# Patient Record
Sex: MALE | Race: OTHER | NOT HISPANIC OR LATINO
[De-identification: names, ages, dates, MRNs, and addresses within clinical notes are randomized per-mention and may not be internally consistent; named-entity substitution may affect disease eponyms.]

---

## 2018-01-29 PROBLEM — Z00.00 ENCOUNTER FOR PREVENTIVE HEALTH EXAMINATION: Status: ACTIVE | Noted: 2018-01-29

## 2018-02-13 ENCOUNTER — APPOINTMENT (OUTPATIENT)
Dept: UROLOGY | Facility: CLINIC | Age: 38
End: 2018-02-13
Payer: MEDICAID

## 2018-02-13 VITALS
TEMPERATURE: 98.2 F | HEIGHT: 75 IN | SYSTOLIC BLOOD PRESSURE: 116 MMHG | WEIGHT: 254 LBS | RESPIRATION RATE: 16 BRPM | BODY MASS INDEX: 31.58 KG/M2 | DIASTOLIC BLOOD PRESSURE: 86 MMHG | HEART RATE: 62 BPM

## 2018-02-13 DIAGNOSIS — Z78.9 OTHER SPECIFIED HEALTH STATUS: ICD-10-CM

## 2018-02-13 DIAGNOSIS — N50.89 OTHER SPECIFIED DISORDERS OF THE MALE GENITAL ORGANS: ICD-10-CM

## 2018-02-13 PROCEDURE — 99204 OFFICE O/P NEW MOD 45 MIN: CPT

## 2018-02-13 RX ORDER — GLYBURIDE 2.5 MG/1
TABLET ORAL
Refills: 0 | Status: ACTIVE | COMMUNITY

## 2018-02-14 LAB
APPEARANCE: CLEAR
BACTERIA: NEGATIVE
BILIRUBIN URINE: NEGATIVE
BLOOD URINE: NEGATIVE
COLOR: YELLOW
GLUCOSE QUALITATIVE U: 1000 MG/DL
HYALINE CASTS: 2 /LPF
KETONES URINE: NEGATIVE
LEUKOCYTE ESTERASE URINE: NEGATIVE
MICROSCOPIC-UA: NORMAL
NITRITE URINE: NEGATIVE
PH URINE: 6
PROTEIN URINE: NEGATIVE MG/DL
RED BLOOD CELLS URINE: 1 /HPF
SPECIFIC GRAVITY URINE: 1.04
SQUAMOUS EPITHELIAL CELLS: 2 /HPF
UROBILINOGEN URINE: NEGATIVE MG/DL
WHITE BLOOD CELLS URINE: 1 /HPF

## 2018-02-15 LAB — BACTERIA UR CULT: NORMAL

## 2018-03-05 ENCOUNTER — APPOINTMENT (OUTPATIENT)
Dept: ULTRASOUND IMAGING | Facility: HOSPITAL | Age: 38
End: 2018-03-05
Payer: MEDICAID

## 2018-03-05 ENCOUNTER — OUTPATIENT (OUTPATIENT)
Dept: OUTPATIENT SERVICES | Facility: HOSPITAL | Age: 38
LOS: 1 days | End: 2018-03-05
Payer: MEDICAID

## 2018-03-05 DIAGNOSIS — N47.1 PHIMOSIS: ICD-10-CM

## 2018-03-05 PROCEDURE — 76870 US EXAM SCROTUM: CPT | Mod: 26

## 2018-03-05 PROCEDURE — 76870 US EXAM SCROTUM: CPT

## 2018-03-14 ENCOUNTER — APPOINTMENT (OUTPATIENT)
Dept: UROLOGY | Facility: CLINIC | Age: 38
End: 2018-03-14
Payer: MEDICAID

## 2018-03-14 VITALS
DIASTOLIC BLOOD PRESSURE: 96 MMHG | TEMPERATURE: 98 F | BODY MASS INDEX: 32.58 KG/M2 | WEIGHT: 262 LBS | HEART RATE: 94 BPM | SYSTOLIC BLOOD PRESSURE: 142 MMHG | HEIGHT: 75 IN

## 2018-03-14 PROCEDURE — 99214 OFFICE O/P EST MOD 30 MIN: CPT

## 2018-03-15 LAB
AFP-TM SERPL-MCNC: 0.6 NG/ML
HCG SERPL-MCNC: <1 MIU/ML
LDH SERPL-CCNC: 148 U/L

## 2018-03-28 ENCOUNTER — APPOINTMENT (OUTPATIENT)
Dept: UROLOGY | Facility: CLINIC | Age: 38
End: 2018-03-28

## 2018-04-11 ENCOUNTER — OUTPATIENT (OUTPATIENT)
Dept: OUTPATIENT SERVICES | Facility: HOSPITAL | Age: 38
LOS: 1 days | End: 2018-04-11
Payer: MEDICAID

## 2018-04-11 ENCOUNTER — APPOINTMENT (OUTPATIENT)
Dept: MRI IMAGING | Facility: HOSPITAL | Age: 38
End: 2018-04-11
Payer: MEDICAID

## 2018-04-11 DIAGNOSIS — D49.59 NEOPLASM OF UNSPECIFIED BEHAVIOR OF OTHER GENITOURINARY ORGAN: ICD-10-CM

## 2018-04-11 PROCEDURE — 72197 MRI PELVIS W/O & W/DYE: CPT | Mod: 26

## 2018-04-11 PROCEDURE — 72197 MRI PELVIS W/O & W/DYE: CPT

## 2018-04-20 ENCOUNTER — APPOINTMENT (OUTPATIENT)
Dept: UROLOGY | Facility: CLINIC | Age: 38
End: 2018-04-20
Payer: MEDICAID

## 2018-04-20 VITALS
BODY MASS INDEX: 32.7 KG/M2 | HEART RATE: 114 BPM | HEIGHT: 75 IN | DIASTOLIC BLOOD PRESSURE: 84 MMHG | SYSTOLIC BLOOD PRESSURE: 132 MMHG | TEMPERATURE: 98.2 F | WEIGHT: 263 LBS

## 2018-04-20 DIAGNOSIS — N47.1 PHIMOSIS: ICD-10-CM

## 2018-04-20 DIAGNOSIS — R30.0 DYSURIA: ICD-10-CM

## 2018-04-20 DIAGNOSIS — Z86.39 PERSONAL HISTORY OF OTHER ENDOCRINE, NUTRITIONAL AND METABOLIC DISEASE: ICD-10-CM

## 2018-04-20 DIAGNOSIS — D49.59 NEOPLASM UNSPECIFIED BEHAVIOR OF OTHER GENITOURINARY ORGAN: ICD-10-CM

## 2018-04-20 PROCEDURE — 99214 OFFICE O/P EST MOD 30 MIN: CPT

## 2018-05-03 ENCOUNTER — OUTPATIENT (OUTPATIENT)
Dept: OUTPATIENT SERVICES | Facility: HOSPITAL | Age: 38
LOS: 1 days | End: 2018-05-03
Payer: MEDICAID

## 2018-05-03 VITALS
WEIGHT: 270.07 LBS | HEART RATE: 89 BPM | DIASTOLIC BLOOD PRESSURE: 85 MMHG | OXYGEN SATURATION: 98 % | HEIGHT: 75 IN | SYSTOLIC BLOOD PRESSURE: 135 MMHG | TEMPERATURE: 98 F | RESPIRATION RATE: 16 BRPM

## 2018-05-03 DIAGNOSIS — Z01.818 ENCOUNTER FOR OTHER PREPROCEDURAL EXAMINATION: ICD-10-CM

## 2018-05-03 DIAGNOSIS — N43.3 HYDROCELE, UNSPECIFIED: ICD-10-CM

## 2018-05-03 DIAGNOSIS — N43.2 OTHER HYDROCELE: ICD-10-CM

## 2018-05-03 PROCEDURE — G0463: CPT

## 2018-05-03 RX ORDER — SODIUM CHLORIDE 9 MG/ML
3 INJECTION INTRAMUSCULAR; INTRAVENOUS; SUBCUTANEOUS EVERY 8 HOURS
Qty: 0 | Refills: 0 | Status: DISCONTINUED | OUTPATIENT
Start: 2018-05-17 | End: 2018-05-25

## 2018-05-03 NOTE — H&P PST ADULT - GASTROINTESTINAL DETAILS
bowel sounds normal/normal/no distention/no masses palpable/no guarding/soft/nontender/no bruit/no rebound tenderness

## 2018-05-03 NOTE — H&P PST ADULT - NEGATIVE GASTROINTESTINAL SYMPTOMS
no constipation/no diarrhea/no nausea/no vomiting/no change in bowel habits/no flatulence/no abdominal pain

## 2018-05-03 NOTE — H&P PST ADULT - PMH
Diabetes mellitus, type 2    GERD (gastroesophageal reflux disease)    HLD (hyperlipidemia)    Hydrocele, bilateral    Hypertension    Obesity (BMI 30.0-34.9)    PVD (peripheral vascular disease)  bilateral lower extremities with varicose veins and brownish discoloration  Seasonal allergies Diabetes mellitus, type 2    GERD (gastroesophageal reflux disease)    HLD (hyperlipidemia)    Hydrocele, bilateral    Hypertension    Obesity (BMI 30.0-34.9)    PVD (peripheral vascular disease)  bilateral lower extremities with varicose veins and brownish discoloration  Seasonal allergies    Vitamin D deficiency

## 2018-05-03 NOTE — H&P PST ADULT - RS GEN PE MLT RESP DETAILS PC
clear to auscultation bilaterally/good air movement/no rales/respirations non-labored/no wheezes/airway patent/breath sounds equal

## 2018-05-03 NOTE — H&P PST ADULT - NEGATIVE CARDIOVASCULAR SYMPTOMS
no paroxysmal nocturnal dyspnea/no chest pain/no dyspnea on exertion/no peripheral edema/no orthopnea/no palpitations

## 2018-05-03 NOTE — H&P PST ADULT - HISTORY OF PRESENT ILLNESS
37 years old male presented with swollen painful testicles x 5 years, pt was diagnosed with hydrocele and is scheduled for bilateral hydrocelectomy on 5/17/18.

## 2018-05-03 NOTE — H&P PST ADULT - PROBLEM SELECTOR PLAN 1
Patient is scheduled for bilateral hydrocelectomy on 5/17/18.Patient is at moderate risk for this surgery.

## 2018-05-03 NOTE — H&P PST ADULT - NSANTHOSAYNRD_GEN_A_CORE
No. EKATERINA screening performed.  STOP BANG Legend: 0-2 = LOW Risk; 3-4 = INTERMEDIATE Risk; 5-8 = HIGH Risk

## 2018-05-03 NOTE — H&P PST ADULT - NEGATIVE GENERAL GENITOURINARY SYMPTOMS
no gas in urine/no hematuria/no dysuria/normal urinary frequency/no urinary hesitancy/no nocturia/no renal colic/no urine discoloration/no incontinence

## 2018-05-16 ENCOUNTER — TRANSCRIPTION ENCOUNTER (OUTPATIENT)
Age: 38
End: 2018-05-16

## 2018-05-17 ENCOUNTER — APPOINTMENT (OUTPATIENT)
Dept: UROLOGY | Facility: HOSPITAL | Age: 38
End: 2018-05-17

## 2018-05-17 ENCOUNTER — OUTPATIENT (OUTPATIENT)
Dept: OUTPATIENT SERVICES | Facility: HOSPITAL | Age: 38
LOS: 1 days | End: 2018-05-17
Payer: MEDICAID

## 2018-05-17 ENCOUNTER — RESULT REVIEW (OUTPATIENT)
Age: 38
End: 2018-05-17

## 2018-05-17 VITALS
OXYGEN SATURATION: 100 % | SYSTOLIC BLOOD PRESSURE: 151 MMHG | TEMPERATURE: 98 F | RESPIRATION RATE: 14 BRPM | DIASTOLIC BLOOD PRESSURE: 94 MMHG | WEIGHT: 270.07 LBS | HEIGHT: 75 IN | HEART RATE: 87 BPM

## 2018-05-17 VITALS
HEART RATE: 84 BPM | DIASTOLIC BLOOD PRESSURE: 83 MMHG | TEMPERATURE: 98 F | RESPIRATION RATE: 16 BRPM | SYSTOLIC BLOOD PRESSURE: 123 MMHG | OXYGEN SATURATION: 98 %

## 2018-05-17 DIAGNOSIS — N43.2 OTHER HYDROCELE: ICD-10-CM

## 2018-05-17 PROCEDURE — 88307 TISSUE EXAM BY PATHOLOGIST: CPT | Mod: 26

## 2018-05-17 PROCEDURE — 88307 TISSUE EXAM BY PATHOLOGIST: CPT

## 2018-05-17 PROCEDURE — 88302 TISSUE EXAM BY PATHOLOGIST: CPT

## 2018-05-17 PROCEDURE — 55500 REMOVAL OF HYDROCELE: CPT | Mod: RT

## 2018-05-17 PROCEDURE — 88302 TISSUE EXAM BY PATHOLOGIST: CPT | Mod: 26

## 2018-05-17 PROCEDURE — 55041 REMOVAL OF HYDROCELES: CPT

## 2018-05-17 PROCEDURE — 82962 GLUCOSE BLOOD TEST: CPT

## 2018-05-17 RX ORDER — OXYCODONE AND ACETAMINOPHEN 5; 325 MG/1; MG/1
1 TABLET ORAL EVERY 6 HOURS
Qty: 0 | Refills: 0 | Status: DISCONTINUED | OUTPATIENT
Start: 2018-05-17 | End: 2018-05-17

## 2018-05-17 RX ORDER — OMEGA-3 ACID ETHYL ESTERS 1 G
1 CAPSULE ORAL
Qty: 0 | Refills: 0 | COMMUNITY

## 2018-05-17 RX ORDER — ONDANSETRON 8 MG/1
4 TABLET, FILM COATED ORAL ONCE
Qty: 0 | Refills: 0 | Status: DISCONTINUED | OUTPATIENT
Start: 2018-05-17 | End: 2018-05-17

## 2018-05-17 RX ORDER — HYDROMORPHONE HYDROCHLORIDE 2 MG/ML
0.5 INJECTION INTRAMUSCULAR; INTRAVENOUS; SUBCUTANEOUS
Qty: 0 | Refills: 0 | Status: DISCONTINUED | OUTPATIENT
Start: 2018-05-17 | End: 2018-05-17

## 2018-05-17 RX ORDER — CEPHALEXIN 500 MG
1 CAPSULE ORAL
Qty: 6 | Refills: 0
Start: 2018-05-17 | End: 2018-05-19

## 2018-05-17 RX ORDER — SODIUM CHLORIDE 9 MG/ML
1000 INJECTION, SOLUTION INTRAVENOUS
Qty: 0 | Refills: 0 | Status: DISCONTINUED | OUTPATIENT
Start: 2018-05-17 | End: 2018-05-25

## 2018-05-17 RX ADMIN — OXYCODONE AND ACETAMINOPHEN 1 TABLET(S): 5; 325 TABLET ORAL at 12:07

## 2018-05-17 RX ADMIN — SODIUM CHLORIDE 100 MILLILITER(S): 9 INJECTION, SOLUTION INTRAVENOUS at 12:08

## 2018-05-17 RX ADMIN — OXYCODONE AND ACETAMINOPHEN 1 TABLET(S): 5; 325 TABLET ORAL at 11:56

## 2018-05-17 NOTE — ASU DISCHARGE PLAN (ADULT/PEDIATRIC). - MEDICATION SUMMARY - MEDICATIONS TO TAKE
I will START or STAY ON the medications listed below when I get home from the hospital:    oxyCODONE-acetaminophen 5 mg-325 mg oral tablet  -- 1 tab(s) by mouth every 6 hours, As needed, Moderate Pain (4 - 6) MDD:4  -- Indication: For pain     aspirin 81 mg oral tablet  -- 1 tab(s) by mouth once a day  -- Indication: For as directed     enalapril 10 mg oral tablet  -- 1 tab(s) by mouth once a day  -- Indication: For as directed     Janumet 50 mg-1000 mg oral tablet  -- 1 tab(s) by mouth 2 times a day  -- Indication: For as directed     glipiZIDE 10 mg oral tablet, extended release  -- 1 tab(s) by mouth once a day  -- Indication: For as directed     loratadine 10 mg oral tablet  -- 1 tab(s) by mouth once a day  -- Indication: For as directed     atorvastatin 20 mg oral tablet  -- 1 tab(s) by mouth once a day  -- Indication: For as directed     Keflex 500 mg oral capsule  -- 1 cap(s) by mouth every 12 hours   -- Finish all this medication unless otherwise directed by prescriber.    -- Indication: For vas directed     NexIUM 40 mg oral delayed release capsule  -- 1 cap(s) by mouth once a day  -- Indication: For as directed     Vitamin D2 50,000 intl units (1.25 mg) oral capsule  -- 1 cap(s) by mouth once a week  -- Indication: For as directed

## 2018-05-17 NOTE — ASU PATIENT PROFILE, ADULT - PMH
Diabetes mellitus, type 2    GERD (gastroesophageal reflux disease)    HLD (hyperlipidemia)    Hydrocele, bilateral    Hypertension    Obesity (BMI 30.0-34.9)    PVD (peripheral vascular disease)  bilateral lower extremities with varicose veins and brownish discoloration  Seasonal allergies    Vitamin D deficiency

## 2018-05-17 NOTE — BRIEF OPERATIVE NOTE - PROCEDURE
<<-----Click on this checkbox to enter Procedure Hydrocelectomy, bilateral  05/17/2018    Active  [facility]

## 2018-05-18 ENCOUNTER — APPOINTMENT (OUTPATIENT)
Dept: UROLOGY | Facility: CLINIC | Age: 38
End: 2018-05-18
Payer: MEDICAID

## 2018-05-18 VITALS
HEIGHT: 75 IN | OXYGEN SATURATION: 97 % | WEIGHT: 260 LBS | DIASTOLIC BLOOD PRESSURE: 80 MMHG | BODY MASS INDEX: 32.33 KG/M2 | RESPIRATION RATE: 16 BRPM | TEMPERATURE: 97.8 F | SYSTOLIC BLOOD PRESSURE: 130 MMHG

## 2018-05-18 DIAGNOSIS — N43.3 HYDROCELE, UNSPECIFIED: ICD-10-CM

## 2018-05-18 PROCEDURE — 99024 POSTOP FOLLOW-UP VISIT: CPT

## 2018-05-19 ENCOUNTER — OTHER (OUTPATIENT)
Age: 38
End: 2018-05-19

## 2018-05-23 ENCOUNTER — MESSAGE (OUTPATIENT)
Age: 38
End: 2018-05-23

## 2018-05-25 ENCOUNTER — APPOINTMENT (OUTPATIENT)
Dept: UROLOGY | Facility: CLINIC | Age: 38
End: 2018-05-25

## 2018-08-17 ENCOUNTER — APPOINTMENT (OUTPATIENT)
Dept: UROLOGY | Facility: CLINIC | Age: 38
End: 2018-08-17

## 2019-04-15 ENCOUNTER — RX RENEWAL (OUTPATIENT)
Age: 39
End: 2019-04-15

## 2019-06-09 PROBLEM — N47.1 PHIMOSIS: Status: ACTIVE | Noted: 2018-02-13

## 2020-07-21 NOTE — BRIEF OPERATIVE NOTE - PRE-OP DX
Please call dad at [phone] and let him know that [facility] and [facility] fever test were both negative  Brother's tests for the same were also negative  See other note  Thank you  Hydrocele, bilateral  05/17/2018    Active  Mer[facility] De Leon

## 2022-09-21 ENCOUNTER — INPATIENT (INPATIENT)
Facility: HOSPITAL | Age: 42
LOS: 12 days | Discharge: HOME HEALTH | DRG: 629 | End: 2022-10-04
Attending: HOSPITALIST | Admitting: HOSPITALIST
Payer: MEDICAID

## 2022-09-21 VITALS
HEIGHT: 75 IN | OXYGEN SATURATION: 100 % | HEART RATE: 110 BPM | RESPIRATION RATE: 18 BRPM | SYSTOLIC BLOOD PRESSURE: 155 MMHG | TEMPERATURE: 98 F | WEIGHT: 253.97 LBS | DIASTOLIC BLOOD PRESSURE: 94 MMHG

## 2022-09-21 DIAGNOSIS — E11.621 TYPE 2 DIABETES MELLITUS WITH FOOT ULCER: ICD-10-CM

## 2022-09-21 DIAGNOSIS — I10 ESSENTIAL (PRIMARY) HYPERTENSION: ICD-10-CM

## 2022-09-21 DIAGNOSIS — S91.302A UNSPECIFIED OPEN WOUND, LEFT FOOT, INITIAL ENCOUNTER: ICD-10-CM

## 2022-09-21 DIAGNOSIS — Z29.9 ENCOUNTER FOR PROPHYLACTIC MEASURES, UNSPECIFIED: ICD-10-CM

## 2022-09-21 DIAGNOSIS — E78.5 HYPERLIPIDEMIA, UNSPECIFIED: ICD-10-CM

## 2022-09-21 DIAGNOSIS — E11.9 TYPE 2 DIABETES MELLITUS WITHOUT COMPLICATIONS: ICD-10-CM

## 2022-09-21 PROBLEM — K21.9 GASTRO-ESOPHAGEAL REFLUX DISEASE WITHOUT ESOPHAGITIS: Chronic | Status: ACTIVE | Noted: 2018-05-03

## 2022-09-21 PROBLEM — E66.9 OBESITY, UNSPECIFIED: Chronic | Status: ACTIVE | Noted: 2018-05-03

## 2022-09-21 LAB
ALBUMIN SERPL ELPH-MCNC: 2.2 G/DL — LOW (ref 3.5–5)
ALP SERPL-CCNC: 72 U/L — SIGNIFICANT CHANGE UP (ref 40–120)
ALT FLD-CCNC: 13 U/L DA — SIGNIFICANT CHANGE UP (ref 10–60)
ANION GAP SERPL CALC-SCNC: 9 MMOL/L — SIGNIFICANT CHANGE UP (ref 5–17)
AST SERPL-CCNC: 9 U/L — LOW (ref 10–40)
BASOPHILS # BLD AUTO: 0.05 K/UL — SIGNIFICANT CHANGE UP (ref 0–0.2)
BASOPHILS NFR BLD AUTO: 0.4 % — SIGNIFICANT CHANGE UP (ref 0–2)
BILIRUB SERPL-MCNC: 0.7 MG/DL — SIGNIFICANT CHANGE UP (ref 0.2–1.2)
BUN SERPL-MCNC: 7 MG/DL — SIGNIFICANT CHANGE UP (ref 7–18)
CALCIUM SERPL-MCNC: 8.8 MG/DL — SIGNIFICANT CHANGE UP (ref 8.4–10.5)
CHLORIDE SERPL-SCNC: 98 MMOL/L — SIGNIFICANT CHANGE UP (ref 96–108)
CO2 SERPL-SCNC: 24 MMOL/L — SIGNIFICANT CHANGE UP (ref 22–31)
CREAT SERPL-MCNC: 0.72 MG/DL — SIGNIFICANT CHANGE UP (ref 0.5–1.3)
EGFR: 117 ML/MIN/1.73M2 — SIGNIFICANT CHANGE UP
EOSINOPHIL # BLD AUTO: 0.14 K/UL — SIGNIFICANT CHANGE UP (ref 0–0.5)
EOSINOPHIL NFR BLD AUTO: 1.2 % — SIGNIFICANT CHANGE UP (ref 0–6)
ERYTHROCYTE [SEDIMENTATION RATE] IN BLOOD: 96 MM/HR — HIGH (ref 0–15)
GLUCOSE SERPL-MCNC: 246 MG/DL — HIGH (ref 70–99)
HCT VFR BLD CALC: 34.1 % — LOW (ref 39–50)
HGB BLD-MCNC: 10.7 G/DL — LOW (ref 13–17)
IMM GRANULOCYTES NFR BLD AUTO: 0.3 % — SIGNIFICANT CHANGE UP (ref 0–0.9)
LYMPHOCYTES # BLD AUTO: 1.75 K/UL — SIGNIFICANT CHANGE UP (ref 1–3.3)
LYMPHOCYTES # BLD AUTO: 15.3 % — SIGNIFICANT CHANGE UP (ref 13–44)
MCHC RBC-ENTMCNC: 25.8 PG — LOW (ref 27–34)
MCHC RBC-ENTMCNC: 31.4 GM/DL — LOW (ref 32–36)
MCV RBC AUTO: 82.4 FL — SIGNIFICANT CHANGE UP (ref 80–100)
MONOCYTES # BLD AUTO: 1.2 K/UL — HIGH (ref 0–0.9)
MONOCYTES NFR BLD AUTO: 10.5 % — SIGNIFICANT CHANGE UP (ref 2–14)
NEUTROPHILS # BLD AUTO: 8.28 K/UL — HIGH (ref 1.8–7.4)
NEUTROPHILS NFR BLD AUTO: 72.3 % — SIGNIFICANT CHANGE UP (ref 43–77)
NRBC # BLD: 0 /100 WBCS — SIGNIFICANT CHANGE UP (ref 0–0)
PLATELET # BLD AUTO: 266 K/UL — SIGNIFICANT CHANGE UP (ref 150–400)
POTASSIUM SERPL-MCNC: 3.9 MMOL/L — SIGNIFICANT CHANGE UP (ref 3.5–5.3)
POTASSIUM SERPL-SCNC: 3.9 MMOL/L — SIGNIFICANT CHANGE UP (ref 3.5–5.3)
PROT SERPL-MCNC: 8.3 G/DL — SIGNIFICANT CHANGE UP (ref 6–8.3)
RBC # BLD: 4.14 M/UL — LOW (ref 4.2–5.8)
RBC # FLD: 13.8 % — SIGNIFICANT CHANGE UP (ref 10.3–14.5)
SARS-COV-2 RNA SPEC QL NAA+PROBE: SIGNIFICANT CHANGE UP
SODIUM SERPL-SCNC: 131 MMOL/L — LOW (ref 135–145)
WBC # BLD: 11.45 K/UL — HIGH (ref 3.8–10.5)
WBC # FLD AUTO: 11.45 K/UL — HIGH (ref 3.8–10.5)

## 2022-09-21 PROCEDURE — 99223 1ST HOSP IP/OBS HIGH 75: CPT | Mod: GC

## 2022-09-21 PROCEDURE — 99285 EMERGENCY DEPT VISIT HI MDM: CPT

## 2022-09-21 PROCEDURE — 93971 EXTREMITY STUDY: CPT | Mod: 26,LT

## 2022-09-21 PROCEDURE — 93923 UPR/LXTR ART STDY 3+ LVLS: CPT | Mod: 26

## 2022-09-21 PROCEDURE — 73630 X-RAY EXAM OF FOOT: CPT | Mod: 26,LT

## 2022-09-21 RX ORDER — ESOMEPRAZOLE MAGNESIUM 40 MG/1
1 CAPSULE, DELAYED RELEASE ORAL
Qty: 0 | Refills: 0 | DISCHARGE

## 2022-09-21 RX ORDER — VANCOMYCIN HCL 1 G
VIAL (EA) INTRAVENOUS
Refills: 0 | Status: COMPLETED | OUTPATIENT
Start: 2022-09-21 | End: 2022-09-22

## 2022-09-21 RX ORDER — CEFEPIME 1 G/1
INJECTION, POWDER, FOR SOLUTION INTRAMUSCULAR; INTRAVENOUS
Refills: 0 | Status: DISCONTINUED | OUTPATIENT
Start: 2022-09-21 | End: 2022-09-23

## 2022-09-21 RX ORDER — ONDANSETRON 8 MG/1
4 TABLET, FILM COATED ORAL ONCE
Refills: 0 | Status: DISCONTINUED | OUTPATIENT
Start: 2022-09-21 | End: 2022-09-21

## 2022-09-21 RX ORDER — VANCOMYCIN HCL 1 G
1750 VIAL (EA) INTRAVENOUS ONCE
Refills: 0 | Status: COMPLETED | OUTPATIENT
Start: 2022-09-21 | End: 2022-09-21

## 2022-09-21 RX ORDER — VANCOMYCIN HCL 1 G
1750 VIAL (EA) INTRAVENOUS EVERY 12 HOURS
Refills: 0 | Status: DISCONTINUED | OUTPATIENT
Start: 2022-09-22 | End: 2022-09-23

## 2022-09-21 RX ORDER — INFLUENZA VIRUS VACCINE 15; 15; 15; 15 UG/.5ML; UG/.5ML; UG/.5ML; UG/.5ML
0.5 SUSPENSION INTRAMUSCULAR ONCE
Refills: 0 | Status: DISCONTINUED | OUTPATIENT
Start: 2022-09-21 | End: 2022-10-04

## 2022-09-21 RX ORDER — ATORVASTATIN CALCIUM 80 MG/1
20 TABLET, FILM COATED ORAL AT BEDTIME
Refills: 0 | Status: DISCONTINUED | OUTPATIENT
Start: 2022-09-21 | End: 2022-09-22

## 2022-09-21 RX ORDER — CEFEPIME 1 G/1
1000 INJECTION, POWDER, FOR SOLUTION INTRAMUSCULAR; INTRAVENOUS ONCE
Refills: 0 | Status: COMPLETED | OUTPATIENT
Start: 2022-09-21 | End: 2022-09-21

## 2022-09-21 RX ORDER — HEPARIN SODIUM 5000 [USP'U]/ML
5000 INJECTION INTRAVENOUS; SUBCUTANEOUS EVERY 8 HOURS
Refills: 0 | Status: DISCONTINUED | OUTPATIENT
Start: 2022-09-21 | End: 2022-09-26

## 2022-09-21 RX ORDER — MORPHINE SULFATE 50 MG/1
4 CAPSULE, EXTENDED RELEASE ORAL ONCE
Refills: 0 | Status: DISCONTINUED | OUTPATIENT
Start: 2022-09-21 | End: 2022-09-21

## 2022-09-21 RX ORDER — PIPERACILLIN AND TAZOBACTAM 4; .5 G/20ML; G/20ML
3.38 INJECTION, POWDER, LYOPHILIZED, FOR SOLUTION INTRAVENOUS ONCE
Refills: 0 | Status: COMPLETED | OUTPATIENT
Start: 2022-09-21 | End: 2022-09-21

## 2022-09-21 RX ORDER — TETANUS AND DIPHTHERIA TOXOIDS ADSORBED 2; 2 [LF]/.5ML; [LF]/.5ML
0.5 INJECTION INTRAMUSCULAR ONCE
Refills: 0 | Status: COMPLETED | OUTPATIENT
Start: 2022-09-21 | End: 2022-09-21

## 2022-09-21 RX ORDER — GLUCAGON INJECTION, SOLUTION 0.5 MG/.1ML
1 INJECTION, SOLUTION SUBCUTANEOUS ONCE
Refills: 0 | Status: DISCONTINUED | OUTPATIENT
Start: 2022-09-21 | End: 2022-09-26

## 2022-09-21 RX ORDER — CEFEPIME 1 G/1
1000 INJECTION, POWDER, FOR SOLUTION INTRAMUSCULAR; INTRAVENOUS EVERY 12 HOURS
Refills: 0 | Status: DISCONTINUED | OUTPATIENT
Start: 2022-09-22 | End: 2022-09-23

## 2022-09-21 RX ORDER — INSULIN LISPRO 100/ML
VIAL (ML) SUBCUTANEOUS
Refills: 0 | Status: DISCONTINUED | OUTPATIENT
Start: 2022-09-21 | End: 2022-09-23

## 2022-09-21 RX ORDER — ACETAMINOPHEN 500 MG
650 TABLET ORAL EVERY 6 HOURS
Refills: 0 | Status: DISCONTINUED | OUTPATIENT
Start: 2022-09-21 | End: 2022-09-26

## 2022-09-21 RX ORDER — ASPIRIN/CALCIUM CARB/MAGNESIUM 324 MG
81 TABLET ORAL DAILY
Refills: 0 | Status: DISCONTINUED | OUTPATIENT
Start: 2022-09-21 | End: 2022-09-26

## 2022-09-21 RX ORDER — VANCOMYCIN HCL 1 G
1750 VIAL (EA) INTRAVENOUS EVERY 12 HOURS
Refills: 0 | Status: COMPLETED | OUTPATIENT
Start: 2022-09-22 | End: 2022-09-22

## 2022-09-21 RX ORDER — ERGOCALCIFEROL 1.25 MG/1
1 CAPSULE ORAL
Qty: 0 | Refills: 0 | DISCHARGE

## 2022-09-21 RX ORDER — SODIUM CHLORIDE 9 MG/ML
1000 INJECTION, SOLUTION INTRAVENOUS
Refills: 0 | Status: DISCONTINUED | OUTPATIENT
Start: 2022-09-21 | End: 2022-09-21

## 2022-09-21 RX ORDER — LORATADINE 10 MG/1
1 TABLET ORAL
Qty: 0 | Refills: 0 | DISCHARGE

## 2022-09-21 RX ADMIN — Medication 250 MILLIGRAM(S): at 16:00

## 2022-09-21 RX ADMIN — ATORVASTATIN CALCIUM 20 MILLIGRAM(S): 80 TABLET, FILM COATED ORAL at 19:57

## 2022-09-21 RX ADMIN — MORPHINE SULFATE 4 MILLIGRAM(S): 50 CAPSULE, EXTENDED RELEASE ORAL at 15:20

## 2022-09-21 RX ADMIN — MORPHINE SULFATE 4 MILLIGRAM(S): 50 CAPSULE, EXTENDED RELEASE ORAL at 14:41

## 2022-09-21 RX ADMIN — HEPARIN SODIUM 5000 UNIT(S): 5000 INJECTION INTRAVENOUS; SUBCUTANEOUS at 19:58

## 2022-09-21 RX ADMIN — Medication 650 MILLIGRAM(S): at 19:58

## 2022-09-21 RX ADMIN — Medication 81 MILLIGRAM(S): at 19:59

## 2022-09-21 RX ADMIN — PIPERACILLIN AND TAZOBACTAM 200 GRAM(S): 4; .5 INJECTION, POWDER, LYOPHILIZED, FOR SOLUTION INTRAVENOUS at 14:40

## 2022-09-21 RX ADMIN — TETANUS AND DIPHTHERIA TOXOIDS ADSORBED 0.5 MILLILITER(S): 2; 2 INJECTION INTRAMUSCULAR at 20:04

## 2022-09-21 RX ADMIN — CEFEPIME 100 MILLIGRAM(S): 1 INJECTION, POWDER, FOR SOLUTION INTRAMUSCULAR; INTRAVENOUS at 19:58

## 2022-09-21 NOTE — ED PROVIDER NOTE - OBJECTIVE STATEMENT
41 year old male with PMHx of DM presents for worsening left foot ulcer, left foot swelling and pain. He had seen ID doctor for further evaluation of osteomyelitis and ulcer. Was on IV abx 2-3 weeks ago but finished course however pain still there and was told there was worsening of infection.

## 2022-09-21 NOTE — ED ADULT NURSE NOTE - OBJECTIVE STATEMENT
Pt c/o worsening diabetic foot ulcer. Pt states sent from infection MD for evaluation of foot. Upon assessment, left foot swollen, diabetic ulcer noted. No acute distress noted, denies chest pain, no shortness of breath indicated.

## 2022-09-21 NOTE — ED PROVIDER NOTE - CLINICAL SUMMARY MEDICAL DECISION MAKING FREE TEXT BOX
Patient presents for left foot pain, swelling, redness and evaluation for infection. On exam, left leg is swollen, erythematous with tender stage 2 ulcer under left second toe. Concern for diabetic foot ulcer, osteomyelitis, necrotizing fasciitis, abscess. Plan: labs, XR, US, abx and admission

## 2022-09-21 NOTE — H&P ADULT - ATTENDING COMMENTS
Patient seen and examined. Case discussed with Dr. Chambers. In brief, this is a 42 yo M with DM2 with hyperglycemia (reported A1c >10), HTN, HLD who presents for worsening of his left foot wound. Per the patient, he stepped on a nail a number of weeks ago, subsequently developed an infection around it and went to [facility] where the wound was debrided by podiatry and patient was discharged with 2 weeks of IV Unasyn via PICC line. Patient finished his antibiotics last Thursday, but he then noticed the wound to be having purulent discharge, worsening pain, and worsening erythema. He also endorsed chills, but did not have a thermometer to take his temperature. He subsequently followed up with an infectious disease physician today (he forgets the name) who told him to go to the ED for further evaluation. He subsequently returned to [facility] but waited 10 hours without being seen so he came to [facility]. Appreciate podiatry recommendations, will check MRI of the left foot to assess for osteomyelitis. Will start vancomycin and cefepime as patient reports chills and subjective fevers at home. ID Consult (Dr. Stratton). Team to obtain home insulin dosing and check A1c. Remaining care as noted above. Patient seen and examined. Case discussed with Dr. Chambers. In brief, this is a 40 yo M with DM2 with hyperglycemia (reported A1c >10), HTN, HLD who presents for worsening of his left foot wound. Per the patient, he stepped on a nail a number of weeks ago, subsequently developed an infection around it and went to [facility] where the wound was debrided by podiatry and patient was discharged with 2 weeks of IV Unasyn via PICC line. Patient finished his antibiotics last Thursday, but he then noticed the wound to be having purulent discharge, worsening pain, and worsening erythema. He also endorsed chills, but did not have a thermometer to take his temperature. He subsequently followed up with an infectious disease physician today (he forgets the name) who told him to go to the ED for further evaluation. He subsequently returned to [facility] but waited 10 hours without being seen so he came to [facility]. Appreciate podiatry recommendations, will check MRI of the left foot to assess for osteomyelitis. Will start vancomycin and cefepime as patient reports chills and subjective fevers at home. ID Consult (Dr. Stratton). Team to obtain home insulin dosing and check A1c. Remaining care as noted above. Wife will bring information of the infectious disease doctor the patient saw in the outpatient setting tomorrow. Patient seen and examined. Case discussed with Dr. Chambers. In brief, this is a 40 yo M with DM2 with hyperglycemia (reported A1c >10), HTN, HLD who presents for worsening of his left foot wound. Per the patient, he stepped on a nail a number of weeks ago, subsequently developed an infection around it and went to [facility] where the wound was debrided by podiatry and patient was discharged with 2 weeks of IV Unasyn via PICC line. Patient finished his antibiotics last Thursday, but he then noticed the wound to be having purulent discharge, worsening pain, and worsening erythema. He also endorsed chills, but did not have a thermometer to take his temperature. He subsequently followed up with an infectious disease physician today (he forgets the name) who told him to go to the ED for further evaluation. He subsequently returned to [facility] but waited 10 hours without being seen so he came to [facility]. Appreciate podiatry recommendations, will check MRI of the left foot to assess for osteomyelitis. Will start vancomycin and cefepime as patient reports chills and subjective fevers at home. ID Consult (Dr. Strattno). Team to obtain home insulin dosing and check A1c. Remaining care as stated above. Wife will bring information of the infectious disease doctor the patient saw in the outpatient setting tomorrow.

## 2022-09-21 NOTE — ED PROVIDER NOTE - PHYSICAL EXAMINATION
Const: Uncomfortable  HEENT: Head is normocephalic, atraumatic. PERRLA. EOMI. Sclera and conjunctiva normal  Lungs:clear to auscultation bilaterally. No wheezes, crackles, rhonchi   Cardiovascular: RRR, no murmurs, rubs or gallops.  Abdomen: No scars. No distension. Soft and nontender. No rebound, guarding or masses noted.  Back: No midlines or paraspinous tenderness. No CVA tenderness   MSK: Left leg swollen, erythematous. No pitting. Left foot is warm, +2 dp pulses with foot ulcer at plantar aspect of the left foot by great toe and second toe.   Neuro:Awake, AOx3, follows commands

## 2022-09-21 NOTE — ED PROVIDER NOTE - NSICDXPASTMEDICALHX_GEN_ALL_CORE_FT
PAST MEDICAL HISTORY:  Diabetes mellitus, type 2     GERD (gastroesophageal reflux disease)     HLD (hyperlipidemia)     Hypertension     Obesity (BMI 30.0-34.9)

## 2022-09-21 NOTE — ED ADULT NURSE NOTE - ED STAT RN HANDOFF DETAILS
Report given to ASHLEY HAYDEN Pt resting in bed, no acute distress noted, denies chest pain, no shortness of breath indicated.

## 2022-09-21 NOTE — H&P ADULT - ASSESSMENT
Patient is a 40 yo male with hx of DM (last A1c >10), HTN, HLD presents with a left foot wound. Upon evaluation in the ED, patient afebrile and hemodynamically stable. Further workup, showed leukocytosis of 11k, with negative DVT study. Patient admitted for left foot wound r/o osteomyelitis.

## 2022-09-21 NOTE — CONSULT NOTE ADULT - SUBJECTIVE AND OBJECTIVE BOX
Podiatry HPI: Pt is a 41M who presents to ED with his girlfriend with a chief complaint of worsening left foot wound. Pt states about 1.5-2 months prior, he was walking in work boots when he stepped on a metal screw. Pt admits he didn't feel it initially due to his neuropathy from diabetes and it wasn't until he noticed blood that he saw a wound. Following this, pt arrived at [facility] where he was admitted for 7 days where they did a bedside I&D and given Unasyn and later discharged on IV picc line with more unasyn. Pt states he previously had a home health nurse changing his foot dressings every other day. Admits he noticed a blistering that worsened to bottom of left foot and that he had a lot of drainage to left 1st interspace with pinpoint wound. Pt states today, he experienced chills and burning pain that worsened when walking. Denies further constitutional symptoms. Denies recent acute trauma. Denies further pedal complaints.     Patient is a 41y old  Male who presents with a chief complaint of Left foot wound r/o Osteomyelitis (21 Sep 2022 18:46)      HPI:  Patient is a 42 yo male with hx of DM (last A1c >10), HTN, HLD presents with a left foot wound. The left foot wound initially started 6 weeks ago when the patient had a screw puncture his sole of his slipper and then suffered a wound on the bottom of the left foot. Patient stated he then when to [facility] and was admitted for 7 days in which he received Unasyn and then was later discharged with PICC line for additional 2-3 weeks of IV unasyn. Patient had wound care nurse come to his home, however started to develop blistering of the left foot and enlargement of the wound on the sole of his foot. Wounds also developed between the left great toe and 2nd toe. Patient reports purulent discharge and pain. Pain is in the left foot, 10/10 burning, constant and worsened with ambulation. Patient reports fevers and chills. Patient also states he has numbness in the 2nd toe on the left side and tingling. Patient denies chest pain, sob or palpitations. Denies syncope, dizziness.     Pharmacy was called multiple times, no answer. Please confirm Insulin dosages.  (21 Sep 2022 18:46)      Podiatry HPI    PMH:Seasonal allergies    Obesity (BMI 30.0-34.9)    Hydrocele, bilateral    HLD (hyperlipidemia)    Hypertension    Diabetes mellitus, type 2    PVD (peripheral vascular disease)    GERD (gastroesophageal reflux disease)    Vitamin D deficiency      Allergies: No Known Allergies    Medications: acetaminophen     Tablet .. 650 milliGRAM(s) Oral every 6 hours PRN  aspirin  chewable 81 milliGRAM(s) Oral daily  atorvastatin 20 milliGRAM(s) Oral at bedtime  cefepime   IVPB      enalapril 10 milliGRAM(s) Oral daily  glucagon  Injectable 1 milliGRAM(s) IntraMuscular once  heparin   Injectable 5000 Unit(s) SubCutaneous every 8 hours  insulin lispro (ADMELOG) corrective regimen sliding scale   SubCutaneous three times a day before meals  vancomycin  IVPB        FH:Diabetes mellitus, type 2 (Mother)      PSX: No significant past surgical history      SH: acetaminophen     Tablet .. 650 milliGRAM(s) Oral every 6 hours PRN  aspirin  chewable 81 milliGRAM(s) Oral daily  atorvastatin 20 milliGRAM(s) Oral at bedtime  cefepime   IVPB      enalapril 10 milliGRAM(s) Oral daily  glucagon  Injectable 1 milliGRAM(s) IntraMuscular once  heparin   Injectable 5000 Unit(s) SubCutaneous every 8 hours  insulin lispro (ADMELOG) corrective regimen sliding scale   SubCutaneous three times a day before meals  vancomycin  IVPB          Vital Signs Last 24 Hrs  T(C): 38.3 (21 Sep 2022 20:17), Max: 38.3 (21 Sep 2022 20:17)  T(F): 100.9 (21 Sep 2022 20:17), Max: 100.9 (21 Sep 2022 20:17)  HR: 112 (21 Sep 2022 20:17) (97 - 112)  BP: 126/71 (21 Sep 2022 16:17) (126/71 - 155/94)  BP(mean): --  RR: 20 (21 Sep 2022 20:17) (18 - 20)  SpO2: 98% (21 Sep 2022 20:17) (95% - 100%)    Parameters below as of 21 Sep 2022 10:42  Patient On (Oxygen Delivery Method): room air        LABS                        10.7   11.45 )-----------( 266      ( 21 Sep 2022 14:20 )             34.1               09-21    131<L>  |  98  |  7   ----------------------------<  246<H>  3.9   |  24  |  0.72    Ca    8.8      21 Sep 2022 14:20    TPro  8.3  /  Alb  2.2<L>  /  TBili  0.7  /  DBili  x   /  AST  9<L>  /  ALT  13  /  AlkPhos  72  09-21      ROS  REVIEW OF SYSTEMS:    CONSTITUTIONAL: No fever, weight loss, or fatigue  EYES: No eye pain, visual disturbances, or discharge  ENMT:  No difficulty hearing, tinnitus, vertigo; No sinus or throat pain  NECK: No pain or stiffness  BREASTS: No pain, masses, or nipple discharge  RESPIRATORY: No cough, wheezing, chills or hemoptysis; No shortness of breath  CARDIOVASCULAR: No chest pain, palpitations, dizziness, or leg swelling  GASTROINTESTINAL: No abdominal or epigastric pain. No nausea, vomiting, or hematemesis; No diarrhea or constipation. No melena or hematochezia.  GENITOURINARY: No dysuria, frequency, hematuria, or incontinence  NEUROLOGICAL: No headaches, memory loss, loss of strength, numbness, or tremors  SKIN: No itching, burning, rashes, or lesions   LYMPH NODES: No enlarged glands  ENDOCRINE: No heat or cold intolerance; No hair loss  MUSCULOSKELETAL: No joint pain or swelling; No muscle, back, or extremity pain  PSYCHIATRIC: No depression, anxiety, mood swings, or difficulty sleeping  HEME/LYMPH: No easy bruising, or bleeding gums  ALLERY AND IMMUNOLOGIC: No hives or eczema      PHYSICAL EXAM  LE Focused:  Pt is A&Ox3 in no acute distress  Vasc: DP/PT pulses non palpable due to edema b/l ; CFT < 3 seconds to hallux on left; edema and erythema noted to dorsal foot on left with increased TG from proximal to distal on left   Derm: Left 1st interspace maceration noted with small wound that tunneled to plantar wound measuring .2 x .2cm. < 5 cc of purulent drainage was expressed from 1st interspace wound. Left plantar foot wound sub-2nd and 3rd metatarsal heads extending plantar with hyperkeratotic edges and fibrous wound base that had scant purulent drainage < 1 cc and + PTB.    Neuro: Protective sensation absent b/l; light touch sensation diminished b/l   MSK: No POP to left foot wounds       IMAGING: ?xray    CULTURES:     A:  - DM uncontrolled  - Left plantar foot wound; Left 1st interspace wound         P:   Patient evaluated and Chart reviewed   Discussed diagnosis and treatment with patient  Wound flushed with betadine/saline flush   Obtained deep wound culture from left 1st interspace deep wound to be sent to Lab   Excisional debridement with 10 blade of left plantar foot wound down to and including subcutaneous tissue  Applied iodoform packing to left plantar foot and 1st interspace with DSD, ACE  X-rays evaluated; pending final read; no soft tissue emphysema noted   Continue with IV antibiotics As Per ID recc  Recommend MRI of left foot to r/o OM  Ordered ADRYAN/PVR  NWB to left foot  Discussed importance of daily foot examinations and proper shoe gear and to importance of lower Fasting Blood Glucose levels.   Podiatry to follow while in house.  Discussed with Attending Dr. Serrano    Podiatry HPI: Pt is a 41M who presents to ED with his girlfriend with a chief complaint of worsening left foot wound. Pt states about 1.5-2 months prior, he was walking in work boots when he stepped on a metal screw. Pt admits he didn't feel it initially due to his neuropathy from diabetes and it wasn't until he noticed blood that he saw a wound. Following this, pt arrived at [facility] where he was admitted for 7 days where they did a bedside I&D and given Unasyn and later discharged on IV picc line with more unasyn for 2 weeks. Pt states he previously had a home health nurse changing his foot dressings every other day. Admits he noticed a blistering that worsened to bottom of left foot and that he had a lot of drainage to left 1st interspace with pinpoint wound. A week after the IV abx finished he saw his Infectious disease doctor who advised him to go to the ED.. Pt states today, he experienced chills and burning pain that worsened when walking. Denies further constitutional symptoms. Denies recent acute trauma. Denies further pedal complaints.     Patient is a 41y old  Male who presents with a chief complaint of Left foot wound r/o Osteomyelitis (21 Sep 2022 18:46)      HPI:  Patient is a 40 yo male with hx of DM (last A1c >10), HTN, HLD presents with a left foot wound. The left foot wound initially started 6 weeks ago when the patient had a screw puncture his sole of his slipper and then suffered a wound on the bottom of the left foot. Patient stated he then when to [facility] and was admitted for 7 days in which he received Unasyn and then was later discharged with PICC line for additional 2-3 weeks of IV unasyn. Patient had wound care nurse come to his home, however started to develop blistering of the left foot and enlargement of the wound on the sole of his foot. Wounds also developed between the left great toe and 2nd toe. Patient reports purulent discharge and pain. Pain is in the left foot, 10/10 burning, constant and worsened with ambulation. Patient reports fevers and chills. Patient also states he has numbness in the 2nd toe on the left side and tingling. Patient denies chest pain, sob or palpitations. Denies syncope, dizziness.     Pharmacy was called multiple times, no answer. Please confirm Insulin dosages.  (21 Sep 2022 18:46)      Podiatry HPI    PMH:Seasonal allergies    Obesity (BMI 30.0-34.9)    Hydrocele, bilateral    HLD (hyperlipidemia)    Hypertension    Diabetes mellitus, type 2    PVD (peripheral vascular disease)    GERD (gastroesophageal reflux disease)    Vitamin D deficiency      Allergies: No Known Allergies    Medications: acetaminophen     Tablet .. 650 milliGRAM(s) Oral every 6 hours PRN  aspirin  chewable 81 milliGRAM(s) Oral daily  atorvastatin 20 milliGRAM(s) Oral at bedtime  cefepime   IVPB      enalapril 10 milliGRAM(s) Oral daily  glucagon  Injectable 1 milliGRAM(s) IntraMuscular once  heparin   Injectable 5000 Unit(s) SubCutaneous every 8 hours  insulin lispro (ADMELOG) corrective regimen sliding scale   SubCutaneous three times a day before meals  vancomycin  IVPB        FH:Diabetes mellitus, type 2 (Mother)      PSX: No significant past surgical history      SH: acetaminophen     Tablet .. 650 milliGRAM(s) Oral every 6 hours PRN  aspirin  chewable 81 milliGRAM(s) Oral daily  atorvastatin 20 milliGRAM(s) Oral at bedtime  cefepime   IVPB      enalapril 10 milliGRAM(s) Oral daily  glucagon  Injectable 1 milliGRAM(s) IntraMuscular once  heparin   Injectable 5000 Unit(s) SubCutaneous every 8 hours  insulin lispro (ADMELOG) corrective regimen sliding scale   SubCutaneous three times a day before meals  vancomycin  IVPB          Vital Signs Last 24 Hrs  T(C): 38.3 (21 Sep 2022 20:17), Max: 38.3 (21 Sep 2022 20:17)  T(F): 100.9 (21 Sep 2022 20:17), Max: 100.9 (21 Sep 2022 20:17)  HR: 112 (21 Sep 2022 20:17) (97 - 112)  BP: 126/71 (21 Sep 2022 16:17) (126/71 - 155/94)  BP(mean): --  RR: 20 (21 Sep 2022 20:17) (18 - 20)  SpO2: 98% (21 Sep 2022 20:17) (95% - 100%)    Parameters below as of 21 Sep 2022 10:42  Patient On (Oxygen Delivery Method): room air        LABS                        10.7   11.45 )-----------( 266      ( 21 Sep 2022 14:20 )             34.1               09-21    131<L>  |  98  |  7   ----------------------------<  246<H>  3.9   |  24  |  0.72    Ca    8.8      21 Sep 2022 14:20    TPro  8.3  /  Alb  2.2<L>  /  TBili  0.7  /  DBili  x   /  AST  9<L>  /  ALT  13  /  AlkPhos  72  09-21      ROS  REVIEW OF SYSTEMS:    CONSTITUTIONAL: No fever, weight loss, or fatigue  EYES: No eye pain, visual disturbances, or discharge  ENMT:  No difficulty hearing, tinnitus, vertigo; No sinus or throat pain  NECK: No pain or stiffness  BREASTS: No pain, masses, or nipple discharge  RESPIRATORY: No cough, wheezing, chills or hemoptysis; No shortness of breath  CARDIOVASCULAR: No chest pain, palpitations, dizziness, or leg swelling  GASTROINTESTINAL: No abdominal or epigastric pain. No nausea, vomiting, or hematemesis; No diarrhea or constipation. No melena or hematochezia.  GENITOURINARY: No dysuria, frequency, hematuria, or incontinence  NEUROLOGICAL: No headaches, memory loss, loss of strength, numbness, or tremors  SKIN: No itching, burning, rashes,    LYMPH NODES: No enlarged glands  ENDOCRINE: No heat or cold intolerance; No hair loss  MUSCULOSKELETAL: No joint pain or swelling; No muscle, back, or extremity pain  PSYCHIATRIC: No depression, anxiety, mood swings, or difficulty sleeping  HEME/LYMPH: No easy bruising, or bleeding gums  ALLERY AND IMMUNOLOGIC: No hives or eczema      PHYSICAL EXAM  LE Focused:  Pt is A&Ox3 in no acute distress  Vasc: DP/PT pulses non palpable due to edema b/l ; CFT < 3 seconds to hallux on left; edema and erythema noted to dorsal foot on left with increased TG from proximal to distal on left   Derm: Left 1st interspace maceration noted with small wound that tunneled to plantar wound measuring .2 x .2cm. < 5 cc of purulent drainage was expressed from 1st interspace wound. Left plantar foot wound sub-2nd and 3rd metatarsal heads extending plantar with hyperkeratotic edges and fibrous wound base that had scant purulent drainage < 1 cc and + PTB. No fluctuance/ soft tissue crepitus/ malodor/ ascending cellulitis/ streaking.  Neuro: Protective sensation absent b/l; light touch sensation diminished b/l   MSK: No POP to left foot wounds       IMAGING: ?xray    CULTURES:     A:  - DM uncontrolled  - Left plantar foot wound; Left 1st interspace wound with cellulitis, r/o OM        P:   Patient evaluated and Chart reviewed   Discussed diagnosis and treatment with patient  Wound flushed with betadine/saline flush   Obtained deep wound culture from left 1st interspace deep wound to be sent to Lab   Excisional debridement with 10 blade of left plantar foot wound down to and including subcutaneous tissue  Applied iodoform packing to left plantar foot and 1st interspace with DSD, ACE  X-rays evaluated; pending final read; no soft tissue emphysema noted   Continue with IV antibiotics As Per ID recc  Recommend MRI of left foot to r/o OM  Ordered ADRYAN/PVR  NWB to left foot  Discussed importance of daily foot examinations and proper shoe gear and to importance of lower Fasting Blood Glucose levels.   Podiatry to follow while in house.  Discussed with Attending Dr. Serrano

## 2022-09-21 NOTE — H&P ADULT - PROBLEM SELECTOR PLAN 1
- patient found to have open wound on the sole of the left foot with positive probe to bone, diminished sensation and decreased pulses  - afebrile but leukocytosis of 11k  - admit to medicine to r/o osteomyelitis  - since it was puncture wound will give tetanus  - will start vanc and cefepime  - ID consulted Dr. Stratton  - f/u wound cultures  - f/u MR of right foot to r/o osteomyelitis  - obtain records from [facility] for sensitivities and pathogens  - podiatry reccs appreciated

## 2022-09-21 NOTE — H&P ADULT - NSHPPHYSICALEXAM_GEN_ALL_CORE
PHYSICAL EXAM:  GENERAL: NAD, speaks in full sentences, no signs of respiratory distress  HEAD:  Atraumatic, Normocephalic  EYES: EOMI, PERRLA, conjunctiva and sclera clear  NECK: Supple, No JVD  CHEST/LUNG: Clear to auscultation bilaterally; No wheeze; No crackles; No accessory muscles used  HEART: Regular rate and rhythm; No murmurs; gallops or rubs   ABDOMEN: Soft, Nontender, Nondistended; Bowel sounds present; No guarding  EXTREMITIES:  Faint pulses on Left lower extremity, discolaration/ chronic venous stasis changes noted, 3-4 inch wound on sole of the foot with p  PSYCH: AAOx3  NEUROLOGY: non-focal  SKIN: No rashes or lesions PHYSICAL EXAM:  GENERAL: NAD, speaks in full sentences, no signs of respiratory distress  HEAD:  Atraumatic, Normocephalic  EYES: EOMI, PERRLA, conjunctiva and sclera clear  NECK: Supple, No JVD  CHEST/LUNG: Clear to auscultation bilaterally; No wheeze; No crackles; No accessory muscles used  HEART: Regular rate and rhythm; No murmurs; gallops or rubs   ABDOMEN: Soft, Nontender, Nondistended; Bowel sounds present; No guarding  EXTREMITIES:  Faint pulses on Left lower extremity, discoloration/ chronic venous stasis changes noted, 3-4 inch wound on sole of the foot with probe to bone. Ulcer noted between left great toe and 2nd left toe. Purulent discharge noted. Sensation diminished on left foot Right foot discoloration noted no open wounds, and palpable pulses . Sensation intact on right foot   PSYCH: AAOx3  NEUROLOGY: CN II-XII intact b/l 5/5 strength bilaterally in upper and lower extremities   SKIN: No rashes or lesions

## 2022-09-21 NOTE — H&P ADULT - NSHPREVIEWOFSYSTEMS_GEN_ALL_CORE
CONSTITUTIONAL: No changes in appetite or weakness.  No fever, weight loss, or fatigue  EYES: No eye pain, visual disturbances, or discharge  ENT:  No difficulty hearing, tinnitus, vertigo; No sinus or throat pain  NECK: No pain or stiffness  RESPIRATORY: No cough, wheezing, chills or hemoptysis; No Shortness of Breath  CARDIOVASCULAR: No chest pain, palpitations, passing out, dizziness, or leg swelling  GASTROINTESTINAL: No abdominal or epigastric pain. No nausea, vomiting, or hematemesis; No diarrhea or constipation. No melena or hematochezia.  GENITOURINARY: No dysuria, frequency, hematuria, or incontinence  NEUROLOGICAL: No headaches, memory loss, loss of strength. Numbness of the left foot   SKIN: Flaking darkening of b/l lower extremities   LYMPH Nodes: No enlarged glands  ENDOCRINE: No heat or cold intolerance; No hair loss  MUSCULOSKELETAL: Left lower extremity swelling, wound on the sole of the left foot  PSYCHIATRIC: No depression, anxiety, mood swings, or difficulty sleeping  HEME/LYMPH: No easy bruising, or bleeding gums  ALLERGY AND IMMUNOLOGIC: No hives or eczema

## 2022-09-21 NOTE — ED PROVIDER NOTE - NSICDXFAMILYHX_GEN_ALL_CORE_FT
FAMILY HISTORY:  Mother  Still living? No  Diabetes mellitus, type 2, Age at diagnosis: Age Unknown

## 2022-09-21 NOTE — ED PROVIDER NOTE - WR ORDER DATE AND TIME 1
[address] [address]        4/4/2018    Sanam Marion was seen 4/4/2018 at the [facility]. Please excuse Sanam from work this week  as needed  due to illness. Sanam may return to work when no fever x 1 day and feeling better.      Cordially,        Stacey Ramírez, PAC              
21-Sep-2022 13:21

## 2022-09-21 NOTE — H&P ADULT - NSHPSOCIALHISTORY_GEN_ALL_CORE
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 Denies illicit drug use, tobacco use or ETOH use. Lives with wife.

## 2022-09-21 NOTE — ED PROVIDER NOTE - PROGRESS NOTE DETAILS
Сергей: patient signed out to me. Podiatry consulted for diabetic foot wound  Admitted to hospitalist Dr. Mora

## 2022-09-21 NOTE — H&P ADULT - HISTORY OF PRESENT ILLNESS
Patient is a 40 yo male with hx of DM (last A1c >10), HTN, HLD presents with a left foot wound. The left foot wound initially started 6 weeks ago when the patient had a screw puncture hid  Patient is a 40 yo male with hx of DM (last A1c >10), HTN, HLD presents with a left foot wound. The left foot wound initially started 6 weeks ago when the patient had a screw puncture his sole of his slipper and then suffered a wound on the bottom of the left foot. Patient stated he then when to [facility] and was admitted for 7 days in which he received Unasyn and then was later discharged with PICC line for additional 2-3 weeks of IV unasyn. Patient had wound care nurse come to his home, however started to develop blistering of the left foot and enlargement of the wound on the sole of his foot. Wounds also developed between the left great toe and 2nd toe. Patient reports purulent discharge and pain. Pain is in the left foot, 10/10 burning, constant and worsened with ambulation. Patient reports fevers and chills. Patient also states he has numbness in the 2nd toe on the left side and tingling. Patient denies chest pain, sob or palpitations. Denies syncope, dizziness.     Pharmacy was called multiple times, no answer. Please confirm Insulin dosages.

## 2022-09-22 LAB
A1C WITH ESTIMATED AVERAGE GLUCOSE RESULT: 12.3 % — HIGH (ref 4–5.6)
ALBUMIN SERPL ELPH-MCNC: 2.1 G/DL — LOW (ref 3.5–5)
ALP SERPL-CCNC: 67 U/L — SIGNIFICANT CHANGE UP (ref 40–120)
ALT FLD-CCNC: 12 U/L DA — SIGNIFICANT CHANGE UP (ref 10–60)
ANION GAP SERPL CALC-SCNC: 8 MMOL/L — SIGNIFICANT CHANGE UP (ref 5–17)
APTT BLD: 31.2 SEC — SIGNIFICANT CHANGE UP (ref 27.5–35.5)
AST SERPL-CCNC: 10 U/L — SIGNIFICANT CHANGE UP (ref 10–40)
BILIRUB SERPL-MCNC: 0.5 MG/DL — SIGNIFICANT CHANGE UP (ref 0.2–1.2)
BUN SERPL-MCNC: 9 MG/DL — SIGNIFICANT CHANGE UP (ref 7–18)
CALCIUM SERPL-MCNC: 8.6 MG/DL — SIGNIFICANT CHANGE UP (ref 8.4–10.5)
CHLORIDE SERPL-SCNC: 100 MMOL/L — SIGNIFICANT CHANGE UP (ref 96–108)
CO2 SERPL-SCNC: 27 MMOL/L — SIGNIFICANT CHANGE UP (ref 22–31)
CREAT SERPL-MCNC: 0.81 MG/DL — SIGNIFICANT CHANGE UP (ref 0.5–1.3)
CRP SERPL-MCNC: 88 MG/L — HIGH
EGFR: 113 ML/MIN/1.73M2 — SIGNIFICANT CHANGE UP
ESTIMATED AVERAGE GLUCOSE: 306 MG/DL — HIGH (ref 68–114)
GLUCOSE BLDC GLUCOMTR-MCNC: 215 MG/DL — HIGH (ref 70–99)
GLUCOSE BLDC GLUCOMTR-MCNC: 263 MG/DL — HIGH (ref 70–99)
GLUCOSE BLDC GLUCOMTR-MCNC: 316 MG/DL — HIGH (ref 70–99)
GLUCOSE BLDC GLUCOMTR-MCNC: 332 MG/DL — HIGH (ref 70–99)
GLUCOSE SERPL-MCNC: 272 MG/DL — HIGH (ref 70–99)
HCT VFR BLD CALC: 31.9 % — LOW (ref 39–50)
HGB BLD-MCNC: 10.2 G/DL — LOW (ref 13–17)
INR BLD: 1.17 RATIO — HIGH (ref 0.88–1.16)
MAGNESIUM SERPL-MCNC: 1.7 MG/DL — SIGNIFICANT CHANGE UP (ref 1.6–2.6)
MCHC RBC-ENTMCNC: 26.3 PG — LOW (ref 27–34)
MCHC RBC-ENTMCNC: 32 GM/DL — SIGNIFICANT CHANGE UP (ref 32–36)
MCV RBC AUTO: 82.2 FL — SIGNIFICANT CHANGE UP (ref 80–100)
NRBC # BLD: 0 /100 WBCS — SIGNIFICANT CHANGE UP (ref 0–0)
PHOSPHATE SERPL-MCNC: 3.2 MG/DL — SIGNIFICANT CHANGE UP (ref 2.5–4.5)
PLATELET # BLD AUTO: 265 K/UL — SIGNIFICANT CHANGE UP (ref 150–400)
POTASSIUM SERPL-MCNC: 4.2 MMOL/L — SIGNIFICANT CHANGE UP (ref 3.5–5.3)
POTASSIUM SERPL-SCNC: 4.2 MMOL/L — SIGNIFICANT CHANGE UP (ref 3.5–5.3)
PROT SERPL-MCNC: 7.8 G/DL — SIGNIFICANT CHANGE UP (ref 6–8.3)
PROTHROM AB SERPL-ACNC: 13.9 SEC — HIGH (ref 10.5–13.4)
RBC # BLD: 3.88 M/UL — LOW (ref 4.2–5.8)
RBC # FLD: 13.7 % — SIGNIFICANT CHANGE UP (ref 10.3–14.5)
SODIUM SERPL-SCNC: 135 MMOL/L — SIGNIFICANT CHANGE UP (ref 135–145)
WBC # BLD: 9.28 K/UL — SIGNIFICANT CHANGE UP (ref 3.8–10.5)
WBC # FLD AUTO: 9.28 K/UL — SIGNIFICANT CHANGE UP (ref 3.8–10.5)

## 2022-09-22 PROCEDURE — 99255 IP/OBS CONSLTJ NEW/EST HI 80: CPT

## 2022-09-22 PROCEDURE — 99254 IP/OBS CNSLTJ NEW/EST MOD 60: CPT

## 2022-09-22 PROCEDURE — 99232 SBSQ HOSP IP/OBS MODERATE 35: CPT

## 2022-09-22 PROCEDURE — 99222 1ST HOSP IP/OBS MODERATE 55: CPT

## 2022-09-22 PROCEDURE — 99233 SBSQ HOSP IP/OBS HIGH 50: CPT

## 2022-09-22 PROCEDURE — 99253 IP/OBS CNSLTJ NEW/EST LOW 45: CPT

## 2022-09-22 RX ORDER — INSULIN LISPRO 100/ML
5 VIAL (ML) SUBCUTANEOUS
Refills: 0 | Status: DISCONTINUED | OUTPATIENT
Start: 2022-09-22 | End: 2022-09-23

## 2022-09-22 RX ORDER — ASPIRIN/CALCIUM CARB/MAGNESIUM 324 MG
1 TABLET ORAL
Qty: 0 | Refills: 0 | DISCHARGE

## 2022-09-22 RX ORDER — SITAGLIPTIN AND METFORMIN HYDROCHLORIDE 500; 50 MG/1; MG/1
1 TABLET, FILM COATED ORAL
Qty: 0 | Refills: 0 | DISCHARGE

## 2022-09-22 RX ORDER — ATORVASTATIN CALCIUM 80 MG/1
1 TABLET, FILM COATED ORAL
Qty: 0 | Refills: 0 | DISCHARGE

## 2022-09-22 RX ORDER — INSULIN GLARGINE 100 [IU]/ML
20 INJECTION, SOLUTION SUBCUTANEOUS EVERY MORNING
Refills: 0 | Status: DISCONTINUED | OUTPATIENT
Start: 2022-09-22 | End: 2022-09-23

## 2022-09-22 RX ORDER — ATORVASTATIN CALCIUM 80 MG/1
40 TABLET, FILM COATED ORAL AT BEDTIME
Refills: 0 | Status: DISCONTINUED | OUTPATIENT
Start: 2022-09-22 | End: 2022-09-26

## 2022-09-22 RX ADMIN — Medication 3: at 08:23

## 2022-09-22 RX ADMIN — Medication 250 MILLIGRAM(S): at 18:09

## 2022-09-22 RX ADMIN — ATORVASTATIN CALCIUM 40 MILLIGRAM(S): 80 TABLET, FILM COATED ORAL at 21:27

## 2022-09-22 RX ADMIN — INSULIN GLARGINE 20 UNIT(S): 100 INJECTION, SOLUTION SUBCUTANEOUS at 15:44

## 2022-09-22 RX ADMIN — CEFEPIME 100 MILLIGRAM(S): 1 INJECTION, POWDER, FOR SOLUTION INTRAMUSCULAR; INTRAVENOUS at 18:03

## 2022-09-22 RX ADMIN — HEPARIN SODIUM 5000 UNIT(S): 5000 INJECTION INTRAVENOUS; SUBCUTANEOUS at 21:27

## 2022-09-22 RX ADMIN — Medication 10 MILLIGRAM(S): at 06:18

## 2022-09-22 RX ADMIN — HEPARIN SODIUM 5000 UNIT(S): 5000 INJECTION INTRAVENOUS; SUBCUTANEOUS at 15:46

## 2022-09-22 RX ADMIN — Medication 5 UNIT(S): at 17:25

## 2022-09-22 RX ADMIN — Medication 4: at 12:03

## 2022-09-22 RX ADMIN — Medication 4: at 17:25

## 2022-09-22 RX ADMIN — Medication 250 MILLIGRAM(S): at 06:21

## 2022-09-22 RX ADMIN — CEFEPIME 100 MILLIGRAM(S): 1 INJECTION, POWDER, FOR SOLUTION INTRAMUSCULAR; INTRAVENOUS at 08:24

## 2022-09-22 RX ADMIN — Medication 81 MILLIGRAM(S): at 12:02

## 2022-09-22 RX ADMIN — HEPARIN SODIUM 5000 UNIT(S): 5000 INJECTION INTRAVENOUS; SUBCUTANEOUS at 06:18

## 2022-09-22 NOTE — CONSULT NOTE ADULT - NS ATTEND AMEND GEN_ALL_CORE FT
42 yo w/ infected diabetic foot wound  varicose veins  no evidence of peripheral arterial disease  palpable pedal pulses  care as per podiatry  follow up in office for varicose veins - can call [phone] to make appointment with me for venous insufficiency work-up

## 2022-09-22 NOTE — PROGRESS NOTE ADULT - PROBLEM SELECTOR PLAN 1
- P/w open wound on the sole of the left foot with positive probe to bone, diminished sensation and decreased pulses  - Afebrile w/ leukocytosis of 11k  - S/p Tetanus  - C/w Vancomycin and Cefepime  - Sx wound cx pending-f/u results   - Right foot MR to r/o osteomyelitis  - Obtain records from [facility] for sensitivities and pathogens  - Podiatrist-Dr. Mora consulted, appreciated  - ID-Dr. Stratton consulted, appreciated - P/w open wound on the sole of the left foot with positive probe to bone, diminished sensation and decreased pulses  - Afebrile w/ leukocytosis of 11k  - S/p Tetanus  - C/w Vancomycin and Cefepime  - Blood cx and Sx wound cx pending-f/u results   - Right foot MR to r/o osteomyelitis  - Obtain records from [facility] for sensitivities and pathogens  - Podiatrist-Dr. Mora consulted, appreciated  - ID-Dr. Stratton consulted, appreciated - P/w open wound on the sole of the left foot with positive probe to bone, diminished sensation and decreased pulses  - Afebrile w/ leukocytosis of 11k  - S/p Tetanus  - C/w Vancomycin and Cefepime  - Blood cx and Sx wound cx pending-f/u results   - Left foot MR to r/o osteomyelitis  - Obtain records from [facility] for sensitivities and pathogens  - Podiatrist-Dr. Mora consulted, appreciated  - ID-Dr. Stratton consulted, appreciated

## 2022-09-22 NOTE — CONSULT NOTE ADULT - SUBJECTIVE AND OBJECTIVE BOX
HPI:  Patient is a 42 yo male with hx of DM (last A1c >10), HTN, HLD presents with a left foot wound. The left foot wound initially started 6 weeks ago when the patient had a screw puncture his sole of his slipper and then suffered a wound on the bottom of the left foot. Patient stated he then when to [facility] and was admitted for 7 days in which he received Unasyn and then was later discharged with PICC line for additional 2-3 weeks of IV unasyn. Patient had wound care nurse come to his home, however started to develop blistering of the left foot and enlargement of the wound on the sole of his foot. Wounds also developed between the left great toe and 2nd toe. Patient reports purulent discharge and pain. Pain is in the left foot, 10/10 burning, constant and worsened with ambulation. Patient reports fevers and chills. Patient also states he has numbness in the 2nd toe on the left side and tingling. Patient denies chest pain, sob or palpitations. Denies syncope, dizziness.     Pharmacy was called multiple times, no answer. Please confirm Insulin dosages.  (21 Sep 2022 18:46)    VASCULAR CONSULTATION:  Pt seen and examined at bedside for vascular consultation due to poor healing left foot wound. Pt was admitted to [facility] ~1 month ago due to poor healing left plantar wound after stepping on a needle. Pt states he was admitted for I&D of abscess and  IV antibiotics. He states he was discharged weeks IV antibiotics ~2 weeks as well. Pt states the infection worsened migrating distally. He noted blistering in between the left first and second toe. Patient states he had uncontrolled diabetes however since his admission to [facility] he was prescribed insulin and since then his glucose has been in the low 100s. Denied any fevers or chills  Admits to hx of surgical intervention for varicose veins. States he wears compression stockings every day. States lower extremity swelling has improved with the wearing of the compression stockings. He states he lost follow up with his vascular surgeon since he moved from [facility].  Denied numbness or tingling of lower extremities or pain with ambulation prior to this injury.     PAST MEDICAL & SURGICAL HISTORY:  Obesity (BMI 30.0-34.9)  HLD (hyperlipidemia)  Hypertension  Diabetes mellitus, type 2  GERD (gastroesophageal reflux disease)  No significant past surgical history    MEDICATIONS  (STANDING):  aspirin  chewable 81 milliGRAM(s) Oral daily  atorvastatin 40 milliGRAM(s) Oral at bedtime  cefepime   IVPB      cefepime   IVPB 1000 milliGRAM(s) IV Intermittent every 12 hours  enalapril 10 milliGRAM(s) Oral daily  glucagon  Injectable 1 milliGRAM(s) IntraMuscular once  heparin   Injectable 5000 Unit(s) SubCutaneous every 8 hours  influenza   Vaccine 0.5 milliLiter(s) IntraMuscular once  insulin glargine Injectable (LANTUS) 20 Unit(s) SubCutaneous every morning  insulin lispro (ADMELOG) corrective regimen sliding scale   SubCutaneous three times a day before meals  insulin lispro Injectable (ADMELOG) 5 Unit(s) SubCutaneous three times a day before meals  vancomycin  IVPB 1750 milliGRAM(s) IV Intermittent every 12 hours    MEDICATIONS  (PRN):  acetaminophen     Tablet .. 650 milliGRAM(s) Oral every 6 hours PRN Temp greater or equal to 38C (100.4F), Mild Pain (1 - 3)    Allergies  No Known Allergies    Vital Signs Last 24 Hrs  T(C): 37.6 (22 Sep 2022 13:38), Max: 38.3 (21 Sep 2022 20:17)  T(F): 99.7 (22 Sep 2022 13:38), Max: 100.9 (21 Sep 2022 20:17)  HR: 99 (22 Sep 2022 13:38) (88 - 112)  BP: 140/95 (22 Sep 2022 13:38) (140/95 - 155/99)  RR: 18 (22 Sep 2022 13:38) (17 - 20)  SpO2: 98% (22 Sep 2022 13:38) (96% - 98%)    Parameters below as of 22 Sep 2022 13:38  Patient On (Oxygen Delivery Method): room air    Physical:  Gen: A&Ox3. NAD  Chest: respiration unlabored  Vasc: DP/PT palpable bilaterally, skin warm to touch bilaterally. Hyperpigmentation noted on skin.   Left foot with open wound on plantar aspect, packed with iodoform. Small opening noted in first interspace, packed with iodoform.  Edema of lower extremities, L>R.    LABS:                        10.2   9.28  )-----------( 265      ( 22 Sep 2022 07:05 )             31.9              09-22    135  |  100  |  9   ----------------------------<  272<H>  4.2   |  27  |  0.81    Ca    8.6      22 Sep 2022 07:05  Phos  3.2     09-22  Mg     1.7     09-22    TPro  7.8  /  Alb  2.1<L>  /  TBili  0.5  /  DBili  x   /  AST  10  /  ALT  12  /  AlkPhos  67  09-22            PT/INR - ( 22 Sep 2022 07:05 )   PT: 13.9 sec;   INR: 1.17 ratio         PTT - ( 22 Sep 2022 07:05 )  PTT:31.2 sec

## 2022-09-22 NOTE — CONSULT NOTE ADULT - REASON FOR ADMISSION
Left foot wound r/o Osteomyelitis

## 2022-09-22 NOTE — CONSULT NOTE ADULT - ASSESSMENT
41M with hx of varicose veins and uncontrolled diabetes presenting with worsening infection of left foot wound  afebrile  ADRYAN/PVR reviewed  Pt with palpable pulses    -Infection likely secondary to uncontrolled diabetes  -No vascular intervention warranted at this time  -Local wound care per podiatry  -Remainder of care per primary team  41M with hx of varicose veins and uncontrolled diabetes presenting with worsening infection of left foot wound  afebrile  ADRYAN/PVR reviewed  Pt with palpable pulses    -Infection likely secondary to uncontrolled diabetes  -DM control, ISS  -IV abx  -No vascular intervention warranted at this time  -Local wound care per podiatry  -Remainder of care per primary team

## 2022-09-22 NOTE — PROGRESS NOTE ADULT - ASSESSMENT
41 year old male with past medical history of DM (last A1c >10), HTN, HLD.   Presented with a left foot wound. The left foot wound initially started 6 weeks ago when the patient had a screw puncture his sole of his slipper and then suffered a wound on the bottom of the left foot. Patient stated he then when to [facility] and was admitted for 7 days in which he received Unasyn and then was later discharged with a PICC line for additional 2-3 weeks of IV Unasyn.  Patient received home nursing wound care.  However, patient started to develop blistering of the left foot and enlargement of the wound on the sole of his foot.  Additionally, a wound developed between the left great toe and 2nd toe.  Patient reported purulent discharge and pain.  Patient reported the pain was in the left foot, rated 10/10 burning, constant and worsened with ambulation. Patient reported fevers and chills. Patient also reported numbness in the 2nd toe on the left side and tingling.  Admitted for Left Foot Open Wound and to rule out OM.

## 2022-09-22 NOTE — PROGRESS NOTE ADULT - SUBJECTIVE AND OBJECTIVE BOX
Podiatry Interval: Pt is seen bedside with girlfriend present in no acute distress. Admits that yesterday he had chills however; today he does not experience chills anymore. Denies any pain to left foot wound. Denies any overnight acute events. Denies further constitutional symptoms. Denies further pedal complaints.     Podiatry HPI: Pt is a 41M who presents to ED with his girlfriend with a chief complaint of worsening left foot wound. Pt states about 1.5-2 months prior, he was walking in work boots when he stepped on a metal screw. Pt admits he didn't feel it initially due to his neuropathy from diabetes and it wasn't until he noticed blood that he saw a wound. Following this, pt arrived at [facility] where he was admitted for 7 days where they did a bedside I&D and given Unasyn and later discharged on IV picc line with more unasyn. Pt states he previously had a home health nurse changing his foot dressings every other day. Admits he noticed a blistering that worsened to bottom of left foot and that he had a lot of drainage to left 1st interspace with pinpoint wound. Pt states today, he experienced chills and burning pain that worsened when walking. Denies further constitutional symptoms. Denies recent acute trauma. Denies further pedal complaints.     Patient is a 41y old  Male who presents with a chief complaint of Left foot wound r/o Osteomyelitis (21 Sep 2022 18:46)      HPI:  Patient is a 40 yo male with hx of DM (last A1c >10), HTN, HLD presents with a left foot wound. The left foot wound initially started 6 weeks ago when the patient had a screw puncture his sole of his slipper and then suffered a wound on the bottom of the left foot. Patient stated he then when to [facility] and was admitted for 7 days in which he received Unasyn and then was later discharged with PICC line for additional 2-3 weeks of IV unasyn. Patient had wound care nurse come to his home, however started to develop blistering of the left foot and enlargement of the wound on the sole of his foot. Wounds also developed between the left great toe and 2nd toe. Patient reports purulent discharge and pain. Pain is in the left foot, 10/10 burning, constant and worsened with ambulation. Patient reports fevers and chills. Patient also states he has numbness in the 2nd toe on the left side and tingling. Patient denies chest pain, sob or palpitations. Denies syncope, dizziness.     Pharmacy was called multiple times, no answer. Please confirm Insulin dosages.  (21 Sep 2022 18:46)    Patient admits to  (-) Fevers, (-) Chills, (-) Nausea, (-) Vomiting, (-) Shortness of Breath (-) calf pain (-) chest pain     Medications acetaminophen     Tablet .. 650 milliGRAM(s) Oral every 6 hours PRN  aspirin  chewable 81 milliGRAM(s) Oral daily  atorvastatin 40 milliGRAM(s) Oral at bedtime  cefepime   IVPB      cefepime   IVPB 1000 milliGRAM(s) IV Intermittent every 12 hours  enalapril 10 milliGRAM(s) Oral daily  glucagon  Injectable 1 milliGRAM(s) IntraMuscular once  heparin   Injectable 5000 Unit(s) SubCutaneous every 8 hours  influenza   Vaccine 0.5 milliLiter(s) IntraMuscular once  insulin glargine Injectable (LANTUS) 20 Unit(s) SubCutaneous every morning  insulin lispro (ADMELOG) corrective regimen sliding scale   SubCutaneous three times a day before meals  insulin lispro Injectable (ADMELOG) 5 Unit(s) SubCutaneous three times a day before meals  vancomycin  IVPB 1750 milliGRAM(s) IV Intermittent every 12 hours    FHDiabetes mellitus, type 2 (Mother)    ,   PMHSeasonal allergies    Obesity (BMI 30.0-34.9)    Hydrocele, bilateral    HLD (hyperlipidemia)    Hypertension    Diabetes mellitus, type 2    PVD (peripheral vascular disease)    GERD (gastroesophageal reflux disease)    Vitamin D deficiency       PSHNo significant past surgical history        Labs                          10.2   9.28  )-----------( 265      ( 22 Sep 2022 07:05 )             31.9      09-22    135  |  100  |  9   ----------------------------<  272<H>  4.2   |  27  |  0.81    Ca    8.6      22 Sep 2022 07:05  Phos  3.2     09-22  Mg     1.7     09-22    TPro  7.8  /  Alb  2.1<L>  /  TBili  0.5  /  DBili  x   /  AST  10  /  ALT  12  /  AlkPhos  67  09-22     Vital Signs Last 24 Hrs  T(C): 37.6 (22 Sep 2022 13:38), Max: 38.3 (21 Sep 2022 20:17)  T(F): 99.7 (22 Sep 2022 13:38), Max: 100.9 (21 Sep 2022 20:17)  HR: 99 (22 Sep 2022 13:38) (88 - 112)  BP: 140/95 (22 Sep 2022 13:38) (140/95 - 155/99)  BP(mean): --  RR: 18 (22 Sep 2022 13:38) (17 - 20)  SpO2: 98% (22 Sep 2022 13:38) (96% - 98%)    Parameters below as of 22 Sep 2022 13:38  Patient On (Oxygen Delivery Method): room air      Sedimentation Rate, Erythrocyte: 96 mm/Hr (09-21-22 @ 14:20)         C-Reactive Protein, Serum: 88 mg/L (09-21-22 @ 14:20)   WBC Count: 9.28 K/uL (09-22-22 @ 07:05)      ROS unremarkable outside of HPI    PHYSICAL EXAM  LE Focused:  Pt is A&Ox3 in no acute distress  Vasc: DP/PT pulses non palpable due to edema b/l ; CFT < 3 seconds to hallux on left; edema and erythema noted to dorsal foot on left with increased TG from proximal to distal on left   Derm: Left 1st interspace maceration noted with small wound that tunneled to plantar wound measuring .2 x .2cm. 1 cc of purulent drainage was expressed from plantar left wound when tracking laterally. Left plantar foot wound sub-2nd and 3rd metatarsal heads extending plantar with hyperkeratotic edges and fibrous wound base that had scant purulent drainage < 1 cc and + PTB.   Neuro: Protective sensation absent b/l; light touch sensation diminished b/l   MSK: No POP to left foot wounds       IMAGING: ?xray    CULTURES:     A:  - DM uncontrolled  - Left plantar foot wound; Left 1st interspace wound         P:   Patient evaluated and Chart reviewed   Discussed diagnosis and treatment with patient  Wound flushed with betadine/saline flush   Culture pending   Applied iodoform packing to left plantar foot and 1st interspace with DSD, ACE  X-rays evaluated and reviewed   Continue with IV antibiotics As Per ID recc  Recommend Endocrinology for glycemic control   Recommend MRI of left foot to r/o OM for potential surgical planning Monday   ADRYAN/PVR reviewed  NWB to left foot  Discussed importance of daily foot examinations and proper shoe gear and to importance of lower Fasting Blood Glucose levels.   Podiatry to follow while in house.  Discussed with Attending Dr. Serrano    Podiatry Interval: Pt is seen bedside with girlfriend present in no acute distress. Admits that yesterday he had chills however; today he does not experience chills anymore. Denies any pain to left foot wound. Denies any overnight acute events. Denies further constitutional symptoms. Denies further pedal complaints.     Podiatry HPI: Pt is a 41M who presents to ED with his girlfriend with a chief complaint of worsening left foot wound. Pt states about 1.5-2 months prior, he was walking in work boots when he stepped on a metal screw. Pt admits he didn't feel it initially due to his neuropathy from diabetes and it wasn't until he noticed blood that he saw a wound. Following this, pt arrived at [facility] where he was admitted for 7 days where they did a bedside I&D and given Unasyn and later discharged on IV picc line with more unasyn. Pt states he previously had a home health nurse changing his foot dressings every other day. Admits he noticed a blistering that worsened to bottom of left foot and that he had a lot of drainage to left 1st interspace with pinpoint wound. Pt states today, he experienced chills and burning pain that worsened when walking. Denies further constitutional symptoms. Denies recent acute trauma. Denies further pedal complaints.     Patient is a 41y old  Male who presents with a chief complaint of Left foot wound r/o Osteomyelitis (21 Sep 2022 18:46)      HPI:  Patient is a 42 yo male with hx of DM (last A1c >10), HTN, HLD presents with a left foot wound. The left foot wound initially started 6 weeks ago when the patient had a screw puncture his sole of his slipper and then suffered a wound on the bottom of the left foot. Patient stated he then when to [facility] and was admitted for 7 days in which he received Unasyn and then was later discharged with PICC line for additional 2-3 weeks of IV unasyn. Patient had wound care nurse come to his home, however started to develop blistering of the left foot and enlargement of the wound on the sole of his foot. Wounds also developed between the left great toe and 2nd toe. Patient reports purulent discharge and pain. Pain is in the left foot, 10/10 burning, constant and worsened with ambulation. Patient reports fevers and chills. Patient also states he has numbness in the 2nd toe on the left side and tingling. Patient denies chest pain, sob or palpitations. Denies syncope, dizziness.     Pharmacy was called multiple times, no answer. Please confirm Insulin dosages.  (21 Sep 2022 18:46)    Patient admits to  (-) Fevers, (-) Chills, (-) Nausea, (-) Vomiting, (-) Shortness of Breath (-) calf pain (-) chest pain     Medications acetaminophen     Tablet .. 650 milliGRAM(s) Oral every 6 hours PRN  aspirin  chewable 81 milliGRAM(s) Oral daily  atorvastatin 40 milliGRAM(s) Oral at bedtime  cefepime   IVPB      cefepime   IVPB 1000 milliGRAM(s) IV Intermittent every 12 hours  enalapril 10 milliGRAM(s) Oral daily  glucagon  Injectable 1 milliGRAM(s) IntraMuscular once  heparin   Injectable 5000 Unit(s) SubCutaneous every 8 hours  influenza   Vaccine 0.5 milliLiter(s) IntraMuscular once  insulin glargine Injectable (LANTUS) 20 Unit(s) SubCutaneous every morning  insulin lispro (ADMELOG) corrective regimen sliding scale   SubCutaneous three times a day before meals  insulin lispro Injectable (ADMELOG) 5 Unit(s) SubCutaneous three times a day before meals  vancomycin  IVPB 1750 milliGRAM(s) IV Intermittent every 12 hours    FHDiabetes mellitus, type 2 (Mother)    ,   PMHSeasonal allergies    Obesity (BMI 30.0-34.9)    Hydrocele, bilateral    HLD (hyperlipidemia)    Hypertension    Diabetes mellitus, type 2    PVD (peripheral vascular disease)    GERD (gastroesophageal reflux disease)    Vitamin D deficiency       PSHNo significant past surgical history        Labs                          10.2   9.28  )-----------( 265      ( 22 Sep 2022 07:05 )             31.9      09-22    135  |  100  |  9   ----------------------------<  272<H>  4.2   |  27  |  0.81    Ca    8.6      22 Sep 2022 07:05  Phos  3.2     09-22  Mg     1.7     09-22    TPro  7.8  /  Alb  2.1<L>  /  TBili  0.5  /  DBili  x   /  AST  10  /  ALT  12  /  AlkPhos  67  09-22     Vital Signs Last 24 Hrs  T(C): 37.6 (22 Sep 2022 13:38), Max: 38.3 (21 Sep 2022 20:17)  T(F): 99.7 (22 Sep 2022 13:38), Max: 100.9 (21 Sep 2022 20:17)  HR: 99 (22 Sep 2022 13:38) (88 - 112)  BP: 140/95 (22 Sep 2022 13:38) (140/95 - 155/99)  BP(mean): --  RR: 18 (22 Sep 2022 13:38) (17 - 20)  SpO2: 98% (22 Sep 2022 13:38) (96% - 98%)    Parameters below as of 22 Sep 2022 13:38  Patient On (Oxygen Delivery Method): room air      Sedimentation Rate, Erythrocyte: 96 mm/Hr (09-21-22 @ 14:20)         C-Reactive Protein, Serum: 88 mg/L (09-21-22 @ 14:20)   WBC Count: 9.28 K/uL (09-22-22 @ 07:05)      ROS unremarkable outside of HPI    PHYSICAL EXAM  LE Focused:  Pt is A&Ox3 in no acute distress  Vasc: DP/PT pulses non palpable due to edema b/l ; CFT < 3 seconds to hallux on left; edema and erythema noted to dorsal foot on left with increased TG from proximal to distal on left   Derm: Left 1st interspace maceration noted with small wound that tunneled to plantar wound measuring .2 x .2cm. 1 cc of purulent drainage was expressed from plantar left wound when tracking laterally. Left plantar foot wound sub-2nd and 3rd metatarsal heads extending plantar with hyperkeratotic edges and fibrous wound base that had scant purulent drainage < 1 cc and + PTB.   Neuro: Protective sensation absent b/l; light touch sensation diminished b/l   MSK: No POP to left foot wounds       IMAGING: ?xray    CULTURES:     A:  - DM uncontrolled  - Left plantar foot wound; Left 1st interspace wound, cellulitis        P:   Patient evaluated and Chart reviewed   Discussed diagnosis and treatment with patient  Wound flushed with betadine/saline flush   Culture pending   Applied iodoform packing to left plantar foot and 1st interspace with DSD, ACE  X-rays evaluated and reviewed   Continue with IV antibiotics As Per ID recc  Recommend Endocrinology for glycemic control   Recommend MRI of left foot to r/o OM for potential surgical planning Monday   ADRYAN/PVR reviewed  Heel WB to left foot  Discussed importance of daily foot examinations and proper shoe gear and to importance of lower Fasting Blood Glucose levels for optimal wound healing. Patient is at risk for delayed healing, non healing, worsening infection, amputation due to uncontrolled diabetes.  Podiatry to follow while in house.  Discussed with Attending Dr. Serrano

## 2022-09-22 NOTE — PROGRESS NOTE ADULT - PROBLEM SELECTOR PLAN 2
- Med rec completed 9/22/22 spoke w/ PharmacistCricket  - C/w HSS  - A1C=12.3  - Endocrinologist-Dr. Marr consult pending-f/u recommendations    - Med rec completed 9/22/22 spoke w/ PharmacistCricket - Med rec completed 9/22/22 spoke w/ PharmacistCricket  - A1C=12.3  - FS elevated   - C/w HSS  - Endocrinologist-Dr. Marr consult pending-f/u recommendations    - Med rec completed 9/22/22 spoke w/ Pharmacist-Ching

## 2022-09-22 NOTE — PROGRESS NOTE ADULT - SUBJECTIVE AND OBJECTIVE BOX
NP Note discussed with  primary attending    Patient is a 41y old  Male who presents with a chief complaint of Left foot wound r/o Osteomyelitis (21 Sep 2022 20:39)      INTERVAL HPI/OVERNIGHT EVENTS: no new complaints    MEDICATIONS  (STANDING):  aspirin  chewable 81 milliGRAM(s) Oral daily  atorvastatin 20 milliGRAM(s) Oral at bedtime  cefepime   IVPB      cefepime   IVPB 1000 milliGRAM(s) IV Intermittent every 12 hours  enalapril 10 milliGRAM(s) Oral daily  glucagon  Injectable 1 milliGRAM(s) IntraMuscular once  heparin   Injectable 5000 Unit(s) SubCutaneous every 8 hours  influenza   Vaccine 0.5 milliLiter(s) IntraMuscular once  insulin lispro (ADMELOG) corrective regimen sliding scale   SubCutaneous three times a day before meals  vancomycin  IVPB 1750 milliGRAM(s) IV Intermittent every 12 hours    MEDICATIONS  (PRN):  acetaminophen     Tablet .. 650 milliGRAM(s) Oral every 6 hours PRN Temp greater or equal to 38C (100.4F), Mild Pain (1 - 3)      __________________________________________________  REVIEW OF SYSTEMS:    CONSTITUTIONAL: No fever  EYES: No acute visual disturbances  NECK: No pain or stiffness  RESPIRATORY: No cough; No shortness of breath  CARDIOVASCULAR: No chest pain, no palpitations  GASTROINTESTINAL: No pain. No nausea or vomiting.  No diarrhea   NEUROLOGICAL: No headache or numbness, no tremors  MUSCULOSKELETAL: No joint pain, no muscle pain  GENITOURINARY: No dysuria, no frequency, no hesitancy  PSYCHIATRY: No depression , no anxiety  ALL OTHER  ROS negative        Vital Signs Last 24 Hrs  T(C): 37 (22 Sep 2022 07:20), Max: 38.3 (21 Sep 2022 20:17)  T(F): 98.6 (22 Sep 2022 07:20), Max: 100.9 (21 Sep 2022 20:17)  HR: 88 (22 Sep 2022 07:20) (88 - 112)  BP: 155/99 (22 Sep 2022 07:20) (126/71 - 155/99)  BP(mean): --  RR: 18 (22 Sep 2022 07:20) (17 - 20)  SpO2: 98% (22 Sep 2022 07:20) (95% - 98%)    Parameters below as of 22 Sep 2022 07:20  Patient On (Oxygen Delivery Method): room air        ________________________________________________  PHYSICAL EXAM:  GENERAL: NAD  HEENT: Normocephalic;  conjunctivae and sclerae clear; moist mucous membranes;   NECK : supple  CHEST/LUNG: Clear to auscultation bilaterally with good air entry   HEART: S1 S2  regular; no murmurs, gallops or rubs  ABDOMEN: Soft, Nontender, Nondistended; Bowel sounds present x 4 quad  EXTREMITIES: No cyanosis; no edema; no calf tenderness  SKIN: Warm and dry; no rash  NERVOUS SYSTEM:  Awake and alert; Oriented  to place, person and time ; no new deficits    _________________________________________________  LABS:                        10.2   9.28  )-----------( 265      ( 22 Sep 2022 07:05 )             31.9     09-22    135  |  100  |  9   ----------------------------<  272<H>  4.2   |  27  |  0.81    Ca    8.6      22 Sep 2022 07:05  Phos  3.2     09-22  Mg     1.7     09-22    TPro  7.8  /  Alb  2.1<L>  /  TBili  0.5  /  DBili  x   /  AST  10  /  ALT  12  /  AlkPhos  67  09-22    PT/INR - ( 22 Sep 2022 07:05 )   PT: 13.9 sec;   INR: 1.17 ratio         PTT - ( 22 Sep 2022 07:05 )  PTT:31.2 sec    CAPILLARY BLOOD GLUCOSE      POCT Blood Glucose.: 332 mg/dL (22 Sep 2022 11:46)  POCT Blood Glucose.: 263 mg/dL (22 Sep 2022 08:02)        RADIOLOGY & ADDITIONAL TESTS:    Imaging Personally Reviewed:  YES    Consultant(s) Notes Reviewed:   YES    Care Discussed with Consultants :     Plan of care was discussed with patient and /or primary care giver; all questions and concerns were addressed and care was aligned with patient's wishes.     NP Note discussed with  primary attending    Patient is a 41y old  Male who presents with a chief complaint of Left foot wound r/o Osteomyelitis (21 Sep 2022 20:39)      INTERVAL HPI/OVERNIGHT EVENTS: no new complaints    MEDICATIONS  (STANDING):  aspirin  chewable 81 milliGRAM(s) Oral daily  atorvastatin 20 milliGRAM(s) Oral at bedtime  cefepime   IVPB      cefepime   IVPB 1000 milliGRAM(s) IV Intermittent every 12 hours  enalapril 10 milliGRAM(s) Oral daily  glucagon  Injectable 1 milliGRAM(s) IntraMuscular once  heparin   Injectable 5000 Unit(s) SubCutaneous every 8 hours  influenza   Vaccine 0.5 milliLiter(s) IntraMuscular once  insulin lispro (ADMELOG) corrective regimen sliding scale   SubCutaneous three times a day before meals  vancomycin  IVPB 1750 milliGRAM(s) IV Intermittent every 12 hours    MEDICATIONS  (PRN):  acetaminophen     Tablet .. 650 milliGRAM(s) Oral every 6 hours PRN Temp greater or equal to 38C (100.4F), Mild Pain (1 - 3)      __________________________________________________  REVIEW OF SYSTEMS:    CONSTITUTIONAL: No fever  EYES: No acute visual disturbances  NECK: No pain or stiffness  RESPIRATORY: No cough; No shortness of breath  CARDIOVASCULAR: No chest pain, no palpitations  GASTROINTESTINAL: No pain. No nausea or vomiting.  No diarrhea   NEUROLOGICAL: No headache or numbness, no tremors  MUSCULOSKELETAL: No joint pain, no muscle pain  GENITOURINARY: No dysuria, no frequency, no hesitancy  PSYCHIATRY: No depression , no anxiety  ALL OTHER  ROS negative        Vital Signs Last 24 Hrs  T(C): 37 (22 Sep 2022 07:20), Max: 38.3 (21 Sep 2022 20:17)  T(F): 98.6 (22 Sep 2022 07:20), Max: 100.9 (21 Sep 2022 20:17)  HR: 88 (22 Sep 2022 07:20) (88 - 112)  BP: 155/99 (22 Sep 2022 07:20) (126/71 - 155/99)  BP(mean): --  RR: 18 (22 Sep 2022 07:20) (17 - 20)  SpO2: 98% (22 Sep 2022 07:20) (95% - 98%)    Parameters below as of 22 Sep 2022 07:20  Patient On (Oxygen Delivery Method): room air        ________________________________________________  PHYSICAL EXAM:  GENERAL: NAD  HEENT: Normocephalic;  conjunctivae and sclerae clear; moist mucous membranes;   NECK : supple  CHEST/LUNG: Clear to auscultation bilaterally with good air entry   HEART: S1 S2  regular; no murmurs, gallops or rubs  ABDOMEN: Soft, Nontender, Nondistended; Bowel sounds present x 4 quad  EXTREMITIES: No cyanosis; no edema; no calf tenderness.  (+) Left foot w/ ACE wrap, clean dry and intact   SKIN: Warm and dry; no rash  NERVOUS SYSTEM:  Awake and alert; Oriented  to place, person and time ; no new deficits    _________________________________________________  LABS:                        10.2   9.28  )-----------( 265      ( 22 Sep 2022 07:05 )             31.9     09-22    135  |  100  |  9   ----------------------------<  272<H>  4.2   |  27  |  0.81    Ca    8.6      22 Sep 2022 07:05  Phos  3.2     09-22  Mg     1.7     09-22    TPro  7.8  /  Alb  2.1<L>  /  TBili  0.5  /  DBili  x   /  AST  10  /  ALT  12  /  AlkPhos  67  09-22    PT/INR - ( 22 Sep 2022 07:05 )   PT: 13.9 sec;   INR: 1.17 ratio         PTT - ( 22 Sep 2022 07:05 )  PTT:31.2 sec    CAPILLARY BLOOD GLUCOSE      POCT Blood Glucose.: 332 mg/dL (22 Sep 2022 11:46)  POCT Blood Glucose.: 263 mg/dL (22 Sep 2022 08:02)        RADIOLOGY & ADDITIONAL TESTS:    Imaging Personally Reviewed:  YES    Consultant(s) Notes Reviewed:   YES    Care Discussed with Consultants :     Plan of care was discussed with patient and /or primary care giver; all questions and concerns were addressed and care was aligned with patient's wishes.     NP Note discussed with  primary attending    Patient is a 41y old  Male who presents with a chief complaint of Left foot wound r/o Osteomyelitis (21 Sep 2022 20:39)      INTERVAL HPI/OVERNIGHT EVENTS: States, "I'm OK."  No new complaints or concerns.      MEDICATIONS  (STANDING):  aspirin  chewable 81 milliGRAM(s) Oral daily  atorvastatin 20 milliGRAM(s) Oral at bedtime  cefepime   IVPB      cefepime   IVPB 1000 milliGRAM(s) IV Intermittent every 12 hours  enalapril 10 milliGRAM(s) Oral daily  glucagon  Injectable 1 milliGRAM(s) IntraMuscular once  heparin   Injectable 5000 Unit(s) SubCutaneous every 8 hours  influenza   Vaccine 0.5 milliLiter(s) IntraMuscular once  insulin lispro (ADMELOG) corrective regimen sliding scale   SubCutaneous three times a day before meals  vancomycin  IVPB 1750 milliGRAM(s) IV Intermittent every 12 hours    MEDICATIONS  (PRN):  acetaminophen     Tablet .. 650 milliGRAM(s) Oral every 6 hours PRN Temp greater or equal to 38C (100.4F), Mild Pain (1 - 3)      __________________________________________________  REVIEW OF SYSTEMS:    CONSTITUTIONAL: No fever  EYES: No acute visual disturbances  NECK: No pain or stiffness  RESPIRATORY: No cough; No shortness of breath  CARDIOVASCULAR: No chest pain, no palpitations  GASTROINTESTINAL: No pain. No nausea or vomiting.  No diarrhea   NEUROLOGICAL: No headache or numbness, no tremors  MUSCULOSKELETAL: No joint pain, no muscle pain  GENITOURINARY: No dysuria, no frequency, no hesitancy  PSYCHIATRY: No depression , no anxiety  ALL OTHER  ROS negative        Vital Signs Last 24 Hrs  T(C): 37 (22 Sep 2022 07:20), Max: 38.3 (21 Sep 2022 20:17)  T(F): 98.6 (22 Sep 2022 07:20), Max: 100.9 (21 Sep 2022 20:17)  HR: 88 (22 Sep 2022 07:20) (88 - 112)  BP: 155/99 (22 Sep 2022 07:20) (126/71 - 155/99)  RR: 18 (22 Sep 2022 07:20) (17 - 20)  SpO2: 98% (22 Sep 2022 07:20) (95% - 98%)    Parameters below as of 22 Sep 2022 07:20  Patient On (Oxygen Delivery Method): room air        ________________________________________________  PHYSICAL EXAM:  GENERAL: NAD  HEENT: Normocephalic;  conjunctivae and sclerae clear; moist mucous membranes;   NECK : supple  CHEST/LUNG: Clear to auscultation bilaterally with good air entry   HEART: S1 S2  regular; no murmurs, gallops or rubs  ABDOMEN: Soft, Nontender, Nondistended; Bowel sounds present x 4 quad  EXTREMITIES: No cyanosis; no edema; no calf tenderness.  (+) Left foot w/ ACE wrap, clean dry and intact   SKIN: Warm and dry; no rash  NERVOUS SYSTEM:  Awake and alert; Oriented  to place, person and time ; no new deficits    _________________________________________________  LABS:                        10.2   9.28  )-----------( 265      ( 22 Sep 2022 07:05 )             31.9     09-22    135  |  100  |  9   ----------------------------<  272<H>  4.2   |  27  |  0.81    Ca    8.6      22 Sep 2022 07:05  Phos  3.2     09-22  Mg     1.7     09-22    TPro  7.8  /  Alb  2.1<L>  /  TBili  0.5  /  DBili  x   /  AST  10  /  ALT  12  /  AlkPhos  67  09-22    PT/INR - ( 22 Sep 2022 07:05 )   PT: 13.9 sec;   INR: 1.17 ratio         PTT - ( 22 Sep 2022 07:05 )  PTT:31.2 sec    CAPILLARY BLOOD GLUCOSE      POCT Blood Glucose.: 332 mg/dL (22 Sep 2022 11:46)  POCT Blood Glucose.: 263 mg/dL (22 Sep 2022 08:02)        RADIOLOGY & ADDITIONAL TESTS:    Imaging Personally Reviewed:  YES    Consultant(s) Notes Reviewed:   YES    Care Discussed with Consultants :     Plan of care was discussed with patient and /or primary care giver; all questions and concerns were addressed and care was aligned with patient's wishes.

## 2022-09-22 NOTE — CONSULT NOTE ADULT - ASSESSMENT
HPI:  Patient is a 42 yo male from [facility] with hx of DM (last A1c >10), HTN, HLD presents with a left foot wound. The left foot wound initially started 6 weeks ago when the patient had a screw puncture his sole of his slipper and then suffered a wound on the bottom of the left foot. Patient stated he then when to [facility] and was admitted for 7 days in which he received Unasyn and then was later discharged with PICC line for additional 2 weeks of IV Unasyn q/6 hrs which he completed. Patient had wound care nurse come to his home, however started to develop blistering of the left foot and enlargement of the wound on the sole of his foot. Wounds also developed between the left great toe and 2nd toe. Patient reports purulent discharge and pain. Pain is in the left foot, 10/10 burning, constant and worsened with ambulation. Patient reports fevers and chills. Patient also states he has numbness in the 2nd toe on the left side and tingling. Patient denies chest pain, sob or palpitations. Denies syncope, dizziness.   He went to follow up with ID last Thursday (Dr. Jorje Astorga MD Tel. [phone]) who was concerned about worsening infection and decided to send him back to [facility] ED for admission however the pt stayed there for ~ 12 hrs waiting to be seen and decided to leave.  He decided to come to [facility] for evaluation    ALLERGIES No Known Allergies    PAST MEDICAL & SURGICAL HISTORY: Obesity (BMI 30.0-34.9), HLD (hyperlipidemia), Hypertension, Diabetes mellitus, type 2, GERD (gastroesophageal reflux disease)  No significant past surgical history  FAMILY HISTORY: Diabetes mellitus, type 2 (Mother)  SOCIAL HISTORY: denies toxic habits    REVIEW OF SYSTEMS:  CONSTITUTIONAL:  No weakness,+loan +_hills  EYES/ENT:  No visual changes;  No vertigo or throat pain   NECK:  No pain or stiffness  RESPIRATORY:  No cough, wheezing, hemoptysis; No shortness of breath  CARDIOVASCULAR:  No chest pain or palpitations  GASTROINTESTINAL:  No abdominal or epigastric pain. No nausea, vomiting, or hematemesis; No diarrhea or constipation. No melena or hematochezia.  GENITOURINARY:  No dysuria, frequency or hematuria  NEUROLOGICAL:  No numbness or weakness  MSK: no back pain, no joint pain, LLE foot ulcer, swelling and pain  SKIN:  No itching, rashes    PHYSICAL EXAM:  Vital Signs Last 24 Hrs  T(C): 37 (22 Sep 2022 07:20), Max: 38.3 (21 Sep 2022 20:17)  T(F): 98.6 (22 Sep 2022 07:20), Max: 100.9 (21 Sep 2022 20:17)  HR: 88 (22 Sep 2022 07:20) (88 - 112)  BP: 155/99 (22 Sep 2022 07:20) (126/71 - 155/99)  RR: 18 (22 Sep 2022 07:20) (17 - 20)  SpO2: 98% (22 Sep 2022 07:20) (95% - 98%)    Parameters below as of 22 Sep 2022 07:20 Patient On (Oxygen Delivery Method): room air    GENERAL: NAD, non-toxic, pleasant, lying in bed comfortably  HEAD:  Atraumatic, Normocephalic  EYES: EOMI, PERRLA, conjunctiva and sclera clear  ENT: Moist mucous membranes  NECK: Supple, No JVD  CHEST/LUNG: Clear to auscultation bilaterally; No rales, rhonchi, wheezing, or rubs. Unlabored respirations  HEART: Regular rate and rhythm; No murmurs, rubs, or gallops  ABDOMEN: BSx4; Soft, nontender, nondistended  : no dysuria  EXTREMITIES:  2+ Peripheral Pulses, brisk capillary refill. No clubbing, cyanosis, or edema  NERVOUS SYSTEM:  A&Ox3. No sensory deficits, no motor deficits  SKIN: No rashes or lesions, dry and warm skin  MSK: no back pain, Left foot sole ulcer S/P bedside I&D by podiatry with wound packing, erythema, calor +, maceration btw 1&2 toes     LABS/DIAGNOSTIC TESTS:                        10.2   9.28  )-----------( 265      ( 22 Sep 2022 07:05 )             31.9     WBC Count: 9.28 K/uL (09-22 @ 07:05)  WBC Count: 11.45 K/uL (09-21 @ 14:20)    09-22    135  |  100  |  9   ----------------------------<  272<H>  4.2   |  27  |  0.81    Ca    8.6      22 Sep 2022 07:05  Phos  3.2     09-22  Mg     1.7     09-22    TPro  7.8  /  Alb  2.1<L>  /  TBili  0.5  /  DBili  x   /  AST  10  /  ALT  12  /  AlkPhos  67  09-22    CULTURES: pending    RADIOLOGY: < from: Xray Foot AP + Lateral + Oblique, Left (09.21.22 @ 15:35) >  IMPRESSION:  Plantar soft tissue ulceration with soft tissue swelling. No radiographic evidence of osteomyelitis. If osteomyelitis is considered  despite conservative therapy, and soft   tissue / bone infection requires further assessment, follow-up MRI recommended.    ANTIBIOTICS:  cefepime   IVPB 1000 every 12 hours (9/21- )  vancomycin  IVPB 1750 every 12 hours(9/21- )    IV LINES:pIV    IMPRESSION AND PLAN:  42 yo male from [facility] with hx of DM (last A1c >10), HTN, HLD presents with a left foot wound that developed ~ 6 weeks ago after screw puncture initially had a small wound that he did not pay attention to which later developed into an ulcer. He went to [facility] where I&D and apparently abscess drain was performed. He was on IV Unasyn for aprox 2-3 weeks. After completing IV abx he noticed worsening of the edema and erythema to his L foot associated to blistering of the area and peeling, + pain and new wound in btw his 1&2 L toes. While in follow up visit with ID doctor he was referred to [facility] ED for admission and further tx.   C-Reactive Protein, Serum: 88 mg/L (09-21-22 @ 14:20)  Sedimentation Rate, Erythrocyte: 96 mm/Hr (09.21.22 @ 14:20)    -DFI (SSTI) vs DFO after puncture trauma to L foot sole  -Uncontrolled DM    Continue Cefepime 1g q/8 hrs IV  Continue Vancomycin IV, check trough before 4th dose  Trend ESR and CRP  Follow up wound cx  Follow up MRI L foot  F/up Podiatry for I&D and tissue cx/Bx.     Please reach ID with any questions or concerns  Dr. Natalie Wilhelm  Tel. [phone]  Available in Teams

## 2022-09-23 ENCOUNTER — TRANSCRIPTION ENCOUNTER (OUTPATIENT)
Age: 42
End: 2022-09-23

## 2022-09-23 DIAGNOSIS — M86.172 OTHER ACUTE OSTEOMYELITIS, LEFT ANKLE AND FOOT: ICD-10-CM

## 2022-09-23 DIAGNOSIS — Z02.9 ENCOUNTER FOR ADMINISTRATIVE EXAMINATIONS, UNSPECIFIED: ICD-10-CM

## 2022-09-23 DIAGNOSIS — L03.90 CELLULITIS, UNSPECIFIED: ICD-10-CM

## 2022-09-23 LAB
ALBUMIN, RANDOM URINE: 80.7 MG/DL — SIGNIFICANT CHANGE UP
ANION GAP SERPL CALC-SCNC: 9 MMOL/L — SIGNIFICANT CHANGE UP (ref 5–17)
BUN SERPL-MCNC: 10 MG/DL — SIGNIFICANT CHANGE UP (ref 7–18)
CALCIUM SERPL-MCNC: 9.1 MG/DL — SIGNIFICANT CHANGE UP (ref 8.4–10.5)
CHLORIDE SERPL-SCNC: 100 MMOL/L — SIGNIFICANT CHANGE UP (ref 96–108)
CO2 SERPL-SCNC: 27 MMOL/L — SIGNIFICANT CHANGE UP (ref 22–31)
COLLECT DURATION TIME UR: SIGNIFICANT CHANGE UP
CREAT SERPL-MCNC: 0.73 MG/DL — SIGNIFICANT CHANGE UP (ref 0.5–1.3)
EGFR: 116 ML/MIN/1.73M2 — SIGNIFICANT CHANGE UP
GLUCOSE BLDC GLUCOMTR-MCNC: 215 MG/DL — HIGH (ref 70–99)
GLUCOSE BLDC GLUCOMTR-MCNC: 217 MG/DL — HIGH (ref 70–99)
GLUCOSE BLDC GLUCOMTR-MCNC: 280 MG/DL — HIGH (ref 70–99)
GLUCOSE BLDC GLUCOMTR-MCNC: 295 MG/DL — HIGH (ref 70–99)
GLUCOSE SERPL-MCNC: 239 MG/DL — HIGH (ref 70–99)
HCT VFR BLD CALC: 32.9 % — LOW (ref 39–50)
HGB BLD-MCNC: 10.3 G/DL — LOW (ref 13–17)
MCHC RBC-ENTMCNC: 25.6 PG — LOW (ref 27–34)
MCHC RBC-ENTMCNC: 31.3 GM/DL — LOW (ref 32–36)
MCV RBC AUTO: 81.8 FL — SIGNIFICANT CHANGE UP (ref 80–100)
MICROALBUMIN 24H UR-MRATE: SIGNIFICANT CHANGE UP MG/24HR (ref 0–29.9)
MICROALBUMIN ?TM UR-MRATE: SIGNIFICANT CHANGE UP UG/MIN (ref 0–19.9)
NRBC # BLD: 0 /100 WBCS — SIGNIFICANT CHANGE UP (ref 0–0)
PLATELET # BLD AUTO: 280 K/UL — SIGNIFICANT CHANGE UP (ref 150–400)
POTASSIUM SERPL-MCNC: 4 MMOL/L — SIGNIFICANT CHANGE UP (ref 3.5–5.3)
POTASSIUM SERPL-SCNC: 4 MMOL/L — SIGNIFICANT CHANGE UP (ref 3.5–5.3)
RBC # BLD: 4.02 M/UL — LOW (ref 4.2–5.8)
RBC # FLD: 13.5 % — SIGNIFICANT CHANGE UP (ref 10.3–14.5)
SODIUM SERPL-SCNC: 136 MMOL/L — SIGNIFICANT CHANGE UP (ref 135–145)
TOTAL VOLUME - 24 HOUR: SIGNIFICANT CHANGE UP
URINE CREATININE CALCULATION: SIGNIFICANT CHANGE UP G/24 H (ref 1–2)
VANCOMYCIN TROUGH SERPL-MCNC: 5.7 UG/ML — LOW (ref 10–20)
WBC # BLD: 8.83 K/UL — SIGNIFICANT CHANGE UP (ref 3.8–10.5)
WBC # FLD AUTO: 8.83 K/UL — SIGNIFICANT CHANGE UP (ref 3.8–10.5)

## 2022-09-23 PROCEDURE — 99232 SBSQ HOSP IP/OBS MODERATE 35: CPT

## 2022-09-23 PROCEDURE — 73718 MRI LOWER EXTREMITY W/O DYE: CPT | Mod: 26,LT

## 2022-09-23 PROCEDURE — 99233 SBSQ HOSP IP/OBS HIGH 50: CPT

## 2022-09-23 RX ORDER — INSULIN GLARGINE 100 [IU]/ML
24 INJECTION, SOLUTION SUBCUTANEOUS EVERY MORNING
Refills: 0 | Status: DISCONTINUED | OUTPATIENT
Start: 2022-09-24 | End: 2022-09-24

## 2022-09-23 RX ORDER — CEFEPIME 1 G/1
INJECTION, POWDER, FOR SOLUTION INTRAMUSCULAR; INTRAVENOUS
Refills: 0 | Status: DISCONTINUED | OUTPATIENT
Start: 2022-09-23 | End: 2022-09-23

## 2022-09-23 RX ORDER — INSULIN LISPRO 100/ML
VIAL (ML) SUBCUTANEOUS AT BEDTIME
Refills: 0 | Status: DISCONTINUED | OUTPATIENT
Start: 2022-09-23 | End: 2022-09-26

## 2022-09-23 RX ORDER — PIPERACILLIN AND TAZOBACTAM 4; .5 G/20ML; G/20ML
3.38 INJECTION, POWDER, LYOPHILIZED, FOR SOLUTION INTRAVENOUS ONCE
Refills: 0 | Status: COMPLETED | OUTPATIENT
Start: 2022-09-23 | End: 2022-09-23

## 2022-09-23 RX ORDER — INSULIN GLARGINE 100 [IU]/ML
4 INJECTION, SOLUTION SUBCUTANEOUS ONCE
Refills: 0 | Status: COMPLETED | OUTPATIENT
Start: 2022-09-23 | End: 2022-09-23

## 2022-09-23 RX ORDER — INSULIN LISPRO 100/ML
6 VIAL (ML) SUBCUTANEOUS
Refills: 0 | Status: DISCONTINUED | OUTPATIENT
Start: 2022-09-23 | End: 2022-09-24

## 2022-09-23 RX ORDER — PIPERACILLIN AND TAZOBACTAM 4; .5 G/20ML; G/20ML
3.38 INJECTION, POWDER, LYOPHILIZED, FOR SOLUTION INTRAVENOUS ONCE
Refills: 0 | Status: COMPLETED | OUTPATIENT
Start: 2022-09-24 | End: 2022-09-24

## 2022-09-23 RX ORDER — INSULIN LISPRO 100/ML
VIAL (ML) SUBCUTANEOUS
Refills: 0 | Status: DISCONTINUED | OUTPATIENT
Start: 2022-09-23 | End: 2022-09-26

## 2022-09-23 RX ORDER — CEFEPIME 1 G/1
1000 INJECTION, POWDER, FOR SOLUTION INTRAMUSCULAR; INTRAVENOUS ONCE
Refills: 0 | Status: COMPLETED | OUTPATIENT
Start: 2022-09-23 | End: 2022-09-23

## 2022-09-23 RX ORDER — PIPERACILLIN AND TAZOBACTAM 4; .5 G/20ML; G/20ML
3.38 INJECTION, POWDER, LYOPHILIZED, FOR SOLUTION INTRAVENOUS EVERY 8 HOURS
Refills: 0 | Status: DISCONTINUED | OUTPATIENT
Start: 2022-09-24 | End: 2022-09-26

## 2022-09-23 RX ADMIN — Medication 4: at 17:36

## 2022-09-23 RX ADMIN — Medication 6 UNIT(S): at 17:37

## 2022-09-23 RX ADMIN — HEPARIN SODIUM 5000 UNIT(S): 5000 INJECTION INTRAVENOUS; SUBCUTANEOUS at 21:37

## 2022-09-23 RX ADMIN — HEPARIN SODIUM 5000 UNIT(S): 5000 INJECTION INTRAVENOUS; SUBCUTANEOUS at 05:38

## 2022-09-23 RX ADMIN — Medication 6: at 12:03

## 2022-09-23 RX ADMIN — INSULIN GLARGINE 20 UNIT(S): 100 INJECTION, SOLUTION SUBCUTANEOUS at 08:26

## 2022-09-23 RX ADMIN — Medication 5 UNIT(S): at 08:25

## 2022-09-23 RX ADMIN — Medication 10 MILLIGRAM(S): at 05:38

## 2022-09-23 RX ADMIN — Medication 3: at 08:25

## 2022-09-23 RX ADMIN — Medication 81 MILLIGRAM(S): at 12:07

## 2022-09-23 RX ADMIN — PIPERACILLIN AND TAZOBACTAM 200 GRAM(S): 4; .5 INJECTION, POWDER, LYOPHILIZED, FOR SOLUTION INTRAVENOUS at 15:40

## 2022-09-23 RX ADMIN — ATORVASTATIN CALCIUM 40 MILLIGRAM(S): 80 TABLET, FILM COATED ORAL at 21:37

## 2022-09-23 RX ADMIN — CEFEPIME 100 MILLIGRAM(S): 1 INJECTION, POWDER, FOR SOLUTION INTRAMUSCULAR; INTRAVENOUS at 05:38

## 2022-09-23 RX ADMIN — PIPERACILLIN AND TAZOBACTAM 25 GRAM(S): 4; .5 INJECTION, POWDER, LYOPHILIZED, FOR SOLUTION INTRAVENOUS at 21:37

## 2022-09-23 RX ADMIN — INSULIN GLARGINE 4 UNIT(S): 100 INJECTION, SOLUTION SUBCUTANEOUS at 12:22

## 2022-09-23 RX ADMIN — CEFEPIME 100 MILLIGRAM(S): 1 INJECTION, POWDER, FOR SOLUTION INTRAMUSCULAR; INTRAVENOUS at 13:21

## 2022-09-23 RX ADMIN — HEPARIN SODIUM 5000 UNIT(S): 5000 INJECTION INTRAVENOUS; SUBCUTANEOUS at 13:01

## 2022-09-23 RX ADMIN — Medication 250 MILLIGRAM(S): at 08:39

## 2022-09-23 RX ADMIN — Medication 6 UNIT(S): at 12:03

## 2022-09-23 NOTE — CHART NOTE - NSCHARTNOTEFT_GEN_A_CORE
Endocrine Supplemental Note    Patient has fasting and postprandial hyperglycemia likely due to baseline insulin resistance in setting of underlying infection. Also receiving IV antibiotics mixed with dextrose solutions.  Will further titrate the insulin doses as below    Basal Insulin:   Increase Glargine ( Lantus) to 24 units from tomorrow morning. Give 4 units of Lantus x1 dose today as patient received the Lantus 20 units in the morning today.     Nutritional Insulin:  Increase Lispro ( Admelog) to 6 units with meals, Hold if NPO or eating <50% of meals    Correctional Insulin:  Change the corrective scale to moderate Lispro ( Admelog) correctional scale with meals and bedtime    Oral Diabetes Medications:  None in the hospital

## 2022-09-23 NOTE — PROGRESS NOTE ADULT - ASSESSMENT
Subjective: NAD, afebrile, feeling better, has left foot pain with purulent discharge. No N/V or diarrhea, reports constipation, no cough or SOB, no chest pain or palpitations.    REVIEW OF SYSTEMS:  CONSTITUTIONAL:  No weakness, fevers or chills  EYES/ENT:  No visual changes;  No vertigo or throat pain   NECK: No pain or stiffness  RESPIRATORY:  No cough, wheezing, hemoptysis; No shortness of breath  CARDIOVASCULAR:  No chest pain or palpitations  GASTROINTESTINAL:  No abdominal or epigastric pain. No nausea, vomiting, or hematemesis; No diarrhea or constipation. No melena or hematochezia.  GENITOURINARY:  No dysuria, frequency or hematuria  NEUROLOGICAL:  No numbness or weakness  MSK: no back pain, left foot pain, left foot wound with purulent diacharge  SKIN:  No itching, rashes    PE:  Vital Signs Last 24 Hrs  T(C): 37.3 (23 Sep 2022 13:34), Max: 37.3 (23 Sep 2022 13:34)  T(F): 99.1 (23 Sep 2022 13:34), Max: 99.1 (23 Sep 2022 13:34)  HR: 99 (23 Sep 2022 13:34) (83 - 99)  BP: 151/92 (23 Sep 2022 13:34) (149/86 - 151/92)  RR: 17 (23 Sep 2022 13:34) (17 - 18)  SpO2: 98% (23 Sep 2022 13:34) (97% - 98%)    Parameters below as of 23 Sep 2022 13:34  Patient On (Oxygen Delivery Method): room air      Gen: AOx3, NAD, non-toxic, pleasant  HEAD:  Atraumatic, Normocephalic  EYES: EOMI, PERRLA, conjunctiva and sclera clear  ENT: Moist mucous membranes  NECK: Supple, No JVD  CV: S1+S2 normal, nontachycardic  Resp: Clear bilat, no resp distress, no crackles/wheezes  Abd: Soft, nontender, +BS  Ext: pulses weak  : No Silverio  IV/Skin: No thrombophlebitis  Msk: No low back pain, left foot sole wound with purulent discharge, erythema and edema improved.  Neuro: AAOx3. No sensory deficits, no motor deficits    LABS:                        10.3   8.83  )-----------( 280      ( 23 Sep 2022 05:08 )             32.9     09-23    136  |  100  |  10  ----------------------------<  239<H>  4.0   |  27  |  0.73    Ca    9.1      23 Sep 2022 05:08  Phos  3.2     09-22  Mg     1.7     09-22    TPro  7.8  /  Alb  2.1<L>  /  TBili  0.5  /  DBili  x   /  AST  10  /  ALT  12  /  AlkPhos  67  09-22    MICROBIOLOGY:  Vancomycin Level, Trough: 5.7 ug/mL (09-23-22 @ 07:18)    .Surgical Swab L. foot wound  09-22-22   Few Gram Negative Rods  Few Streptococcus agalactiae (Group B) isolated  Group B streptococci are susceptible to ampicillin,  penicillin and cefazolin, but may be resistant to  erythromycin and clindamycin.  Recommendations for intrapartum prophylaxis for Group B  streptococci are penicillin or ampicillin.  Normal skin elmer isolated  --  --    .Blood Blood-Peripheral  09-21-22   No growth to date.  --  --    .Blood Blood-Peripheral  09-21-22   No growth to date.  --  --    ANTIBIOTICS:  cefepime   IVPB 1000 every 12 hours (9/21- )  vancomycin  IVPB 1750 every 12 hours(9/21-9/23 )    RADIOLOGY:  < from: MR Foot No Cont, Left (09.23.22 @ 11:28) >  IMPRESSION: Wound along the plantar aspect of the foot at the level of the second MPJ. This communicates with a multiloculated collection that is present in the first metatarsal space and dorsal soft tissues overlying the second digit, consistent with an abscess. There is also a smaller collection in the dorsal soft tissues overlying the third digit. Areas of marrow signal alteration within the second digit metatarsal, second digit proximal phalanx, the third digit proximal phalanx, the head of the third metatarsal, and the head of the first metatarsal. Differential diagnosis for the signal alteration at these sites includes osteomyelitis and stress reaction.    IMPRESSION:  40 yo male from [facility] with hx of DM (last A1c >10), HTN, HLD who presented for infected left foot sole ulcer previously I&D at [facility] last month and tx with IV abx for ~ 2-3 weeks total(Unasyn).  C-Reactive Protein, Serum: 88 mg/L (09-21-22 @ 14:20)    -DFI (SSTI) vs DFO after puncture trauma to L foot sole with wound cx growing GBS and GNR. MRI L foot w/ multiloculated collection that is present in the first metatarsal space and dorsal soft tissues overlying the second digit, consistent with an abscess  -Uncontrolled DM  -Small vessel disease/ calcified vessels L foot(ADRYAN/PVR)    PLAN:  Please d/c vancomycin  change Cefepime to Zosyn to add anaerobe coverage 3.375 g q/8 hrs IV (4hrs infusion)  Trend ESR and CRP every 2-3 days  Wound care as per podiatry  Planned for I&D in the OR on 9/26, please obtain Gram stain and bacterial cultures, tissue cultures, path(bone margins)  Discussed with medicine attending    Please reach ID with any questions or concerns  Dr. Natalie Wilhelm  Tel. [phone]  Available in Teams

## 2022-09-23 NOTE — PROGRESS NOTE ADULT - PROBLEM SELECTOR PLAN 2
- Med rec completed 9/22/22 spoke w/ PharmacistCricket  - A1C=12.3  - FS elevated   - C/w HSS, Lantus and Admelog coverage   - Endocrinologist-Dr. Marr consulted, appreciated     - Med rec completed 9/22/22 spoke w/ Pharmacist-Ching

## 2022-09-23 NOTE — PROGRESS NOTE ADULT - ASSESSMENT
41 year old male with past medical history of DM (last A1c >10), HTN, HLD.   Presented with a left foot wound. The left foot wound initially started 6 weeks ago when the patient had a screw puncture his sole of his slipper and then suffered a wound on the bottom of the left foot. Patient stated he then when to [facility] and was admitted for 7 days in which he received Unasyn and then was later discharged with a PICC line for additional 2-3 weeks of IV Unasyn.  Patient received home nursing wound care.  However, patient started to develop blistering of the left foot and enlargement of the wound on the sole of his foot.  Additionally, a wound developed between the left great toe and 2nd toe.  Patient reported purulent discharge and pain.  Patient reported the pain was in the left foot, rated 10/10 burning, constant and worsened with ambulation. Patient reported fevers and chills. Patient also reported numbness in the 2nd toe on the left side and tingling.  Admitted for Left Foot Open Wound and to rule out OM.

## 2022-09-23 NOTE — PROGRESS NOTE ADULT - SUBJECTIVE AND OBJECTIVE BOX
NP Note discussed with  primary attending    Patient is a 41y old  Male who presents with a chief complaint of Left foot wound r/o Osteomyelitis (23 Sep 2022 11:44)      INTERVAL HPI/OVERNIGHT EVENTS: Pt reports less ankle and calf pain, not described as "discomfort."  Denies left foot pain at this time but reports intermittent throbbing pain.  No new complaints or concerns.      MEDICATIONS  (STANDING):  aspirin  chewable 81 milliGRAM(s) Oral daily  atorvastatin 40 milliGRAM(s) Oral at bedtime  cefepime   IVPB      enalapril 10 milliGRAM(s) Oral daily  glucagon  Injectable 1 milliGRAM(s) IntraMuscular once  heparin   Injectable 5000 Unit(s) SubCutaneous every 8 hours  influenza   Vaccine 0.5 milliLiter(s) IntraMuscular once  insulin lispro (ADMELOG) corrective regimen sliding scale   SubCutaneous three times a day before meals  insulin lispro (ADMELOG) corrective regimen sliding scale   SubCutaneous at bedtime  insulin lispro Injectable (ADMELOG) 6 Unit(s) SubCutaneous three times a day before meals  vancomycin  IVPB 1750 milliGRAM(s) IV Intermittent every 12 hours    MEDICATIONS  (PRN):  acetaminophen     Tablet .. 650 milliGRAM(s) Oral every 6 hours PRN Temp greater or equal to 38C (100.4F), Mild Pain (1 - 3)      __________________________________________________  REVIEW OF SYSTEMS:    CONSTITUTIONAL: No fever  EYES: No acute visual disturbances  NECK: No pain or stiffness  RESPIRATORY: No cough; No shortness of breath  CARDIOVASCULAR: No chest pain, no palpitations  GASTROINTESTINAL: No pain. No nausea or vomiting.  No diarrhea   NEUROLOGICAL: No headache or numbness, no tremors  MUSCULOSKELETAL: Intermittent throbbing pain in left lower extremity, no muscle pain  GENITOURINARY: No dysuria, no frequency, no hesitancy  PSYCHIATRY: No depression , no anxiety  ALL OTHER  ROS negative      Vital Signs Last 24 Hrs  T(C): 36.9 (23 Sep 2022 04:51), Max: 37.6 (22 Sep 2022 13:38)  T(F): 98.5 (23 Sep 2022 04:51), Max: 99.7 (22 Sep 2022 13:38)  HR: 88 (23 Sep 2022 04:51) (83 - 99)  BP: 149/86 (23 Sep 2022 04:51) (140/95 - 149/94)  RR: 18 (23 Sep 2022 04:51) (18 - 18)  SpO2: 98% (23 Sep 2022 04:51) (97% - 98%)    Parameters below as of 23 Sep 2022 04:51  Patient On (Oxygen Delivery Method): room air        ________________________________________________  PHYSICAL EXAM:  GENERAL: NAD  HEENT: Normocephalic;  conjunctivae and sclerae clear; moist mucous membranes;   NECK : Supple  CHEST/LUNG: Clear to auscultation bilaterally with good air entry   HEART: S1 S2  regular; no murmurs, gallops or rubs  ABDOMEN: Soft, Nontender, Nondistended; Bowel sounds present x 4 quad  EXTREMITIES: No cyanosis; no edema; no calf tenderness.  (+) Left foot plantar wound with packing and serous drainage noted.  Mild plantar edema noted.  Cole dsg covering plantar wound.    SKIN: Warm and dry; no rash  NERVOUS SYSTEM:  Awake and alert; Oriented  to place, person and time ; no new deficits  _________________________________________________  LABS:                        10.3   8.83  )-----------( 280      ( 23 Sep 2022 05:08 )             32.9     09-23    136  |  100  |  10  ----------------------------<  239<H>  4.0   |  27  |  0.73    Ca    9.1      23 Sep 2022 05:08  Phos  3.2     09-22  Mg     1.7     09-22    TPro  7.8  /  Alb  2.1<L>  /  TBili  0.5  /  DBili  x   /  AST  10  /  ALT  12  /  AlkPhos  67  09-22    PT/INR - ( 22 Sep 2022 07:05 )   PT: 13.9 sec;   INR: 1.17 ratio         PTT - ( 22 Sep 2022 07:05 )  PTT:31.2 sec    CAPILLARY BLOOD GLUCOSE      POCT Blood Glucose.: 295 mg/dL (23 Sep 2022 12:00)  POCT Blood Glucose.: 280 mg/dL (23 Sep 2022 08:03)  POCT Blood Glucose.: 215 mg/dL (22 Sep 2022 21:43)  POCT Blood Glucose.: 316 mg/dL (22 Sep 2022 16:59)        RADIOLOGY & ADDITIONAL TESTS:  < from: MR Foot No Cont, Left (09.23.22 @ 11:28) >  ACC: [id number] EXAM:  MR FOOT LT                          PROCEDURE DATE:  09/23/2022          INTERPRETATION:  EXAMINATION: MRI of the left forefoot without contrast    CLINICAL INFORMATION: Left foot wound. Evaluate for osteomyelitis.    TECHNIQUE: Multiplanar, multisequential MR imaging was performed.    FINDINGS: There is a wound along the plantar aspect of the foot   underlying the second metatarsophalangeal joint with adjacent cellulitic   change. There is high T2 signal throughout the second digit proximal   phalanx and throughout the entirety of the second digit metatarsal   (sparing only its base). There is also high T2 signal throughout the   third digit proximal phalanx and metatarsal head of the third digit as   well as within the medial aspect of the first metatarsal head. The   plantar cutaneous wound communicates with a multiloculated collection in   the first intermetatarsal space which extends slightly laterally and   overlies the second digit metatarsophalangeal joint and second digit   metatarsal, surrounding the second digit extensor tendon. There is also a   smaller correction in the soft tissues overlying the third digit   metatarsal, surrounding the third digit extensor tendon. There is a   moderate-sized second metatarsophalangeal joint effusion. There are small   effusions at the first metatarsal-phalangeal joint and third   metatarsophalangeal joint.    There is nonspecific dorsal subcutaneous soft tissue edema. There is   diffuse fatty atrophy and high T2 signal of musculature, consistent with   denervation. There are mild arthritic changes at the navicular-cuneiform   articulations.    IMPRESSION: Wound along the plantar aspect of the foot at the level of   the second MPJ. This communicates with a multiloculated collection that   is present in the first metatarsal space and dorsal soft tissues   overlying the second digit, consistent with an abscess. There is also a   smaller collection in the dorsal soft tissues overlying the third digit.  Areas of marrow signal alteration within the second digit metatarsal,   second digit proximal phalanx, the third digit proximal phalanx, the head   of the third metatarsal, and the head of the first metatarsal.   Differential diagnosis for the signal alteration at these sites includes   osteomyelitis and stress reaction.    --- End of Report ---            VISHAL JOE MD; Attending Radiologist  This document has been electronically signed. Sep 23 2022 11:43AM    < end of copied text >      Imaging Personally Reviewed:  YES    Consultant(s) Notes Reviewed:   YES    Care Discussed with Consultants :     Plan of care was discussed with patient and /or primary care giver; all questions and concerns were addressed and care was aligned with patient's wishes.

## 2022-09-23 NOTE — DISCHARGE NOTE PROVIDER - NSFOLLOWUPCLINICS_GEN_ALL_ED_FT
[facility]  [address] [place] [address]  Phone: [phone]  Fax: [phone]  Follow Up Time: 1 week

## 2022-09-23 NOTE — DISCHARGE NOTE PROVIDER - CARE PROVIDER_API CALL
Lisette Herndon (DPM)  Podiatric Medicine and Surgery  [address] [place] [address]  Phone: [phone]  Fax: [phone]  Follow Up Time: 1 week    Ivone Jha)  Family Medicine  [address] [place] [address]  Phone: [phone]  Fax: [phone]  Follow Up Time: 1 week    Nazanin Huff)  EndocrinologyMetabDiabetes  [address] [place] [address]  Phone: [phone]  Fax: [phone]  Follow Up Time: 1 week    Jorje Astorga  Internal Medicine  [address] [address]  Phone: ([phone]  Fax: ()-  Follow Up Time: 1 week

## 2022-09-23 NOTE — DISCHARGE NOTE PROVIDER - PROVIDER TOKENS
PROVIDER:[TOKEN:[[id number]],FOLLOWUP:[1 week]],PROVIDER:[TOKEN:[[id number]],FOLLOWUP:[1 week]],PROVIDER:[TOKEN:[[id number]],FOLLOWUP:[1 week]],PROVIDER:[TOKEN:[[id number]],FOLLOWUP:[1 week]]

## 2022-09-23 NOTE — DISCHARGE NOTE PROVIDER - HOSPITAL COURSE
41 year old male with past medical history of DM (last A1c >10), HTN, HLD.   Presented with a left foot wound. The left foot wound initially started 6 weeks ago when the patient had a screw puncture his sole of his slipper and then suffered a wound on the bottom of the left foot. Patient stated he then when to [facility] and was admitted for 7 days in which he received Unasyn and then was later discharged with a PICC line for additional 2-3 weeks of IV Unasyn.  Patient received home nursing wound care.  However, patient started to develop blistering of the left foot and enlargement of the wound on the sole of his foot.  Additionally, a wound developed between the left great toe and 2nd toe.  Patient reported purulent discharge and pain.  Patient reported the pain was in the left foot, rated 10/10 burning, constant and worsened with ambulation. Patient reported fevers and chills. Patient also reported numbness in the 2nd toe on the left side and tingling.  Admitted for Left Foot Open Wound and to rule out OM.    THIS IS A BRIEF SUMMARY.  FOR A FULL HOSPITAL COURSE SEE CHART        incomplete a/o 9/23     41 year old male with past medical history of DM (last A1c >10), HTN, HLD who p/w left foot wound. Of note was admitted for 7 days at [facility] in which he received Unasyn and then was later discharged with a PICC line for additional 2-3 weeks of IV Unasyn, but wound started to have purulent discharge andreported fevers and chills.  Admitted to medicine for Left diabetic foot ulcer,  MRI confirmed osteomyelitis. RD Stratton followed started on IV Zosyn.   Vascular followed and found No acute vascular intervention required at this time.   s/p left foot wound debridement on 09/26 with podiatry, reccomended to continue IV Zosyn x6 weeks + wound vacc. PICC line placed 9/29, pending home care services NCM to follow         incomplete a/o 9/23     41 year old male with past medical history of DM (last A1c >10), HTN, HLD who p/w left foot wound. Of note was admitted for 7 days at [facility] in which he received Unasyn and then was later discharged with a PICC line for additional 2-3 weeks of IV Unasyn, but wound started to have purulent discharge andreported fevers and chills.  Admitted to medicine for Left diabetic foot ulcer,  MRI confirmed osteomyelitis. RD Stratton followed started on IV Zosyn.  Vascular followed and found No acute vascular intervention required at this time. Hospital course complicated by uncontrolled blood sugars insulin increased and letty Marr followed   s/p left foot wound debridement on 09/26 with podiatry, reccomended to continue IV Zosyn x6 weeks + wound vacc. PICC line placed 9/29, plan to D/C home with home care services IV infusion and wound vacc  Discharge discussed with attending   This is only a brief summary of patient's hospital stay, for full course please see EMR 41 year old male with past medical history of DM (last A1c >10), HTN, HLD who p/w left foot wound. Of note was admitted for 7 days at [facility] in which he received Unasyn and then was later discharged with a PICC line for additional 2-3 weeks of IV Unasyn, but wound started to have purulent discharge and reported fevers and chills.  Admitted to medicine for Left diabetic foot ulcer,  MRI confirmed osteomyelitis. RD Stratton followed started on IV Zosyn.  Vascular followed and found No acute vascular intervention required at this time. Hospital course complicated by uncontrolled blood sugars insulin increased and letty Marr followed   s/p left foot wound debridement on 09/26 with podiatry, reccomended to continue IV Zosyn x6 weeks + wound vacc. PICC line placed 9/29, plan to D/C home with home care services IV infusion and wound vacc  Discharge discussed with attending   This is only a brief summary of patient's hospital stay, for full course please see EMR

## 2022-09-23 NOTE — DISCHARGE NOTE PROVIDER - CARE PROVIDERS DIRECT ADDRESSES
,DirectAddress_Unknown,DirectAddress_Unknown,DirectAddress_Unknown,[email or web address]

## 2022-09-23 NOTE — DISCHARGE NOTE PROVIDER - NSDCCAREPROVSEEN_GEN_ALL_CORE_FT
Morgan, Lisette Cali, Kinsey Whittaker, Natalie Marr, Carisa Franklin, Robles Saez, Pranav Kline, Nicki

## 2022-09-23 NOTE — PROGRESS NOTE ADULT - PROBLEM SELECTOR PLAN 1
- P/w open wound on the sole of the left foot with positive probe to bone, diminished sensation and decreased pulses  - Afebrile w/ leukocytosis of 11k  - S/p Tetanus  - C/w Vancomycin and Cefepime.  Vancomycin trough subtherapeutic.  Plan to repeat trough prior to next dose at 6PM.  As discussed w/ ID.  - Blood cx negative  - Left foot MR c/w OM.  Podiatry plans to take pt to OR on Mon, 9/26 @ 2:30P for I & D with possible wound vac.    - Sx wound cx pending-f/u results   - Obtain records from [facility] for sensitivities and pathogens  - Podiatrist-Dr. Mora consulted, appreciated  - ID-Dr. Stratton consulted, appreciated - P/w open wound on the sole of the left foot with positive probe to bone, diminished sensation and decreased pulses  - Continue NWB to left foot  - Afebrile w/ leukocytosis of 11k  - S/p Tetanus  - C/w Vancomycin and Cefepime.  Vancomycin trough subtherapeutic.  Plan to repeat trough prior to next dose at 6PM.  As discussed w/ ID.  - Blood cx negative  - Left foot MR c/w OM.  Podiatry plans to take pt to OR on Mon, 9/26 @ 2:30P for I & D with possible wound vac.    - Sx wound cx pending-f/u results   - Obtain records from [facility] for sensitivities and pathogens  - Podiatrist-Dr. Mora consulted, appreciated  - ID-Dr. Stratton consulted, appreciated

## 2022-09-23 NOTE — DISCHARGE NOTE PROVIDER - NSDCPNSUBOBJ_GEN_ALL_CORE
Patient feels well and wants to go  home today. ATBx infusion arranged at home. Metformin added per endo recs  stable to dc home   Patient feels well and wants to go  home today. ATBx infusion arranged at home. Metformin added per endo recs  stable to dc home      Patient was not able to go home on 10/3 as home care was still pending- today everything is in place and he was dced home

## 2022-09-23 NOTE — DISCHARGE NOTE PROVIDER - NSDCCPCAREPLAN_GEN_ALL_CORE_FT
PRINCIPAL DISCHARGE DIAGNOSIS  Diagnosis: Acute osteomyelitis  Assessment and Plan of Treatment:       SECONDARY DISCHARGE DIAGNOSES  Diagnosis: Diabetic foot ulcer  Assessment and Plan of Treatment:     Diagnosis: DM (diabetes mellitus)  Assessment and Plan of Treatment:     Diagnosis: HTN (hypertension)  Assessment and Plan of Treatment:      PRINCIPAL DISCHARGE DIAGNOSIS  Diagnosis: Acute osteomyelitis  Assessment and Plan of Treatment: Osteomyelitis is a severe bone infection. It can develop in any bone, but often involves the long bones, such as your arm and leg bones. You were followed  by the infectious disease specialist [facility] podiatry team. This was treated with intravenous antibiotics, and you underwent a debridement of the wound on 9/26. You will continue antibiotics until 11/7 via PICC line and wound vacc. Wound vac must be changed Monday, Wednesday, Friday. Wound vac to be used continuously black foam to plantar wound, bridged dorsally through first interspace with dhiraj pad on dorsum of foot. (no black foam in interspace wound. Cover with Tegaderm; DSD and Ace over vac; vac pressure continuous at 126 mmHg  You must follow up with the podiatry team within in 1 week at [address] [facility], [address] phone [phone] w/ Dr. Serrano      SECONDARY DISCHARGE DIAGNOSES  Diagnosis: Uncontrolled diabetes mellitus with hyperglycemia  Assessment and Plan of Treatment: Your HgA1C was 12.3 this admission.  Make sure you get your HgA1c checked every three months.  If you take oral diabetes medications, check your blood glucose two times a day.  If you take insulin, check your blood glucose before meals and at bedtime.  It's important not to skip any meals.  Keep a log of your blood glucose results and always take it with you to your doctor appointments.  Keep a list of your current medications including injectables and over the counter medications and bring this medication list with you to all your doctor appointments.  If you have not seen your ophthalmologist this year call for appointment.  Check your feet daily for redness, sores, or openings. Do not self treat. If no improvement in two days call your primary care physician for an appointment.    Diagnosis: PAD (peripheral artery disease)  Assessment and Plan of Treatment: Peripheral artery disease (PAD) is narrow, weak, or blocked arteries. It may affect any arteries outside of your heart and brain. PAD is usually the result of a buildup of fat and cholesterol, also called plaque, along your artery walls. Inflammation, a blood clot, or abnormal cell growth could also block your arteries. PAD prevents normal blood flow to your legs and arms. You are at risk of an amputation if poor blood flow keeps wounds from healing or causes gangrene (tissue death). Without treatment, PAD can also cause a heart attack or stroke. This was confirmed in your lower extremities via doppers. You were followed by the vascular surgery team who reccomended outpatient follow up    Diagnosis: HTN (hypertension)  Assessment and Plan of Treatment: You have a history of high blood pressure, it is important to continue a Low salt diet Activity as tolerated. Take all medication as prescribed.  Follow up with your medical doctor for routine blood pressure monitoring at your next visit. Notify your doctor if you have any of the following symptoms: Dizziness, Lightheadedness, Blurry vision, Headache, Chest pain, Shortness of breath    Diagnosis: Diabetic foot ulcer  Assessment and Plan of Treatment: A diabetic foot ulcer can be redness over a bony area or an open sore. The ulcer can develop anywhere on your foot or toes. Good foot care may help prevent ulcers, or keep them from getting worse. Ask someone to help you if you are not able to check your feet by yourself. You or another person may need to do any of the following:  Keep your blood sugar levels under control. Continue the plan for your diabetes that you and your healthcare provider have discussed. Healthy food choices and taking your medicines as directed may help control blood sugars. Contact your healthcare provider if your blood sugar levels are higher than directed.  Wash your feet each day with soap and warm water. Do not use hot water, because this can injure your foot. Dry your feet gently with a towel after you wash them. Dry between and under your toes.  Apply lotion or a moisturizer on your dry feet. Ask your healthcare provider what lotions are best to use. Do not put lotion or moisturizer between your toes. Moisture between your toes could lead to skin breakdown. Check your feet each day.

## 2022-09-23 NOTE — PROGRESS NOTE ADULT - SUBJECTIVE AND OBJECTIVE BOX
Podiatry Interval: Pt is seen bedside in no acute distress. Patient's dressing was taken down for the MRI exam. Patient is aware that he will need surgical intervention for his left foot. Denies any pain to left foot wound. Denies any overnight acute events. Denies further constitutional symptoms. Denies further pedal complaints.     Podiatry HPI: Pt is a 41M who presents to ED with his girlfriend with a chief complaint of worsening left foot wound. Pt states about 1.5-2 months prior, he was walking in work boots when he stepped on a metal screw. Pt admits he didn't feel it initially due to his neuropathy from diabetes and it wasn't until he noticed blood that he saw a wound. Following this, pt arrived at [facility] where he was admitted for 7 days where they did a bedside I&D and given Unasyn and later discharged on IV picc line with more unasyn. Pt states he previously had a home health nurse changing his foot dressings every other day. Admits he noticed a blistering that worsened to bottom of left foot and that he had a lot of drainage to left 1st interspace with pinpoint wound. Pt states today, he experienced chills and burning pain that worsened when walking. Denies further constitutional symptoms. Denies recent acute trauma. Denies further pedal complaints.     Patient is a 41y old  Male who presents with a chief complaint of Left foot wound r/o Osteomyelitis (21 Sep 2022 18:46)      HPI:  Patient is a 42 yo male with hx of DM (last A1c >10), HTN, HLD presents with a left foot wound. The left foot wound initially started 6 weeks ago when the patient had a screw puncture his sole of his slipper and then suffered a wound on the bottom of the left foot. Patient stated he then when to [facility] and was admitted for 7 days in which he received Unasyn and then was later discharged with PICC line for additional 2-3 weeks of IV unasyn. Patient had wound care nurse come to his home, however started to develop blistering of the left foot and enlargement of the wound on the sole of his foot. Wounds also developed between the left great toe and 2nd toe. Patient reports purulent discharge and pain. Pain is in the left foot, 10/10 burning, constant and worsened with ambulation. Patient reports fevers and chills. Patient also states he has numbness in the 2nd toe on the left side and tingling. Patient denies chest pain, sob or palpitations. Denies syncope, dizziness.     Pharmacy was called multiple times, no answer. Please confirm Insulin dosages.  (21 Sep 2022 18:46)    Patient admits to  (-) Fevers, (-) Chills, (-) Nausea, (-) Vomiting, (-) Shortness of Breath (-) calf pain (-) chest pain     Medications acetaminophen     Tablet .. 650 milliGRAM(s) Oral every 6 hours PRN  aspirin  chewable 81 milliGRAM(s) Oral daily  atorvastatin 40 milliGRAM(s) Oral at bedtime  cefepime   IVPB      cefepime   IVPB 1000 milliGRAM(s) IV Intermittent every 12 hours  enalapril 10 milliGRAM(s) Oral daily  glucagon  Injectable 1 milliGRAM(s) IntraMuscular once  heparin   Injectable 5000 Unit(s) SubCutaneous every 8 hours  influenza   Vaccine 0.5 milliLiter(s) IntraMuscular once  insulin glargine Injectable (LANTUS) 4 Unit(s) SubCutaneous once  insulin lispro (ADMELOG) corrective regimen sliding scale   SubCutaneous three times a day before meals  insulin lispro (ADMELOG) corrective regimen sliding scale   SubCutaneous at bedtime  insulin lispro Injectable (ADMELOG) 6 Unit(s) SubCutaneous three times a day before meals  vancomycin  IVPB 1750 milliGRAM(s) IV Intermittent every 12 hours    FHDiabetes mellitus, type 2 (Mother)    ,   PMHSeasonal allergies    Obesity (BMI 30.0-34.9)    Hydrocele, bilateral    HLD (hyperlipidemia)    Hypertension    Diabetes mellitus, type 2    PVD (peripheral vascular disease)    GERD (gastroesophageal reflux disease)    Vitamin D deficiency       PSHNo significant past surgical history        Labs                          10.3   8.83  )-----------( 280      ( 23 Sep 2022 05:08 )             32.9      09-23    136  |  100  |  10  ----------------------------<  239<H>  4.0   |  27  |  0.73    Ca    9.1      23 Sep 2022 05:08  Phos  3.2     09-22  Mg     1.7     09-22    TPro  7.8  /  Alb  2.1<L>  /  TBili  0.5  /  DBili  x   /  AST  10  /  ALT  12  /  AlkPhos  67  09-22     Vital Signs Last 24 Hrs  T(C): 36.9 (23 Sep 2022 04:51), Max: 37.6 (22 Sep 2022 13:38)  T(F): 98.5 (23 Sep 2022 04:51), Max: 99.7 (22 Sep 2022 13:38)  HR: 88 (23 Sep 2022 04:51) (83 - 99)  BP: 149/86 (23 Sep 2022 04:51) (140/95 - 149/94)  BP(mean): --  RR: 18 (23 Sep 2022 04:51) (18 - 18)  SpO2: 98% (23 Sep 2022 04:51) (97% - 98%)    Parameters below as of 23 Sep 2022 04:51  Patient On (Oxygen Delivery Method): room air      Sedimentation Rate, Erythrocyte: 96 mm/Hr (09-21-22 @ 14:20)         C-Reactive Protein, Serum: 88 mg/L (09-21-22 @ 14:20)   WBC Count: 8.83 K/uL (09-23-22 @ 05:08)        PHYSICAL EXAM  LE Focused:  Pt is A&Ox3 in no acute distress  Vasc: DP/PT pulses non palpable due to edema b/l ; CFT < 3 seconds to hallux on left; edema and erythema noted to dorsal foot. TG: warm to warm. Pedal hair absent. Varicosities absent  Derm: Left 1st interspace maceration noted with small wound that tunneled to plantar wound measuring .2 x .2cm. Left plantar foot wound sub-2nd and 3rd metatarsal heads extending plantar with hyperkeratotic edges and fibrous wound base. Wounds drain serous drainage today  Neuro: Protective sensation absent b/l; light touch sensation diminished b/l   MSK: No POP to left foot wounds       < from: Xray Foot AP + Lateral + Oblique, Left (09.21.22 @ 15:35) >  ACC: [id number] EXAM:  XR FOOT COMP MIN 3 VIEWS LT                          PROCEDURE DATE:  09/21/2022          INTERPRETATION:  LEFT foot    CLINICAL INFORMATION: Infection..    TECHNIQUE: AP,lateral and oblique views.    FINDINGS:  A plantar ulceration with soft tissue swelling of the ball of foot.    The bones and joint spaces are intact.  No fracture or dislocation.  No radiographic evidence of osteomyelitis.    IMPRESSION:    Plantar soft tissue ulceration with soft tissue swelling.  No radiographic evidence of osteomyelitis.  If osteomyelitis is considered  despite conservative therapy, and soft   tissue / bone infection requires further assessment, follow-up MRI   recommended.    --- End of Report ---    < end of copied text >    CULTURES:     A:  - DM uncontrolled  - Left plantar foot wound; Left 1st interspace wound         P:   Patient evaluated and Chart reviewed   Discussed diagnosis and treatment with patient  Wound flushed with betadine/saline flush   Culture pending   Applied iodoform packing to left plantar foot and 1st interspace with DSD, ACE  X-rays evaluated and reviewed   Continue with IV antibiotics As Per ID recc  Request medical optimization for left foot debridement on 9/26 at 2:30pm   ADRYAN/PVR reviewed  NWB to left foot  Discussed importance of daily foot examinations and proper shoe gear and to importance of lower Fasting Blood Glucose levels.   Podiatry to follow while in house.  Discussed with Attending Dr. Serrano    Podiatry Interval: Pt is seen bedside in no acute distress. Patient's dressing was taken down for the MRI exam. Patient is aware that he will need surgical intervention for his left foot. Denies any pain to left foot wound. Denies any overnight acute events. Denies further constitutional symptoms. Denies further pedal complaints.     Podiatry HPI: Pt is a 41M who presents to ED with his girlfriend with a chief complaint of worsening left foot wound. Pt states about 1.5-2 months prior, he was walking in work boots when he stepped on a metal screw. Pt admits he didn't feel it initially due to his neuropathy from diabetes and it wasn't until he noticed blood that he saw a wound. Following this, pt arrived at [facility] where he was admitted for 7 days where they did a bedside I&D and given Unasyn and later discharged on IV picc line with more unasyn. Pt states he previously had a home health nurse changing his foot dressings every other day. Admits he noticed a blistering that worsened to bottom of left foot and that he had a lot of drainage to left 1st interspace with pinpoint wound. Pt states today, he experienced chills and burning pain that worsened when walking. Denies further constitutional symptoms. Denies recent acute trauma. Denies further pedal complaints.     Patient is a 41y old  Male who presents with a chief complaint of Left foot wound r/o Osteomyelitis (21 Sep 2022 18:46)      HPI:  Patient is a 40 yo male with hx of DM (last A1c >10), HTN, HLD presents with a left foot wound. The left foot wound initially started 6 weeks ago when the patient had a screw puncture his sole of his slipper and then suffered a wound on the bottom of the left foot. Patient stated he then when to [facility] and was admitted for 7 days in which he received Unasyn and then was later discharged with PICC line for additional 2-3 weeks of IV unasyn. Patient had wound care nurse come to his home, however started to develop blistering of the left foot and enlargement of the wound on the sole of his foot. Wounds also developed between the left great toe and 2nd toe. Patient reports purulent discharge and pain. Pain is in the left foot, 10/10 burning, constant and worsened with ambulation. Patient reports fevers and chills. Patient also states he has numbness in the 2nd toe on the left side and tingling. Patient denies chest pain, sob or palpitations. Denies syncope, dizziness.     Pharmacy was called multiple times, no answer. Please confirm Insulin dosages.  (21 Sep 2022 18:46)    Patient admits to  (-) Fevers, (-) Chills, (-) Nausea, (-) Vomiting, (-) Shortness of Breath (-) calf pain (-) chest pain     Medications acetaminophen     Tablet .. 650 milliGRAM(s) Oral every 6 hours PRN  aspirin  chewable 81 milliGRAM(s) Oral daily  atorvastatin 40 milliGRAM(s) Oral at bedtime  cefepime   IVPB      cefepime   IVPB 1000 milliGRAM(s) IV Intermittent every 12 hours  enalapril 10 milliGRAM(s) Oral daily  glucagon  Injectable 1 milliGRAM(s) IntraMuscular once  heparin   Injectable 5000 Unit(s) SubCutaneous every 8 hours  influenza   Vaccine 0.5 milliLiter(s) IntraMuscular once  insulin glargine Injectable (LANTUS) 4 Unit(s) SubCutaneous once  insulin lispro (ADMELOG) corrective regimen sliding scale   SubCutaneous three times a day before meals  insulin lispro (ADMELOG) corrective regimen sliding scale   SubCutaneous at bedtime  insulin lispro Injectable (ADMELOG) 6 Unit(s) SubCutaneous three times a day before meals  vancomycin  IVPB 1750 milliGRAM(s) IV Intermittent every 12 hours    FHDiabetes mellitus, type 2 (Mother)    ,   PMHSeasonal allergies    Obesity (BMI 30.0-34.9)    Hydrocele, bilateral    HLD (hyperlipidemia)    Hypertension    Diabetes mellitus, type 2    PVD (peripheral vascular disease)    GERD (gastroesophageal reflux disease)    Vitamin D deficiency       PSHNo significant past surgical history        Labs                          10.3   8.83  )-----------( 280      ( 23 Sep 2022 05:08 )             32.9      09-23    136  |  100  |  10  ----------------------------<  239<H>  4.0   |  27  |  0.73    Ca    9.1      23 Sep 2022 05:08  Phos  3.2     09-22  Mg     1.7     09-22    TPro  7.8  /  Alb  2.1<L>  /  TBili  0.5  /  DBili  x   /  AST  10  /  ALT  12  /  AlkPhos  67  09-22     Vital Signs Last 24 Hrs  T(C): 36.9 (23 Sep 2022 04:51), Max: 37.6 (22 Sep 2022 13:38)  T(F): 98.5 (23 Sep 2022 04:51), Max: 99.7 (22 Sep 2022 13:38)  HR: 88 (23 Sep 2022 04:51) (83 - 99)  BP: 149/86 (23 Sep 2022 04:51) (140/95 - 149/94)  BP(mean): --  RR: 18 (23 Sep 2022 04:51) (18 - 18)  SpO2: 98% (23 Sep 2022 04:51) (97% - 98%)    Parameters below as of 23 Sep 2022 04:51  Patient On (Oxygen Delivery Method): room air      Sedimentation Rate, Erythrocyte: 96 mm/Hr (09-21-22 @ 14:20)         C-Reactive Protein, Serum: 88 mg/L (09-21-22 @ 14:20)   WBC Count: 8.83 K/uL (09-23-22 @ 05:08)        PHYSICAL EXAM  LE Focused:  Pt is A&Ox3 in no acute distress  Vasc: DP/PT pulses non palpable due to edema b/l ; CFT < 3 seconds to hallux on left; edema and erythema noted to dorsal foot. TG: warm to warm. Pedal hair absent. Varicosities absent  Derm: Left 1st interspace maceration noted with small wound that tunneled to plantar wound measuring .2 x .2cm. Left plantar foot wound sub-2nd and 3rd metatarsal heads extending plantar with hyperkeratotic edges and fibrous wound base. Wounds drain serous drainage today  Neuro: Protective sensation absent b/l; light touch sensation diminished b/l   MSK: No POP to left foot wounds       < from: Xray Foot AP + Lateral + Oblique, Left (09.21.22 @ 15:35) >  ACC: [id number] EXAM:  XR FOOT COMP MIN 3 VIEWS LT                          PROCEDURE DATE:  09/21/2022          INTERPRETATION:  LEFT foot    CLINICAL INFORMATION: Infection..    TECHNIQUE: AP,lateral and oblique views.    FINDINGS:  A plantar ulceration with soft tissue swelling of the ball of foot.    The bones and joint spaces are intact.  No fracture or dislocation.  No radiographic evidence of osteomyelitis.    IMPRESSION:    Plantar soft tissue ulceration with soft tissue swelling.  No radiographic evidence of osteomyelitis.  If osteomyelitis is considered  despite conservative therapy, and soft   tissue / bone infection requires further assessment, follow-up MRI   recommended.    --- End of Report ---    < end of copied text >    CULTURES:     A:  - DM uncontrolled  - Left plantar foot wound; Left 1st interspace wound         P:   Patient evaluated and Chart reviewed   Discussed diagnosis and treatment with patient  Wound flushed with betadine/saline flush   Culture pending   Applied iodoform packing to left plantar foot and 1st interspace with DSD, ACE   X-rays evaluated and reviewed   Continue with IV antibiotics As Per ID recc  Request medical optimization for left foot debridement on 9/26 at 2:30pm   ADRYAN/PVR reviewed  NWB to left foot  Discussed importance of daily foot examinations and proper shoe gear and to importance of lower Fasting Blood Glucose levels.   Podiatry to follow while in house.  Discussed with Attending Dr. Serrano

## 2022-09-23 NOTE — DISCHARGE NOTE PROVIDER - NSDCMRMEDTOKEN_GEN_ALL_CORE_FT
Admelog SoloStar 100 units/mL injectable solution: 10 unit(s) injectable 3 times a day (before meals)  aspirin 81 mg oral tablet: 1 tab(s) orally once a day  atorvastatin 40 mg oral tablet: 1 tab(s) orally once a day  Basaglar KwikPen 100 units/mL subcutaneous solution: 20 unit(s) subcutaneous once a day (at bedtime)  enalapril 10 mg oral tablet: 1 tab(s) orally once a day  glipiZIDE 10 mg oral tablet: 1 tab(s) orally 2 times a day  Janumet  mg-1000 mg oral tablet, extended release: 1 tab(s) orally once a day (in the evening)   Admelog SoloStar 100 units/mL injectable solution: 10 unit(s) injectable 3 times a day (before meals)  aspirin 81 mg oral tablet: 1 tab(s) orally once a day  atorvastatin 40 mg oral tablet: 1 tab(s) orally once a day  Basaglar KwikPen 100 units/mL subcutaneous solution: 20 unit(s) subcutaneous once a day (at bedtime)  enalapril 10 mg oral tablet: 1 tab(s) orally once a day  flush picc before and after infusions with normal sailine flush:   follow up with Dr Carlos Astorga :   glipiZIDE 10 mg oral tablet: 1 tab(s) orally 2 times a day  Janumet  mg-1000 mg oral tablet, extended release: 1 tab(s) orally once a day (in the evening)  remove PICC after last infusion Nov 7th:   Zosyn 3 g-0.375 g/50 mL intravenous solution: 3 gram(s) intravenously every 8 hours until Nov 7th    Admelog SoloStar 100 units/mL injectable solution: 10 unit(s) injectable 3 times a day (before meals)  aspirin 81 mg oral tablet: 1 tab(s) orally once a day  atorvastatin 40 mg oral tablet: 1 tab(s) orally once a day  Basaglar KwikPen 100 units/mL subcutaneous solution: 20 unit(s) subcutaneous once a day (at bedtime)  enalapril 10 mg oral tablet: 1 tab(s) orally once a day  flush picc before and after infusions with normal sailine flush:   follow up with Dr Carlos Astorga CBC with diff, CMP, ESR and CRP weekly : fax labs to [phone]  glipiZIDE 10 mg oral tablet: 1 tab(s) orally 2 times a day  Janumet  mg-1000 mg oral tablet, extended release: 1 tab(s) orally once a day (in the evening)  remove PICC after last infusion Nov 7th:   Zosyn 3 g-0.375 g/50 mL intravenous solution: 3 gram(s) intravenously every 8 hours until Nov 7th    Admelog SoloStar 100 units/mL injectable solution: 10 unit(s) injectable 3 times a day (before meals)  aspirin 81 mg oral tablet: 1 tab(s) orally once a day  atorvastatin 40 mg oral tablet: 1 tab(s) orally once a day  Basaglar KwikPen 100 units/mL subcutaneous solution: 20 unit(s) subcutaneous once a day (at bedtime)  enalapril 10 mg oral tablet: 1 tab(s) orally once a day  flush picc before and after infusions with normal sailine flush:   follow up with Dr Carlos Astorga CBC with diff, CMP, ESR and CRP weekly : fax labs to [phone]  glipiZIDE 10 mg oral tablet: 1 tab(s) orally 2 times a day  Janumet  mg-1000 mg oral tablet, extended release: 1 tab(s) orally once a day (in the evening)  remove PICC after last infusion Nov 7th:   Zosyn 3 g-0.375 g/50 mL intravenous solution: 3 gram(s) intravenously every 6 hours over 30mins until Nov 7th    Admelog SoloStar 100 units/mL injectable solution: 12 unit(s) injectable 3 times a day   aspirin 81 mg oral tablet: 1 tab(s) orally once a day  atorvastatin 40 mg oral tablet: 1 tab(s) orally once a day  enalapril 10 mg oral tablet: 1 tab(s) orally once a day  flush picc before and after infusions with normal sailine flush: 10 milliliter(s) intravenous 4 times a day   follow up with Dr Carlos Astorga CBC with diff, CMP, ESR and CRP weekly : 1 application intravenous once a week   insulin glargine 100 units/mL subcutaneous solution: 48 unit(s) subcutaneous once a day (at bedtime)   metFORMIN 500 mg oral tablet: 1 tab(s) orally 2 times a day   TAKE WITH BREAKFAST AND DINNER    metFORMIN 500 mg oral tablet, extended release: 1 tab(s) orally once a day   TAKE WITH BREAKFAST  piperacillin-tazobactam: 3 gram(s) intravenous every 6 hours  remove PICC after last infusion Nov 7th: Apply topically to affected area once   senna leaf extract oral tablet: 2 tab(s) orally once a day (at bedtime)  Zosyn 3 g-0.375 g/50 mL intravenous solution: 3 gram(s) intravenously every 6 hours over 30mins until Nov 7th    Admelog SoloStar 100 units/mL injectable solution: 12 unit(s) injectable 3 times a day   aspirin 81 mg oral tablet: 1 tab(s) orally once a day  atorvastatin 40 mg oral tablet: 1 tab(s) orally once a day  enalapril 10 mg oral tablet: 1 tab(s) orally once a day  flush picc before and after infusions with normal sailine flush: 10 milliliter(s) intravenous 4 times a day   follow up with Dr Carlos Astorga CBC with diff, CMP, ESR and CRP weekly : 1 application intravenous once a week   insulin glargine 100 units/mL subcutaneous solution: 48 unit(s) subcutaneous once a day (at bedtime)   metFORMIN 500 mg oral tablet: 1 tab(s) orally 2 times a day   TAKE WITH BREAKFAST AND DINNER    metFORMIN 500 mg oral tablet, extended release: 1 tab(s) orally once a day   TAKE WITH BREAKFAST  piperacillin-tazobactam: 3 gram(s) intravenous every 6 hours  remove PICC after last infusion Nov 7th: Apply topically to affected area once   senna leaf extract oral tablet: 2 tab(s) orally once a day (at bedtime)  Zosyn 3 g-0.375 g/50 mL intravenous solution: 3 gram(s) intravenously every 6 hours over 30mins until Nov 7th

## 2022-09-24 LAB
-  AMIKACIN: SIGNIFICANT CHANGE UP
-  AMOXICILLIN/CLAVULANIC ACID: SIGNIFICANT CHANGE UP
-  AMPICILLIN/SULBACTAM: SIGNIFICANT CHANGE UP
-  AMPICILLIN: SIGNIFICANT CHANGE UP
-  AZTREONAM: SIGNIFICANT CHANGE UP
-  CEFAZOLIN: SIGNIFICANT CHANGE UP
-  CEFEPIME: SIGNIFICANT CHANGE UP
-  CEFOXITIN: SIGNIFICANT CHANGE UP
-  CEFTRIAXONE: SIGNIFICANT CHANGE UP
-  CIPROFLOXACIN: SIGNIFICANT CHANGE UP
-  ERTAPENEM: SIGNIFICANT CHANGE UP
-  GENTAMICIN: SIGNIFICANT CHANGE UP
-  IMIPENEM: SIGNIFICANT CHANGE UP
-  LEVOFLOXACIN: SIGNIFICANT CHANGE UP
-  MEROPENEM: SIGNIFICANT CHANGE UP
-  PIPERACILLIN/TAZOBACTAM: SIGNIFICANT CHANGE UP
-  TOBRAMYCIN: SIGNIFICANT CHANGE UP
-  TRIMETHOPRIM/SULFAMETHOXAZOLE: SIGNIFICANT CHANGE UP
ANION GAP SERPL CALC-SCNC: 11 MMOL/L — SIGNIFICANT CHANGE UP (ref 5–17)
BUN SERPL-MCNC: 12 MG/DL — SIGNIFICANT CHANGE UP (ref 7–18)
CALCIUM SERPL-MCNC: 9.5 MG/DL — SIGNIFICANT CHANGE UP (ref 8.4–10.5)
CHLORIDE SERPL-SCNC: 100 MMOL/L — SIGNIFICANT CHANGE UP (ref 96–108)
CO2 SERPL-SCNC: 24 MMOL/L — SIGNIFICANT CHANGE UP (ref 22–31)
CREAT SERPL-MCNC: 0.71 MG/DL — SIGNIFICANT CHANGE UP (ref 0.5–1.3)
EGFR: 117 ML/MIN/1.73M2 — SIGNIFICANT CHANGE UP
GLUCOSE BLDC GLUCOMTR-MCNC: 201 MG/DL — HIGH (ref 70–99)
GLUCOSE BLDC GLUCOMTR-MCNC: 234 MG/DL — HIGH (ref 70–99)
GLUCOSE BLDC GLUCOMTR-MCNC: 247 MG/DL — HIGH (ref 70–99)
GLUCOSE BLDC GLUCOMTR-MCNC: 251 MG/DL — HIGH (ref 70–99)
GLUCOSE SERPL-MCNC: 237 MG/DL — HIGH (ref 70–99)
HCT VFR BLD CALC: 34.4 % — LOW (ref 39–50)
HGB BLD-MCNC: 10.7 G/DL — LOW (ref 13–17)
MCHC RBC-ENTMCNC: 26 PG — LOW (ref 27–34)
MCHC RBC-ENTMCNC: 31.1 GM/DL — LOW (ref 32–36)
MCV RBC AUTO: 83.5 FL — SIGNIFICANT CHANGE UP (ref 80–100)
METHOD TYPE: SIGNIFICANT CHANGE UP
NRBC # BLD: 0 /100 WBCS — SIGNIFICANT CHANGE UP (ref 0–0)
PLATELET # BLD AUTO: 237 K/UL — SIGNIFICANT CHANGE UP (ref 150–400)
POTASSIUM SERPL-MCNC: 4.2 MMOL/L — SIGNIFICANT CHANGE UP (ref 3.5–5.3)
POTASSIUM SERPL-SCNC: 4.2 MMOL/L — SIGNIFICANT CHANGE UP (ref 3.5–5.3)
RBC # BLD: 4.12 M/UL — LOW (ref 4.2–5.8)
RBC # FLD: 13.6 % — SIGNIFICANT CHANGE UP (ref 10.3–14.5)
SODIUM SERPL-SCNC: 135 MMOL/L — SIGNIFICANT CHANGE UP (ref 135–145)
WBC # BLD: 8.66 K/UL — SIGNIFICANT CHANGE UP (ref 3.8–10.5)
WBC # FLD AUTO: 8.66 K/UL — SIGNIFICANT CHANGE UP (ref 3.8–10.5)

## 2022-09-24 PROCEDURE — 99233 SBSQ HOSP IP/OBS HIGH 50: CPT

## 2022-09-24 RX ORDER — INSULIN GLARGINE 100 [IU]/ML
4 INJECTION, SOLUTION SUBCUTANEOUS ONCE
Refills: 0 | Status: COMPLETED | OUTPATIENT
Start: 2022-09-24 | End: 2022-09-24

## 2022-09-24 RX ORDER — INSULIN GLARGINE 100 [IU]/ML
28 INJECTION, SOLUTION SUBCUTANEOUS EVERY MORNING
Refills: 0 | Status: DISCONTINUED | OUTPATIENT
Start: 2022-09-25 | End: 2022-09-25

## 2022-09-24 RX ORDER — INSULIN LISPRO 100/ML
8 VIAL (ML) SUBCUTANEOUS
Refills: 0 | Status: DISCONTINUED | OUTPATIENT
Start: 2022-09-24 | End: 2022-09-25

## 2022-09-24 RX ADMIN — PIPERACILLIN AND TAZOBACTAM 25 GRAM(S): 4; .5 INJECTION, POWDER, LYOPHILIZED, FOR SOLUTION INTRAVENOUS at 22:00

## 2022-09-24 RX ADMIN — HEPARIN SODIUM 5000 UNIT(S): 5000 INJECTION INTRAVENOUS; SUBCUTANEOUS at 05:19

## 2022-09-24 RX ADMIN — HEPARIN SODIUM 5000 UNIT(S): 5000 INJECTION INTRAVENOUS; SUBCUTANEOUS at 22:00

## 2022-09-24 RX ADMIN — Medication 8 UNIT(S): at 12:25

## 2022-09-24 RX ADMIN — Medication 4: at 17:36

## 2022-09-24 RX ADMIN — HEPARIN SODIUM 5000 UNIT(S): 5000 INJECTION INTRAVENOUS; SUBCUTANEOUS at 13:32

## 2022-09-24 RX ADMIN — Medication 6 UNIT(S): at 08:12

## 2022-09-24 RX ADMIN — ATORVASTATIN CALCIUM 40 MILLIGRAM(S): 80 TABLET, FILM COATED ORAL at 21:58

## 2022-09-24 RX ADMIN — Medication 6: at 12:25

## 2022-09-24 RX ADMIN — INSULIN GLARGINE 24 UNIT(S): 100 INJECTION, SOLUTION SUBCUTANEOUS at 10:22

## 2022-09-24 RX ADMIN — Medication 4: at 08:12

## 2022-09-24 RX ADMIN — PIPERACILLIN AND TAZOBACTAM 25 GRAM(S): 4; .5 INJECTION, POWDER, LYOPHILIZED, FOR SOLUTION INTRAVENOUS at 06:17

## 2022-09-24 RX ADMIN — PIPERACILLIN AND TAZOBACTAM 25 GRAM(S): 4; .5 INJECTION, POWDER, LYOPHILIZED, FOR SOLUTION INTRAVENOUS at 14:54

## 2022-09-24 RX ADMIN — INSULIN GLARGINE 4 UNIT(S): 100 INJECTION, SOLUTION SUBCUTANEOUS at 17:38

## 2022-09-24 RX ADMIN — Medication 10 MILLIGRAM(S): at 05:20

## 2022-09-24 RX ADMIN — Medication 8 UNIT(S): at 17:34

## 2022-09-24 RX ADMIN — Medication 81 MILLIGRAM(S): at 12:26

## 2022-09-24 NOTE — PROGRESS NOTE ADULT - ASSESSMENT
41M PMH DM, HTN, HLD, p/w L foot wound with purulent discharge. Admission for OM.    #L foot OM  #Uncontrolled DM2  #HTN  #HLD    - appreciate ID recs, c/w zosyn  - appreciate podiatry recs - plan for debridement 9/26  - appreciate endocrine recs - c/w lantus 28u qd and lispro tid and ISS  - consistent carb diet  - bp wellcontrolled - c/w ace-i  - c/w statin  - c/w asa  - dvt ppx   41M PMH DM, HTN, HLD, p/w L foot wound with purulent discharge. Admission for OM.    #L foot OM  #cellulitis of L foot  #Uncontrolled DM2  #HTN  #HLD    - appreciate ID recs, c/w zosyn  - appreciate podiatry recs - plan for debridement 9/26  - appreciate endocrine recs - c/w lantus 28u qd and lispro tid and ISS  - consistent carb diet  - bp wellcontrolled - c/w ace-i  - c/w statin  - c/w asa  - dvt ppx

## 2022-09-24 NOTE — DIETITIAN INITIAL EVALUATION ADULT - FACTORS AFF FOOD INTAKE
left foot pain & open wound, osteomyelitis, DM, GERD/difficulty with food procurement/preparation/pain/[place]ism/ethnic/cultural/personal food preferences/other (specify)

## 2022-09-24 NOTE — DIETITIAN INITIAL EVALUATION ADULT - PERSON TAUGHT/METHOD
verbal instruction/written material/individual instruction/pictorial/teach back - (Patient repeats in own words)/ask me 3/patient instructed

## 2022-09-24 NOTE — DIETITIAN INITIAL EVALUATION ADULT - PERTINENT MEDS FT
MEDICATIONS  (STANDING):  aspirin  chewable 81 milliGRAM(s) Oral daily  atorvastatin 40 milliGRAM(s) Oral at bedtime  enalapril 10 milliGRAM(s) Oral daily  glucagon  Injectable 1 milliGRAM(s) IntraMuscular once  heparin   Injectable 5000 Unit(s) SubCutaneous every 8 hours  influenza   Vaccine 0.5 milliLiter(s) IntraMuscular once  insulin lispro (ADMELOG) corrective regimen sliding scale   SubCutaneous three times a day before meals  insulin lispro (ADMELOG) corrective regimen sliding scale   SubCutaneous at bedtime  insulin lispro Injectable (ADMELOG) 8 Unit(s) SubCutaneous three times a day before meals  piperacillin/tazobactam IVPB.. 3.375 Gram(s) IV Intermittent every 8 hours    MEDICATIONS  (PRN):  acetaminophen     Tablet .. 650 milliGRAM(s) Oral every 6 hours PRN Temp greater or equal to 38C (100.4F), Mild Pain (1 - 3)

## 2022-09-24 NOTE — DIETITIAN INITIAL EVALUATION ADULT - SIGNS/SYMPTOMS
Lack of prior exposure to accurate information on diabetes/low cholesterol meal planning w/A1C 12.3%

## 2022-09-24 NOTE — DIETITIAN INITIAL EVALUATION ADULT - NSFNSADHERENCEPTAFT_GEN_A_CORE
Pt. diagnosed with type 2 diabetes mellitus >15 years ago.  At home on Glipizide 10 mg oral tablet, 1 tab(s) orally once a day & Janumet 50 mg-1000 mg oral tablet: 1 tab(s) orally 2 times a day

## 2022-09-24 NOTE — PROGRESS NOTE ADULT - SUBJECTIVE AND OBJECTIVE BOX
Interval of present illness: No acute events overnight. Pt seen at bedside. No new complaints. LLE pain but unchanged from yday.    REVIEW OF SYSTEMS:    CONSTITUTIONAL: No fever  EYES: No acute visual disturbances  NECK: No pain or stiffness  RESPIRATORY: No cough; No shortness of breath  CARDIOVASCULAR: No chest pain, no palpitations  GASTROINTESTINAL: No pain. No nausea or vomiting.  No diarrhea   NEUROLOGICAL: No headache or numbness, no tremors  MUSCULOSKELETAL: LLE foot pain +  GENITOURINARY: No dysuria, no frequency, no hesitancy  PSYCHIATRY: No depression , no anxiety  ALL OTHER  ROS negative     O:  Vital Signs Last 24 Hrs  T(C): 37.1 (24 Sep 2022 13:00), Max: 37.1 (24 Sep 2022 13:00)  T(F): 98.8 (24 Sep 2022 13:00), Max: 98.8 (24 Sep 2022 13:00)  HR: 100 (24 Sep 2022 13:00) (81 - 100)  BP: 148/94 (24 Sep 2022 13:00) (148/94 - 159/89)  BP(mean): --  RR: 16 (24 Sep 2022 13:00) (16 - 19)  SpO2: 98% (24 Sep 2022 13:00) (97% - 100%)    Parameters below as of 24 Sep 2022 13:00  Patient On (Oxygen Delivery Method): room air        Gen: NAD  Neuro: alert, answering qs appropriately, moves all extremities  HEENT: anicteric, moist oral mucosa  Neck: supple, no JVD elevation  Cards: RRR  Pulm: good inspiratory effort, CTAB  Abd: soft, NT/ND, BS+  Ext: L foot bandaged; no edema  Skin: warm, dry      acetaminophen     Tablet .. 650 milliGRAM(s) Oral every 6 hours PRN  aspirin  chewable 81 milliGRAM(s) Oral daily  atorvastatin 40 milliGRAM(s) Oral at bedtime  enalapril 10 milliGRAM(s) Oral daily  glucagon  Injectable 1 milliGRAM(s) IntraMuscular once  heparin   Injectable 5000 Unit(s) SubCutaneous every 8 hours  influenza   Vaccine 0.5 milliLiter(s) IntraMuscular once  insulin lispro (ADMELOG) corrective regimen sliding scale   SubCutaneous three times a day before meals  insulin lispro (ADMELOG) corrective regimen sliding scale   SubCutaneous at bedtime  insulin lispro Injectable (ADMELOG) 8 Unit(s) SubCutaneous three times a day before meals  piperacillin/tazobactam IVPB.. 3.375 Gram(s) IV Intermittent every 8 hours                            10.7   8.66  )-----------( 237      ( 24 Sep 2022 05:45 )             34.4       09-24    135  |  100  |  12  ----------------------------<  237<H>  4.2   |  24  |  0.71    Ca    9.5      24 Sep 2022 05:45

## 2022-09-24 NOTE — CHART NOTE - NSCHARTNOTEFT_GEN_A_CORE
EVENT:  Elevated FS     SUBJECTIVE:  Pt comfortable w/o new complaints or concerns.      OBJECTIVE  REVIEW OF SYSTEMS:    CONSTITUTIONAL: No fever  EYES: No acute visual disturbances  NECK: No pain or stiffness  RESPIRATORY: No cough; No shortness of breath  CARDIOVASCULAR: No chest pain, no palpitations  GASTROINTESTINAL: No pain. No nausea or vomiting.  No diarrhea   NEUROLOGICAL: No headache or numbness, no tremors  MUSCULOSKELETAL: No joint pain, no muscle pain  GENITOURINARY: No dysuria, no frequency, no hesitancy  PSYCHIATRY: No depression , no anxiety  ALL OTHER  ROS negative        Vital Signs Last 24 Hrs  T(C): 37.1 (24 Sep 2022 13:00), Max: 37.1 (24 Sep 2022 13:00)  T(F): 98.8 (24 Sep 2022 13:00), Max: 98.8 (24 Sep 2022 13:00)  HR: 100 (24 Sep 2022 13:00) (81 - 100)  BP: 148/94 (24 Sep 2022 13:00) (148/94 - 159/89)  RR: 16 (24 Sep 2022 13:00) (16 - 19)  SpO2: 98% (24 Sep 2022 13:00) (97% - 100%)    Parameters below as of 24 Sep 2022 13:00  Patient On (Oxygen Delivery Method): room air        LABS:                        10.7   8.66  )-----------( 237      ( 24 Sep 2022 05:45 )             34.4     09-24    135  |  100  |  12  ----------------------------<  237<H>  4.2   |  24  |  0.71    Ca    9.5      24 Sep 2022 05:45        EKG:   IMAGING:    ASSESSMENT:  41 year old male with past medical history of DM (last A1c >10), HTN, HLD.   Presented with a left foot wound. The left foot wound initially started 6 weeks ago when the patient had a screw puncture his sole of his slipper and then suffered a wound on the bottom of the left foot. Patient stated he then when to [facility] and was admitted for 7 days in which he received Unasyn and then was later discharged with a PICC line for additional 2-3 weeks of IV Unasyn.  Patient received home nursing wound care.  However, patient started to develop blistering of the left foot and enlargement of the wound on the sole of his foot.  Additionally, a wound developed between the left great toe and 2nd toe.  Patient reported purulent discharge and pain.  Patient reported the pain was in the left foot, rated 10/10 burning, constant and worsened with ambulation. Patient reported fevers and chills. Patient also reported numbness in the 2nd toe on the left side and tingling.  Admitted for Left Foot Open Wound and to rule out OM.        PLAN:   As discussed w/ Endo, Lantus 4U now x i.  Increased AM Lantus to Lantus 28U a/o 9/25.  Increased standing premeal to Admelog 8U AC TID.  Continue to monitor FS.  Optimizing pt for planned OR 9/26.

## 2022-09-24 NOTE — DIETITIAN INITIAL EVALUATION ADULT - OTHER INFO
Patient from home lives with wife admitted for Left foot wound r/o Osteomyelitis. Visited pt. alert & awake, states usual po intake good, denies nausea/vomiting or diarrhea, complains of ongoing left foot pain with discharge >2wks PTA. Per pt. started making dietary changes to his "eating habits" 1 month ago due to increase cholesterol levels. Adjusted his meals by including lean meats such chicken or fish mostly salmon, increase intake of vegetables & salads, also limiting his intake of rice, potatoes & breads PTA. Pt. reports complaint with medications & daily blood glucose monitoring prior to meals however did not received nutrition education on "diabetes" meal planning. Provided information on "My Plate Planner w/Carbohydrate Counting/Label Reading & Cholesterol Lowering" nutrition therapy, reviewed food lists with menu samples, pt. receptive & encourage to f/up with Endocrinologist/PCP once d/denis to home.  Presently pt. consuming 50% of meals & tolerating, ongoing left foot wound care & pending OR on 09/26/22 for I & D with possible wound vac., Podiatry/team following & Endo/team consulted.

## 2022-09-24 NOTE — DIETITIAN INITIAL EVALUATION ADULT - PERTINENT LABORATORY DATA
09-24    135  |  100  |  12  ----------------------------<  237<H>  4.2   |  24  |  0.71    Ca    9.5      24 Sep 2022 05:45    POCT Blood Glucose.: 251 mg/dL (09-24-22 @ 11:27)  A1C with Estimated Average Glucose Result: 12.3 % (09-22-22 @ 07:05)

## 2022-09-24 NOTE — PROGRESS NOTE ADULT - SUBJECTIVE AND OBJECTIVE BOX
Podiatry Interval: Pt is seen bedside in no acute distress. Patient wanted to review MRI results again, and is aware that he will need surgical intervention for his left foot. Denies any pain to left foot wound. Denies any overnight acute events. Denies further constitutional symptoms. Denies further pedal complaints.     Podiatry HPI: Pt is a 41M who presents to ED with his girlfriend with a chief complaint of worsening left foot wound. Pt states about 1.5-2 months prior, he was walking in work boots when he stepped on a metal screw. Pt admits he didn't feel it initially due to his neuropathy from diabetes and it wasn't until he noticed blood that he saw a wound. Following this, pt arrived at [facility] where he was admitted for 7 days where they did a bedside I&D and given Unasyn and later discharged on IV picc line with more unasyn. Pt states he previously had a home health nurse changing his foot dressings every other day. Admits he noticed a blistering that worsened to bottom of left foot and that he had a lot of drainage to left 1st interspace with pinpoint wound. Pt states today, he experienced chills and burning pain that worsened when walking. Denies further constitutional symptoms. Denies recent acute trauma. Denies further pedal complaints.     Patient is a 41y old  Male who presents with a chief complaint of Left foot wound r/o Osteomyelitis (21 Sep 2022 18:46)      HPI:  Patient is a 40 yo male with hx of DM (last A1c >10), HTN, HLD presents with a left foot wound. The left foot wound initially started 6 weeks ago when the patient had a screw puncture his sole of his slipper and then suffered a wound on the bottom of the left foot. Patient stated he then when to [facility] and was admitted for 7 days in which he received Unasyn and then was later discharged with PICC line for additional 2-3 weeks of IV unasyn. Patient had wound care nurse come to his home, however started to develop blistering of the left foot and enlargement of the wound on the sole of his foot. Wounds also developed between the left great toe and 2nd toe. Patient reports purulent discharge and pain. Pain is in the left foot, 10/10 burning, constant and worsened with ambulation. Patient reports fevers and chills. Patient also states he has numbness in the 2nd toe on the left side and tingling. Patient denies chest pain, sob or palpitations. Denies syncope, dizziness.     Pharmacy was called multiple times, no answer. Please confirm Insulin dosages.  (21 Sep 2022 18:46)    Patient admits to  (-) Fevers, (-) Chills, (-) Nausea, (-) Vomiting, (-) Shortness of Breath (-) calf pain (-) chest pain     Medications acetaminophen     Tablet .. 650 milliGRAM(s) Oral every 6 hours PRN  aspirin  chewable 81 milliGRAM(s) Oral daily  atorvastatin 40 milliGRAM(s) Oral at bedtime  enalapril 10 milliGRAM(s) Oral daily  glucagon  Injectable 1 milliGRAM(s) IntraMuscular once  heparin   Injectable 5000 Unit(s) SubCutaneous every 8 hours  influenza   Vaccine 0.5 milliLiter(s) IntraMuscular once  insulin lispro (ADMELOG) corrective regimen sliding scale   SubCutaneous three times a day before meals  insulin lispro (ADMELOG) corrective regimen sliding scale   SubCutaneous at bedtime  insulin lispro Injectable (ADMELOG) 8 Unit(s) SubCutaneous three times a day before meals  piperacillin/tazobactam IVPB.. 3.375 Gram(s) IV Intermittent every 8 hours    FHDiabetes mellitus, type 2 (Mother)    ,   PMHSeasonal allergies    Obesity (BMI 30.0-34.9)    Hydrocele, bilateral    HLD (hyperlipidemia)    Hypertension    Diabetes mellitus, type 2    PVD (peripheral vascular disease)    GERD (gastroesophageal reflux disease)    Vitamin D deficiency       PSHNo significant past surgical history        Labs                          10.7   8.66  )-----------( 237      ( 24 Sep 2022 05:45 )             34.4      09-24    135  |  100  |  12  ----------------------------<  237<H>  4.2   |  24  |  0.71    Ca    9.5      24 Sep 2022 05:45       Vital Signs Last 24 Hrs  T(C): 37.1 (24 Sep 2022 13:00), Max: 37.1 (24 Sep 2022 13:00)  T(F): 98.8 (24 Sep 2022 13:00), Max: 98.8 (24 Sep 2022 13:00)  HR: 100 (24 Sep 2022 13:00) (81 - 100)  BP: 148/94 (24 Sep 2022 13:00) (148/94 - 159/89)  BP(mean): --  RR: 16 (24 Sep 2022 13:00) (16 - 19)  SpO2: 98% (24 Sep 2022 13:00) (97% - 100%)    Parameters below as of 24 Sep 2022 13:00  Patient On (Oxygen Delivery Method): room air      Sedimentation Rate, Erythrocyte: 96 mm/Hr (09-21-22 @ 14:20)         C-Reactive Protein, Serum: 88 mg/L (09-21-22 @ 14:20)   WBC Count: 8.66 K/uL (09-24-22 @ 05:45)        PHYSICAL EXAM  LE Focused:  Pt is A&Ox3 in no acute distress  Vasc: DP/PT pulses non palpable due to edema b/l ; CFT < 3 seconds to hallux on left; receding erythema and improved swelling to the left foot. TG: warm to warm. Pedal hair absent. Varicosities absent  Derm: Left 1st interspace maceration noted with small wound that tunneled to plantar wound measuring .2 x .2cm. Left plantar foot wound sub-2nd and 3rd metatarsal heads extending plantar with hyperkeratotic edges and fibrous wound base. Trace amount of seropurulent drainage upon evaluation today  Neuro: Protective sensation absent b/l; light touch sensation diminished b/l   MSK: No POP to left foot wounds       < from: Xray Foot AP + Lateral + Oblique, Left (09.21.22 @ 15:35) >  ACC: [id number] EXAM:  XR FOOT COMP MIN 3 VIEWS LT                          PROCEDURE DATE:  09/21/2022          INTERPRETATION:  LEFT foot    CLINICAL INFORMATION: Infection..    TECHNIQUE: AP,lateral and oblique views.    FINDINGS:  A plantar ulceration with soft tissue swelling of the ball of foot.    The bones and joint spaces are intact.  No fracture or dislocation.  No radiographic evidence of osteomyelitis.    IMPRESSION:    Plantar soft tissue ulceration with soft tissue swelling.  No radiographic evidence of osteomyelitis.  If osteomyelitis is considered  despite conservative therapy, and soft   tissue / bone infection requires further assessment, follow-up MRI   recommended.    --- End of Report ---    < end of copied text >    CULTURES:     A:  - DM uncontrolled  - Left plantar foot wound; Left 1st interspace wound         P:   Patient evaluated and Chart reviewed   Discussed diagnosis and treatment with patient  Reviewed MRI results and discussed surgical intervention with patient. Answered all question to patient satisfaction  Reviewed culture results  Wound flushed with betadine/saline flush   Trace amount of seropurulent drainage today  Applied iodoform packing to left plantar foot and 1st interspace with DSD, ACE   Continue with IV antibiotics As Per ID recc  Request medical optimization for left foot debridement on 9/26 at 2:30pm   ADRYAN/PVR reviewed  NWB to left foot  Discussed importance of daily foot examinations and proper shoe gear and to importance of lower Fasting Blood Glucose levels.   Podiatry to follow while in house.  Discussed with Attending Dr. Serrano    Podiatry Interval: Pt is seen bedside in no acute distress. Patient wanted to review MRI results again, and is aware that he will need surgical intervention for his left foot. Denies any pain to left foot wound. Denies any overnight acute events. Denies further constitutional symptoms. Denies further pedal complaints.     Podiatry HPI: Pt is a 41M who presents to ED with his girlfriend with a chief complaint of worsening left foot wound. Pt states about 1.5-2 months prior, he was walking in work boots when he stepped on a metal screw. Pt admits he didn't feel it initially due to his neuropathy from diabetes and it wasn't until he noticed blood that he saw a wound. Following this, pt arrived at [facility] where he was admitted for 7 days where they did a bedside I&D and given Unasyn and later discharged on IV picc line with more unasyn. Pt states he previously had a home health nurse changing his foot dressings every other day. Admits he noticed a blistering that worsened to bottom of left foot and that he had a lot of drainage to left 1st interspace with pinpoint wound. Pt states today, he experienced chills and burning pain that worsened when walking. Denies further constitutional symptoms. Denies recent acute trauma. Denies further pedal complaints.     Patient is a 41y old  Male who presents with a chief complaint of Left foot wound r/o Osteomyelitis (21 Sep 2022 18:46)      HPI:  Patient is a 40 yo male with hx of DM (last A1c >10), HTN, HLD presents with a left foot wound. The left foot wound initially started 6 weeks ago when the patient had a screw puncture his sole of his slipper and then suffered a wound on the bottom of the left foot. Patient stated he then when to [facility] and was admitted for 7 days in which he received Unasyn and then was later discharged with PICC line for additional 2-3 weeks of IV unasyn. Patient had wound care nurse come to his home, however started to develop blistering of the left foot and enlargement of the wound on the sole of his foot. Wounds also developed between the left great toe and 2nd toe. Patient reports purulent discharge and pain. Pain is in the left foot, 10/10 burning, constant and worsened with ambulation. Patient reports fevers and chills. Patient also states he has numbness in the 2nd toe on the left side and tingling. Patient denies chest pain, sob or palpitations. Denies syncope, dizziness.     Pharmacy was called multiple times, no answer. Please confirm Insulin dosages.  (21 Sep 2022 18:46)    Patient admits to  (-) Fevers, (-) Chills, (-) Nausea, (-) Vomiting, (-) Shortness of Breath (-) calf pain (-) chest pain     Medications acetaminophen     Tablet .. 650 milliGRAM(s) Oral every 6 hours PRN  aspirin  chewable 81 milliGRAM(s) Oral daily  atorvastatin 40 milliGRAM(s) Oral at bedtime  enalapril 10 milliGRAM(s) Oral daily  glucagon  Injectable 1 milliGRAM(s) IntraMuscular once  heparin   Injectable 5000 Unit(s) SubCutaneous every 8 hours  influenza   Vaccine 0.5 milliLiter(s) IntraMuscular once  insulin lispro (ADMELOG) corrective regimen sliding scale   SubCutaneous three times a day before meals  insulin lispro (ADMELOG) corrective regimen sliding scale   SubCutaneous at bedtime  insulin lispro Injectable (ADMELOG) 8 Unit(s) SubCutaneous three times a day before meals  piperacillin/tazobactam IVPB.. 3.375 Gram(s) IV Intermittent every 8 hours    FHDiabetes mellitus, type 2 (Mother)    ,   PMHSeasonal allergies    Obesity (BMI 30.0-34.9)    Hydrocele, bilateral    HLD (hyperlipidemia)    Hypertension    Diabetes mellitus, type 2    PVD (peripheral vascular disease)    GERD (gastroesophageal reflux disease)    Vitamin D deficiency       PSHNo significant past surgical history        Labs                          10.7   8.66  )-----------( 237      ( 24 Sep 2022 05:45 )             34.4      09-24    135  |  100  |  12  ----------------------------<  237<H>  4.2   |  24  |  0.71    Ca    9.5      24 Sep 2022 05:45       Vital Signs Last 24 Hrs  T(C): 37.1 (24 Sep 2022 13:00), Max: 37.1 (24 Sep 2022 13:00)  T(F): 98.8 (24 Sep 2022 13:00), Max: 98.8 (24 Sep 2022 13:00)  HR: 100 (24 Sep 2022 13:00) (81 - 100)  BP: 148/94 (24 Sep 2022 13:00) (148/94 - 159/89)  BP(mean): --  RR: 16 (24 Sep 2022 13:00) (16 - 19)  SpO2: 98% (24 Sep 2022 13:00) (97% - 100%)    Parameters below as of 24 Sep 2022 13:00  Patient On (Oxygen Delivery Method): room air      Sedimentation Rate, Erythrocyte: 96 mm/Hr (09-21-22 @ 14:20)         C-Reactive Protein, Serum: 88 mg/L (09-21-22 @ 14:20)   WBC Count: 8.66 K/uL (09-24-22 @ 05:45)        PHYSICAL EXAM  LE Focused:  Pt is A&Ox3 in no acute distress  Vasc: DP/PT pulses non palpable due to edema b/l ; CFT < 3 seconds to hallux on left; receding erythema and improved swelling to the left foot. TG: warm to warm. Pedal hair absent. Varicosities absent  Derm: Left 1st interspace maceration noted with small wound that tunneled to plantar wound measuring .2 x .2cm. Left plantar foot wound sub-2nd and 3rd metatarsal heads extending plantar with hyperkeratotic edges and fibrous wound base. Trace amount of seropurulent drainage upon evaluation today  Neuro: Protective sensation absent b/l; light touch sensation diminished b/l   MSK: No POP to left foot wounds       < from: Xray Foot AP + Lateral + Oblique, Left (09.21.22 @ 15:35) >  ACC: [id number] EXAM:  XR FOOT COMP MIN 3 VIEWS LT                          PROCEDURE DATE:  09/21/2022          INTERPRETATION:  LEFT foot    CLINICAL INFORMATION: Infection..    TECHNIQUE: AP,lateral and oblique views.    FINDINGS:  A plantar ulceration with soft tissue swelling of the ball of foot.    The bones and joint spaces are intact.  No fracture or dislocation.  No radiographic evidence of osteomyelitis.    IMPRESSION:    Plantar soft tissue ulceration with soft tissue swelling.  No radiographic evidence of osteomyelitis.  If osteomyelitis is considered  despite conservative therapy, and soft   tissue / bone infection requires further assessment, follow-up MRI   recommended.    --- End of Report ---    < end of copied text >    CULTURES:     A:  - DM uncontrolled  - Left plantar foot wound; Left 1st interspace wound         P:   Patient evaluated and Chart reviewed   Discussed diagnosis and treatment with patient  Reviewed MRI results and discussed surgical intervention with patient. Answered all question to patient satisfaction  Reviewed culture results  Wound flushed with betadine/saline flush   Trace amount of seropurulent drainage today  Applied iodoform packing to left plantar foot and 1st interspace with DSD, ACE   Continue with IV antibiotics As Per ID recc  Request medical optimization for left foot debridement, bone biopsy on 9/26 at 2:30pm   ADRYAN/PVR reviewed  NWB to left foot  Discussed importance of daily foot examinations and proper shoe gear and to importance of lower Fasting Blood Glucose levels.   Podiatry to follow while in house.  Discussed with Attending Dr. Serrano

## 2022-09-25 ENCOUNTER — TRANSCRIPTION ENCOUNTER (OUTPATIENT)
Age: 42
End: 2022-09-25

## 2022-09-25 LAB
-  AMPICILLIN: SIGNIFICANT CHANGE UP
-  TETRACYCLINE: SIGNIFICANT CHANGE UP
-  VANCOMYCIN: SIGNIFICANT CHANGE UP
ANION GAP SERPL CALC-SCNC: 5 MMOL/L — SIGNIFICANT CHANGE UP (ref 5–17)
BLD GP AB SCN SERPL QL: SIGNIFICANT CHANGE UP
BUN SERPL-MCNC: 14 MG/DL — SIGNIFICANT CHANGE UP (ref 7–18)
CALCIUM SERPL-MCNC: 9.1 MG/DL — SIGNIFICANT CHANGE UP (ref 8.4–10.5)
CHLORIDE SERPL-SCNC: 104 MMOL/L — SIGNIFICANT CHANGE UP (ref 96–108)
CO2 SERPL-SCNC: 26 MMOL/L — SIGNIFICANT CHANGE UP (ref 22–31)
CREAT SERPL-MCNC: 0.81 MG/DL — SIGNIFICANT CHANGE UP (ref 0.5–1.3)
CULTURE RESULTS: SIGNIFICANT CHANGE UP
EGFR: 113 ML/MIN/1.73M2 — SIGNIFICANT CHANGE UP
GLUCOSE BLDC GLUCOMTR-MCNC: 158 MG/DL — HIGH (ref 70–99)
GLUCOSE BLDC GLUCOMTR-MCNC: 243 MG/DL — HIGH (ref 70–99)
GLUCOSE BLDC GLUCOMTR-MCNC: 257 MG/DL — HIGH (ref 70–99)
GLUCOSE BLDC GLUCOMTR-MCNC: 257 MG/DL — HIGH (ref 70–99)
GLUCOSE BLDC GLUCOMTR-MCNC: 321 MG/DL — HIGH (ref 70–99)
GLUCOSE SERPL-MCNC: 231 MG/DL — HIGH (ref 70–99)
HCT VFR BLD CALC: 35.1 % — LOW (ref 39–50)
HGB BLD-MCNC: 11.1 G/DL — LOW (ref 13–17)
MCHC RBC-ENTMCNC: 26 PG — LOW (ref 27–34)
MCHC RBC-ENTMCNC: 31.6 GM/DL — LOW (ref 32–36)
MCV RBC AUTO: 82.2 FL — SIGNIFICANT CHANGE UP (ref 80–100)
METHOD TYPE: SIGNIFICANT CHANGE UP
NRBC # BLD: 0 /100 WBCS — SIGNIFICANT CHANGE UP (ref 0–0)
ORGANISM # SPEC MICROSCOPIC CNT: SIGNIFICANT CHANGE UP
PLATELET # BLD AUTO: 313 K/UL — SIGNIFICANT CHANGE UP (ref 150–400)
POTASSIUM SERPL-MCNC: 4.1 MMOL/L — SIGNIFICANT CHANGE UP (ref 3.5–5.3)
POTASSIUM SERPL-SCNC: 4.1 MMOL/L — SIGNIFICANT CHANGE UP (ref 3.5–5.3)
RBC # BLD: 4.27 M/UL — SIGNIFICANT CHANGE UP (ref 4.2–5.8)
RBC # FLD: 13.7 % — SIGNIFICANT CHANGE UP (ref 10.3–14.5)
SARS-COV-2 RNA SPEC QL NAA+PROBE: SIGNIFICANT CHANGE UP
SODIUM SERPL-SCNC: 135 MMOL/L — SIGNIFICANT CHANGE UP (ref 135–145)
SPECIMEN SOURCE: SIGNIFICANT CHANGE UP
WBC # BLD: 8.19 K/UL — SIGNIFICANT CHANGE UP (ref 3.8–10.5)
WBC # FLD AUTO: 8.19 K/UL — SIGNIFICANT CHANGE UP (ref 3.8–10.5)

## 2022-09-25 PROCEDURE — 99233 SBSQ HOSP IP/OBS HIGH 50: CPT

## 2022-09-25 RX ORDER — INSULIN GLARGINE 100 [IU]/ML
35 INJECTION, SOLUTION SUBCUTANEOUS EVERY MORNING
Refills: 0 | Status: DISCONTINUED | OUTPATIENT
Start: 2022-09-26 | End: 2022-09-26

## 2022-09-25 RX ORDER — DEXTROSE 50 % IN WATER 50 %
12.5 SYRINGE (ML) INTRAVENOUS ONCE
Refills: 0 | Status: DISCONTINUED | OUTPATIENT
Start: 2022-09-25 | End: 2022-09-26

## 2022-09-25 RX ORDER — DEXTROSE 50 % IN WATER 50 %
25 SYRINGE (ML) INTRAVENOUS ONCE
Refills: 0 | Status: DISCONTINUED | OUTPATIENT
Start: 2022-09-25 | End: 2022-09-26

## 2022-09-25 RX ORDER — SODIUM CHLORIDE 9 MG/ML
1000 INJECTION, SOLUTION INTRAVENOUS
Refills: 0 | Status: DISCONTINUED | OUTPATIENT
Start: 2022-09-25 | End: 2022-09-26

## 2022-09-25 RX ORDER — DEXTROSE 50 % IN WATER 50 %
15 SYRINGE (ML) INTRAVENOUS ONCE
Refills: 0 | Status: DISCONTINUED | OUTPATIENT
Start: 2022-09-25 | End: 2022-09-26

## 2022-09-25 RX ORDER — INSULIN LISPRO 100/ML
10 VIAL (ML) SUBCUTANEOUS
Refills: 0 | Status: DISCONTINUED | OUTPATIENT
Start: 2022-09-25 | End: 2022-09-26

## 2022-09-25 RX ORDER — INSULIN GLARGINE 100 [IU]/ML
7 INJECTION, SOLUTION SUBCUTANEOUS ONCE
Refills: 0 | Status: COMPLETED | OUTPATIENT
Start: 2022-09-25 | End: 2022-09-25

## 2022-09-25 RX ADMIN — Medication 6: at 11:22

## 2022-09-25 RX ADMIN — HEPARIN SODIUM 5000 UNIT(S): 5000 INJECTION INTRAVENOUS; SUBCUTANEOUS at 14:15

## 2022-09-25 RX ADMIN — Medication 2: at 22:15

## 2022-09-25 RX ADMIN — Medication 10 UNIT(S): at 11:27

## 2022-09-25 RX ADMIN — Medication 2: at 17:47

## 2022-09-25 RX ADMIN — Medication 650 MILLIGRAM(S): at 20:00

## 2022-09-25 RX ADMIN — PIPERACILLIN AND TAZOBACTAM 25 GRAM(S): 4; .5 INJECTION, POWDER, LYOPHILIZED, FOR SOLUTION INTRAVENOUS at 06:05

## 2022-09-25 RX ADMIN — PIPERACILLIN AND TAZOBACTAM 25 GRAM(S): 4; .5 INJECTION, POWDER, LYOPHILIZED, FOR SOLUTION INTRAVENOUS at 14:15

## 2022-09-25 RX ADMIN — INSULIN GLARGINE 28 UNIT(S): 100 INJECTION, SOLUTION SUBCUTANEOUS at 09:16

## 2022-09-25 RX ADMIN — PIPERACILLIN AND TAZOBACTAM 25 GRAM(S): 4; .5 INJECTION, POWDER, LYOPHILIZED, FOR SOLUTION INTRAVENOUS at 22:15

## 2022-09-25 RX ADMIN — Medication 10 MILLIGRAM(S): at 06:05

## 2022-09-25 RX ADMIN — HEPARIN SODIUM 5000 UNIT(S): 5000 INJECTION INTRAVENOUS; SUBCUTANEOUS at 22:15

## 2022-09-25 RX ADMIN — HEPARIN SODIUM 5000 UNIT(S): 5000 INJECTION INTRAVENOUS; SUBCUTANEOUS at 06:05

## 2022-09-25 RX ADMIN — Medication 8 UNIT(S): at 08:26

## 2022-09-25 RX ADMIN — Medication 4: at 08:26

## 2022-09-25 RX ADMIN — Medication 81 MILLIGRAM(S): at 11:23

## 2022-09-25 RX ADMIN — Medication 650 MILLIGRAM(S): at 19:25

## 2022-09-25 RX ADMIN — INSULIN GLARGINE 7 UNIT(S): 100 INJECTION, SOLUTION SUBCUTANEOUS at 11:22

## 2022-09-25 RX ADMIN — ATORVASTATIN CALCIUM 40 MILLIGRAM(S): 80 TABLET, FILM COATED ORAL at 22:15

## 2022-09-25 NOTE — PROGRESS NOTE ADULT - ASSESSMENT
41M PMH DM, HTN, HLD, p/w L foot wound cellulitis. Admission for OM.    #L foot OM  #cellulitis of L foot  #Uncontrolled DM2  #HTN  #HLD    - appreciate ID recs, c/w zosyn  - appreciate podiatry recs - plan for debridement 9/26  - Still hyperglycemic: Yday required 14u ISS - inc lantus 28u -> 35u; increase lispro 8u -> 10u TID  - c/w ISS  - consistent carb diet  - bp wellcontrolled - c/w ace-i  - c/w statin  - c/w asa  - dvt ppx

## 2022-09-25 NOTE — PROGRESS NOTE ADULT - SUBJECTIVE AND OBJECTIVE BOX
Podiatry Interval: Pt is seen bedside in no acute distress. Patient reports he is aware of his upcoming surgical debridement. He admits to feeling no pain. Reports he will follow-up outpatient as needed and is seeking an endocrinology consult. Denies any overnight acute events. Denies further constitutional symptoms. Denies further pedal complaints.     Podiatry HPI: Pt is a 41M who presents to ED with his girlfriend with a chief complaint of worsening left foot wound. Pt states about 1.5-2 months prior, he was walking in work boots when he stepped on a metal screw. Pt admits he didn't feel it initially due to his neuropathy from diabetes and it wasn't until he noticed blood that he saw a wound. Following this, pt arrived at [facility] where he was admitted for 7 days where they did a bedside I&D and given Unasyn and later discharged on IV picc line with more unasyn. Pt states he previously had a home health nurse changing his foot dressings every other day. Admits he noticed a blistering that worsened to bottom of left foot and that he had a lot of drainage to left 1st interspace with pinpoint wound. Pt states today, he experienced chills and burning pain that worsened when walking. Denies further constitutional symptoms. Denies recent acute trauma. Denies further pedal complaints.     Patient is a 41y old  Male who presents with a chief complaint of Left foot wound r/o Osteomyelitis (21 Sep 2022 18:46)      HPI:  Patient is a 42 yo male with hx of DM (last A1c >10), HTN, HLD presents with a left foot wound. The left foot wound initially started 6 weeks ago when the patient had a screw puncture his sole of his slipper and then suffered a wound on the bottom of the left foot. Patient stated he then when to [facility] and was admitted for 7 days in which he received Unasyn and then was later discharged with PICC line for additional 2-3 weeks of IV unasyn. Patient had wound care nurse come to his home, however started to develop blistering of the left foot and enlargement of the wound on the sole of his foot. Wounds also developed between the left great toe and 2nd toe. Patient reports purulent discharge and pain. Pain is in the left foot, 10/10 burning, constant and worsened with ambulation. Patient reports fevers and chills. Patient also states he has numbness in the 2nd toe on the left side and tingling. Patient denies chest pain, sob or palpitations. Denies syncope, dizziness.     Pharmacy was called multiple times, no answer. Please confirm Insulin dosages.  (21 Sep 2022 18:46)      Patient admits to  (-) Fevers, (-) Chills, (-) Nausea, (-) Vomiting, (-) Shortness of Breath (-) calf pain (-) chest pain     Medications acetaminophen     Tablet .. 650 milliGRAM(s) Oral every 6 hours PRN  aspirin  chewable 81 milliGRAM(s) Oral daily  atorvastatin 40 milliGRAM(s) Oral at bedtime  dextrose 5%. 1000 milliLiter(s) IV Continuous <Continuous>  dextrose 5%. 1000 milliLiter(s) IV Continuous <Continuous>  dextrose 50% Injectable 25 Gram(s) IV Push once  dextrose 50% Injectable 12.5 Gram(s) IV Push once  dextrose 50% Injectable 25 Gram(s) IV Push once  dextrose Oral Gel 15 Gram(s) Oral once PRN  enalapril 10 milliGRAM(s) Oral daily  glucagon  Injectable 1 milliGRAM(s) IntraMuscular once  heparin   Injectable 5000 Unit(s) SubCutaneous every 8 hours  influenza   Vaccine 0.5 milliLiter(s) IntraMuscular once  insulin lispro (ADMELOG) corrective regimen sliding scale   SubCutaneous three times a day before meals  insulin lispro (ADMELOG) corrective regimen sliding scale   SubCutaneous at bedtime  insulin lispro Injectable (ADMELOG) 10 Unit(s) SubCutaneous three times a day with meals  piperacillin/tazobactam IVPB.. 3.375 Gram(s) IV Intermittent every 8 hours    FHDiabetes mellitus, type 2 (Mother)    ,   PMHSeasonal allergies    Obesity (BMI 30.0-34.9)    Hydrocele, bilateral    HLD (hyperlipidemia)    Hypertension    Diabetes mellitus, type 2    PVD (peripheral vascular disease)    GERD (gastroesophageal reflux disease)    Vitamin D deficiency       PSHNo significant past surgical history        Labs                          11.1   8.19  )-----------( 313      ( 25 Sep 2022 06:00 )             35.1      09-25    135  |  104  |  14  ----------------------------<  231<H>  4.1   |  26  |  0.81    Ca    9.1      25 Sep 2022 06:00       Vital Signs Last 24 Hrs  T(C): 37.2 (25 Sep 2022 13:18), Max: 37.2 (25 Sep 2022 13:18)  T(F): 99 (25 Sep 2022 13:18), Max: 99 (25 Sep 2022 13:18)  HR: 97 (25 Sep 2022 13:18) (97 - 98)  BP: 122/84 (25 Sep 2022 13:18) (122/84 - 148/93)  BP(mean): --  RR: 16 (25 Sep 2022 13:18) (16 - 18)  SpO2: 96% (25 Sep 2022 13:18) (95% - 97%)    Parameters below as of 25 Sep 2022 13:18  Patient On (Oxygen Delivery Method): room air      Sedimentation Rate, Erythrocyte: 96 mm/Hr (09-21-22 @ 14:20)         C-Reactive Protein, Serum: 88 mg/L (09-21-22 @ 14:20)   WBC Count: 8.19 K/uL (09-25-22 @ 06:00)        PHYSICAL EXAM  LE Focused:  Pt is A&Ox3 in no acute distress  Left foot focused  Vasc: DP/PT pulses faintly palpable ; CFT < 3 seconds to hallux on left; receding erythema and improved swelling to the left foot. TG: warm to cool. Pedal hair absent. Varicosities absent.   Derm: Left 1st interspace maceration noted with small wound that tunnels to plantar wound measuring .2 x .2cm. Left plantar foot wound sub-2nd and 3rd metatarsal heads extending plantar with hyperkeratotic edges and fibrotic wound bed. Decreased drainage upon evaluation today  Neuro: Protective sensation absent b/l; light touch sensation diminished b/l   MSK: No POP to left foot wounds       < from: Xray Foot AP + Lateral + Oblique, Left (09.21.22 @ 15:35) >  ACC: [id number] EXAM:  XR FOOT COMP MIN 3 VIEWS LT                          PROCEDURE DATE:  09/21/2022          INTERPRETATION:  LEFT foot    CLINICAL INFORMATION: Infection..    TECHNIQUE: AP,lateral and oblique views.    FINDINGS:  A plantar ulceration with soft tissue swelling of the ball of foot.    The bones and joint spaces are intact.  No fracture or dislocation.  No radiographic evidence of osteomyelitis.    IMPRESSION:    Plantar soft tissue ulceration with soft tissue swelling.  No radiographic evidence of osteomyelitis.  If osteomyelitis is considered  despite conservative therapy, and soft   tissue / bone infection requires further assessment, follow-up MRI   recommended.    --- End of Report ---    < end of copied text >    CULTURES:    Podiatry Interval: Pt is seen bedside in no acute distress. Patient reports he is aware of his upcoming surgical debridement. He admits to feeling no pain. Reports he will follow-up outpatient as needed and is seeking an endocrinology consult. Denies any overnight acute events. Denies further constitutional symptoms. Denies further pedal complaints.     Podiatry HPI: Pt is a 41M who presents to ED with his girlfriend with a chief complaint of worsening left foot wound. Pt states about 1.5-2 months prior, he was walking in work boots when he stepped on a metal screw. Pt admits he didn't feel it initially due to his neuropathy from diabetes and it wasn't until he noticed blood that he saw a wound. Following this, pt arrived at [facility] where he was admitted for 7 days where they did a bedside I&D and given Unasyn and later discharged on IV picc line with more unasyn. Pt states he previously had a home health nurse changing his foot dressings every other day. Admits he noticed a blistering that worsened to bottom of left foot and that he had a lot of drainage to left 1st interspace with pinpoint wound. Pt states today, he experienced chills and burning pain that worsened when walking. Denies further constitutional symptoms. Denies recent acute trauma. Denies further pedal complaints.     Patient is a 41y old  Male who presents with a chief complaint of Left foot wound r/o Osteomyelitis (21 Sep 2022 18:46)      HPI:  Patient is a 40 yo male with hx of DM (last A1c >10), HTN, HLD presents with a left foot wound. The left foot wound initially started 6 weeks ago when the patient had a screw puncture his sole of his slipper and then suffered a wound on the bottom of the left foot. Patient stated he then when to [facility] and was admitted for 7 days in which he received Unasyn and then was later discharged with PICC line for additional 2-3 weeks of IV unasyn. Patient had wound care nurse come to his home, however started to develop blistering of the left foot and enlargement of the wound on the sole of his foot. Wounds also developed between the left great toe and 2nd toe. Patient reports purulent discharge and pain. Pain is in the left foot, 10/10 burning, constant and worsened with ambulation. Patient reports fevers and chills. Patient also states he has numbness in the 2nd toe on the left side and tingling. Patient denies chest pain, sob or palpitations. Denies syncope, dizziness.     Pharmacy was called multiple times, no answer. Please confirm Insulin dosages.  (21 Sep 2022 18:46)      Patient admits to  (-) Fevers, (-) Chills, (-) Nausea, (-) Vomiting, (-) Shortness of Breath (-) calf pain (-) chest pain     Medications acetaminophen     Tablet .. 650 milliGRAM(s) Oral every 6 hours PRN  aspirin  chewable 81 milliGRAM(s) Oral daily  atorvastatin 40 milliGRAM(s) Oral at bedtime  dextrose 5%. 1000 milliLiter(s) IV Continuous <Continuous>  dextrose 5%. 1000 milliLiter(s) IV Continuous <Continuous>  dextrose 50% Injectable 25 Gram(s) IV Push once  dextrose 50% Injectable 12.5 Gram(s) IV Push once  dextrose 50% Injectable 25 Gram(s) IV Push once  dextrose Oral Gel 15 Gram(s) Oral once PRN  enalapril 10 milliGRAM(s) Oral daily  glucagon  Injectable 1 milliGRAM(s) IntraMuscular once  heparin   Injectable 5000 Unit(s) SubCutaneous every 8 hours  influenza   Vaccine 0.5 milliLiter(s) IntraMuscular once  insulin lispro (ADMELOG) corrective regimen sliding scale   SubCutaneous three times a day before meals  insulin lispro (ADMELOG) corrective regimen sliding scale   SubCutaneous at bedtime  insulin lispro Injectable (ADMELOG) 10 Unit(s) SubCutaneous three times a day with meals  piperacillin/tazobactam IVPB.. 3.375 Gram(s) IV Intermittent every 8 hours    FHDiabetes mellitus, type 2 (Mother)    ,   PMHSeasonal allergies    Obesity (BMI 30.0-34.9)    Hydrocele, bilateral    HLD (hyperlipidemia)    Hypertension    Diabetes mellitus, type 2    PVD (peripheral vascular disease)    GERD (gastroesophageal reflux disease)    Vitamin D deficiency       PSHNo significant past surgical history        Labs                          11.1   8.19  )-----------( 313      ( 25 Sep 2022 06:00 )             35.1      09-25    135  |  104  |  14  ----------------------------<  231<H>  4.1   |  26  |  0.81    Ca    9.1      25 Sep 2022 06:00       Vital Signs Last 24 Hrs  T(C): 37.2 (25 Sep 2022 13:18), Max: 37.2 (25 Sep 2022 13:18)  T(F): 99 (25 Sep 2022 13:18), Max: 99 (25 Sep 2022 13:18)  HR: 97 (25 Sep 2022 13:18) (97 - 98)  BP: 122/84 (25 Sep 2022 13:18) (122/84 - 148/93)  BP(mean): --  RR: 16 (25 Sep 2022 13:18) (16 - 18)  SpO2: 96% (25 Sep 2022 13:18) (95% - 97%)    Parameters below as of 25 Sep 2022 13:18  Patient On (Oxygen Delivery Method): room air      Sedimentation Rate, Erythrocyte: 96 mm/Hr (09-21-22 @ 14:20)         C-Reactive Protein, Serum: 88 mg/L (09-21-22 @ 14:20)   WBC Count: 8.19 K/uL (09-25-22 @ 06:00)        PHYSICAL EXAM  LE Focused:  Pt is A&Ox3 in no acute distress  Left foot focused  Vasc: DP/PT pulses faintly palpable ; CFT < 3 seconds to hallux on left; receding erythema and improved swelling to the left foot. TG: warm to cool. Pedal hair absent. Varicosities absent.   Derm: Left 1st interspace maceration noted with small wound that tunnels to plantar wound measuring .2 x .2cm. Left plantar foot wound sub-2nd and 3rd metatarsal heads extending plantar with hyperkeratotic edges and fibrotic wound bed. Decreased drainage upon evaluation today  Neuro: Protective sensation absent b/l; light touch sensation diminished b/l   MSK: No POP to left foot wounds       < from: Xray Foot AP + Lateral + Oblique, Left (09.21.22 @ 15:35) >  ACC: [id number] EXAM:  XR FOOT COMP MIN 3 VIEWS LT                          PROCEDURE DATE:  09/21/2022          INTERPRETATION:  LEFT foot    CLINICAL INFORMATION: Infection..    TECHNIQUE: AP,lateral and oblique views.    FINDINGS:  A plantar ulceration with soft tissue swelling of the ball of foot.    The bones and joint spaces are intact.  No fracture or dislocation.  No radiographic evidence of osteomyelitis.    IMPRESSION:    Plantar soft tissue ulceration with soft tissue swelling.  No radiographic evidence of osteomyelitis.  If osteomyelitis is considered  despite conservative therapy, and soft   tissue / bone infection requires further assessment, follow-up MRI   recommended.    --- End of Report ---    < end of copied text >        CULTURES:

## 2022-09-25 NOTE — PROGRESS NOTE ADULT - ASSESSMENT
A:  - DM uncontrolled  - Left plantar foot wound; Left 1st interspace wound         P:   Patient evaluated and Chart reviewed   Discussed diagnosis and treatment with patient  Discussed upcoming surgical plan on Monday 9/26/22 and follow-up care  Wound flushed with copious amount of betadine/saline flush   Applied betadine to left plantar foot and 1st interspace with DSD, ACE   Continue with IV antibiotics As Per ID recc  Request medical optimization for left foot debridement on 9/26 at 2:30pm   ADRYAN/PVR reviewed  NWB to left foot  Discussed importance of daily foot examinations and proper shoe gear and to importance of lower Fasting Blood Glucose levels.   Podiatry to follow while in house.  Discussed with Attending Dr. Serrano      A:  - DM uncontrolled  - Left plantar foot wound; Left 1st interspace wound   - Left 2nd metatarsal osteomyelitis        P:   Patient evaluated and Chart reviewed   Discussed diagnosis and treatment with patient  Discussed upcoming surgical plan on Monday 9/26/22 and follow-up care. Plan for conservative care for osteomyelitis, with long term abx.  Wound flushed with copious amount of betadine/saline flush   Applied betadine to left plantar foot and 1st interspace with DSD, ACE   Continue with IV antibiotics As Per ID recc  Request medical optimization for left foot debridement, bone biopsy on 9/26 at 2:30pm   ADRYAN/PVR reviewed  NWB to left foot  Discussed importance of daily foot examinations and proper shoe gear and to importance of lower Fasting Blood Glucose levels.   Podiatry to follow while in house.  Discussed with Attending Dr. Serrano

## 2022-09-26 ENCOUNTER — RESULT REVIEW (OUTPATIENT)
Age: 42
End: 2022-09-26

## 2022-09-26 ENCOUNTER — TRANSCRIPTION ENCOUNTER (OUTPATIENT)
Age: 42
End: 2022-09-26

## 2022-09-26 ENCOUNTER — APPOINTMENT (OUTPATIENT)
Dept: PODIATRY | Facility: HOSPITAL | Age: 42
End: 2022-09-26

## 2022-09-26 DIAGNOSIS — M86.9 OSTEOMYELITIS, UNSPECIFIED: ICD-10-CM

## 2022-09-26 LAB
ANION GAP SERPL CALC-SCNC: 7 MMOL/L — SIGNIFICANT CHANGE UP (ref 5–17)
BUN SERPL-MCNC: 17 MG/DL — SIGNIFICANT CHANGE UP (ref 7–18)
CALCIUM SERPL-MCNC: 9.5 MG/DL — SIGNIFICANT CHANGE UP (ref 8.4–10.5)
CHLORIDE SERPL-SCNC: 102 MMOL/L — SIGNIFICANT CHANGE UP (ref 96–108)
CO2 SERPL-SCNC: 26 MMOL/L — SIGNIFICANT CHANGE UP (ref 22–31)
CREAT SERPL-MCNC: 0.73 MG/DL — SIGNIFICANT CHANGE UP (ref 0.5–1.3)
CULTURE RESULTS: SIGNIFICANT CHANGE UP
CULTURE RESULTS: SIGNIFICANT CHANGE UP
EGFR: 116 ML/MIN/1.73M2 — SIGNIFICANT CHANGE UP
GLUCOSE BLDC GLUCOMTR-MCNC: 112 MG/DL — HIGH (ref 70–99)
GLUCOSE BLDC GLUCOMTR-MCNC: 120 MG/DL — HIGH (ref 70–99)
GLUCOSE BLDC GLUCOMTR-MCNC: 205 MG/DL — HIGH (ref 70–99)
GLUCOSE BLDC GLUCOMTR-MCNC: 229 MG/DL — HIGH (ref 70–99)
GLUCOSE SERPL-MCNC: 208 MG/DL — HIGH (ref 70–99)
HCT VFR BLD CALC: 36.1 % — LOW (ref 39–50)
HGB BLD-MCNC: 11.3 G/DL — LOW (ref 13–17)
ISLET CELL512 AB SER-ACNC: SIGNIFICANT CHANGE UP
MCHC RBC-ENTMCNC: 25.5 PG — LOW (ref 27–34)
MCHC RBC-ENTMCNC: 31.3 GM/DL — LOW (ref 32–36)
MCV RBC AUTO: 81.3 FL — SIGNIFICANT CHANGE UP (ref 80–100)
NRBC # BLD: 0 /100 WBCS — SIGNIFICANT CHANGE UP (ref 0–0)
PLATELET # BLD AUTO: 322 K/UL — SIGNIFICANT CHANGE UP (ref 150–400)
POTASSIUM SERPL-MCNC: 4 MMOL/L — SIGNIFICANT CHANGE UP (ref 3.5–5.3)
POTASSIUM SERPL-SCNC: 4 MMOL/L — SIGNIFICANT CHANGE UP (ref 3.5–5.3)
RAPID RVP RESULT: SIGNIFICANT CHANGE UP
RBC # BLD: 4.44 M/UL — SIGNIFICANT CHANGE UP (ref 4.2–5.8)
RBC # FLD: 13.8 % — SIGNIFICANT CHANGE UP (ref 10.3–14.5)
SARS-COV-2 RNA SPEC QL NAA+PROBE: SIGNIFICANT CHANGE UP
SODIUM SERPL-SCNC: 135 MMOL/L — SIGNIFICANT CHANGE UP (ref 135–145)
SPECIMEN SOURCE: SIGNIFICANT CHANGE UP
SPECIMEN SOURCE: SIGNIFICANT CHANGE UP
WBC # BLD: 8.21 K/UL — SIGNIFICANT CHANGE UP (ref 3.8–10.5)
WBC # FLD AUTO: 8.21 K/UL — SIGNIFICANT CHANGE UP (ref 3.8–10.5)

## 2022-09-26 PROCEDURE — 88307 TISSUE EXAM BY PATHOLOGIST: CPT | Mod: 26

## 2022-09-26 PROCEDURE — 99232 SBSQ HOSP IP/OBS MODERATE 35: CPT

## 2022-09-26 PROCEDURE — 88311 DECALCIFY TISSUE: CPT | Mod: 26

## 2022-09-26 PROCEDURE — 28001 DRAINAGE OF BURSA OF FOOT: CPT

## 2022-09-26 PROCEDURE — 99233 SBSQ HOSP IP/OBS HIGH 50: CPT | Mod: GC

## 2022-09-26 PROCEDURE — 11044 DBRDMT BONE 1ST 20 SQ CM/<: CPT

## 2022-09-26 PROCEDURE — 20225 BONE BIOPSY TROCAR/NDL DEEP: CPT

## 2022-09-26 RX ORDER — ONDANSETRON 8 MG/1
4 TABLET, FILM COATED ORAL ONCE
Refills: 0 | Status: DISCONTINUED | OUTPATIENT
Start: 2022-09-26 | End: 2022-09-27

## 2022-09-26 RX ORDER — SODIUM CHLORIDE 9 MG/ML
1000 INJECTION, SOLUTION INTRAVENOUS
Refills: 0 | Status: DISCONTINUED | OUTPATIENT
Start: 2022-09-26 | End: 2022-09-26

## 2022-09-26 RX ORDER — FENTANYL CITRATE 50 UG/ML
25 INJECTION INTRAVENOUS
Refills: 0 | Status: DISCONTINUED | OUTPATIENT
Start: 2022-09-26 | End: 2022-09-27

## 2022-09-26 RX ORDER — FENTANYL CITRATE 50 UG/ML
50 INJECTION INTRAVENOUS
Refills: 0 | Status: DISCONTINUED | OUTPATIENT
Start: 2022-09-26 | End: 2022-09-27

## 2022-09-26 RX ORDER — INSULIN GLARGINE 100 [IU]/ML
30 INJECTION, SOLUTION SUBCUTANEOUS ONCE
Refills: 0 | Status: COMPLETED | OUTPATIENT
Start: 2022-09-26 | End: 2022-09-26

## 2022-09-26 RX ADMIN — HEPARIN SODIUM 5000 UNIT(S): 5000 INJECTION INTRAVENOUS; SUBCUTANEOUS at 06:09

## 2022-09-26 RX ADMIN — PIPERACILLIN AND TAZOBACTAM 25 GRAM(S): 4; .5 INJECTION, POWDER, LYOPHILIZED, FOR SOLUTION INTRAVENOUS at 13:07

## 2022-09-26 RX ADMIN — Medication 10 MILLIGRAM(S): at 05:51

## 2022-09-26 RX ADMIN — INSULIN GLARGINE 30 UNIT(S): 100 INJECTION, SOLUTION SUBCUTANEOUS at 11:41

## 2022-09-26 RX ADMIN — SODIUM CHLORIDE 75 MILLILITER(S): 9 INJECTION, SOLUTION INTRAVENOUS at 07:29

## 2022-09-26 RX ADMIN — PIPERACILLIN AND TAZOBACTAM 25 GRAM(S): 4; .5 INJECTION, POWDER, LYOPHILIZED, FOR SOLUTION INTRAVENOUS at 05:50

## 2022-09-26 RX ADMIN — Medication 4: at 11:11

## 2022-09-26 RX ADMIN — Medication 4: at 07:51

## 2022-09-26 NOTE — PROGRESS NOTE ADULT - PROBLEM SELECTOR PLAN 2
- A1C 12.3; uncontrolled  - FS elevated   - C/w HSS, Lantus and Admelog coverage   - Endocrinologist-Dr. Marr following

## 2022-09-26 NOTE — BRIEF OPERATIVE NOTE - OPERATION/FINDINGS
Non viable tissue at plantar foot wound Non viable tissue at plantar foot wound, 1st interspace and dorsal 2nd MPJ abscesses

## 2022-09-26 NOTE — PROGRESS NOTE ADULT - SUBJECTIVE AND OBJECTIVE BOX
Podiatry Interval: Pt is seen bedside in no acute distress. Patient expresses verbal understanding of his upcoming surgery. Patient reports he was exposed to Covid via his roommate but has since tested negative. He admits to feeling no pain. Reports he will follow-up outpatient as needed and is seeking an endocrinology consult. Denies any overnight acute events. Denies further constitutional symptoms. Denies further pedal complaints.     Podiatry HPI: Pt is a 41M who presents to ED with his girlfriend with a chief complaint of worsening left foot wound. Pt states about 1.5-2 months prior, he was walking in work boots when he stepped on a metal screw. Pt admits he didn't feel it initially due to his neuropathy from diabetes and it wasn't until he noticed blood that he saw a wound. Following this, pt arrived at [facility] where he was admitted for 7 days where they did a bedside I&D and given Unasyn and later discharged on IV picc line with more unasyn. Pt states he previously had a home health nurse changing his foot dressings every other day. Admits he noticed a blistering that worsened to bottom of left foot and that he had a lot of drainage to left 1st interspace with pinpoint wound. Pt states today, he experienced chills and burning pain that worsened when walking. Denies further constitutional symptoms. Denies recent acute trauma. Denies further pedal complaints.     Patient is a 41y old  Male who presents with a chief complaint of Left foot wound r/o Osteomyelitis (21 Sep 2022 18:46)      HPI:  Patient is a 42 yo male with hx of DM (last A1c >10), HTN, HLD presents with a left foot wound. The left foot wound initially started 6 weeks ago when the patient had a screw puncture his sole of his slipper and then suffered a wound on the bottom of the left foot. Patient stated he then when to [facility] and was admitted for 7 days in which he received Unasyn and then was later discharged with PICC line for additional 2-3 weeks of IV unasyn. Patient had wound care nurse come to his home, however started to develop blistering of the left foot and enlargement of the wound on the sole of his foot. Wounds also developed between the left great toe and 2nd toe. Patient reports purulent discharge and pain. Pain is in the left foot, 10/10 burning, constant and worsened with ambulation. Patient reports fevers and chills. Patient also states he has numbness in the 2nd toe on the left side and tingling. Patient denies chest pain, sob or palpitations. Denies syncope, dizziness.     Pharmacy was called multiple times, no answer. Please confirm Insulin dosages.  (21 Sep 2022 18:46)    Patient admits to  (-) Fevers, (-) Chills, (-) Nausea, (-) Vomiting, (-) Shortness of Breath (-) calf pain (-) chest pain     Medications acetaminophen     Tablet .. 650 milliGRAM(s) Oral every 6 hours PRN  aspirin  chewable 81 milliGRAM(s) Oral daily  atorvastatin 40 milliGRAM(s) Oral at bedtime  dextrose 5% + sodium chloride 0.45%. 1000 milliLiter(s) IV Continuous <Continuous>  dextrose 5%. 1000 milliLiter(s) IV Continuous <Continuous>  dextrose 5%. 1000 milliLiter(s) IV Continuous <Continuous>  dextrose 50% Injectable 25 Gram(s) IV Push once  dextrose 50% Injectable 12.5 Gram(s) IV Push once  dextrose 50% Injectable 25 Gram(s) IV Push once  dextrose Oral Gel 15 Gram(s) Oral once PRN  enalapril 10 milliGRAM(s) Oral daily  glucagon  Injectable 1 milliGRAM(s) IntraMuscular once  heparin   Injectable 5000 Unit(s) SubCutaneous every 8 hours  influenza   Vaccine 0.5 milliLiter(s) IntraMuscular once  insulin glargine Injectable (LANTUS) 35 Unit(s) SubCutaneous every morning  insulin lispro (ADMELOG) corrective regimen sliding scale   SubCutaneous three times a day before meals  insulin lispro (ADMELOG) corrective regimen sliding scale   SubCutaneous at bedtime  insulin lispro Injectable (ADMELOG) 10 Unit(s) SubCutaneous three times a day with meals  piperacillin/tazobactam IVPB.. 3.375 Gram(s) IV Intermittent every 8 hours    FHDiabetes mellitus, type 2 (Mother)    ,   PMHSeasonal allergies    Obesity (BMI 30.0-34.9)    Hydrocele, bilateral    HLD (hyperlipidemia)    Hypertension    Diabetes mellitus, type 2    PVD (peripheral vascular disease)    GERD (gastroesophageal reflux disease)    Vitamin D deficiency       PSHNo significant past surgical history        Labs                          11.3   8.21  )-----------( 322      ( 26 Sep 2022 07:25 )             36.1      09-26    135  |  102  |  17  ----------------------------<  208<H>  4.0   |  26  |  0.73    Ca    9.5      26 Sep 2022 07:25       Vital Signs Last 24 Hrs  T(C): 37 (26 Sep 2022 05:46), Max: 37.3 (25 Sep 2022 21:03)  T(F): 98.6 (26 Sep 2022 05:46), Max: 99.2 (25 Sep 2022 21:03)  HR: 81 (26 Sep 2022 05:46) (81 - 104)  BP: 143/85 (26 Sep 2022 05:46) (122/84 - 143/85)  BP(mean): --  RR: 18 (26 Sep 2022 05:46) (16 - 18)  SpO2: 98% (26 Sep 2022 05:46) (96% - 98%)    Parameters below as of 26 Sep 2022 05:46  Patient On (Oxygen Delivery Method): room air      Sedimentation Rate, Erythrocyte: 96 mm/Hr (09-21-22 @ 14:20)         C-Reactive Protein, Serum: 88 mg/L (09-21-22 @ 14:20)   WBC Count: 8.21 K/uL (09-26-22 @ 07:25)        PHYSICAL EXAM  LE Focused:  Pt is A&Ox3 in no acute distress  Left foot focused  Vasc: DP/PT pulses faintly palpable ; CFT < 3 seconds to hallux on left; improved erythema and improved swelling to the left foot. TG: warm to cool. Pedal hair absent. Varicosities absent.   Derm: Left 1st interspace maceration noted with small wound that tunnels to plantar wound measuring .2 x .2cm. Left plantar foot wound sub-2nd and 3rd metatarsal heads extending plantar with hyperkeratotic edges and fibrotic wound bed. Seropurulent drainage noted at the tunneling wound today  Neuro: Protective sensation absent b/l; light touch sensation diminished b/l   MSK: No POP to left foot wounds       < from: Xray Foot AP + Lateral + Oblique, Left (09.21.22 @ 15:35) >  ACC: [id number] EXAM:  XR FOOT COMP MIN 3 VIEWS LT                          PROCEDURE DATE:  09/21/2022          INTERPRETATION:  LEFT foot    CLINICAL INFORMATION: Infection..    TECHNIQUE: AP,lateral and oblique views.    FINDINGS:  A plantar ulceration with soft tissue swelling of the ball of foot.    The bones and joint spaces are intact.  No fracture or dislocation.  No radiographic evidence of osteomyelitis.    IMPRESSION:    Plantar soft tissue ulceration with soft tissue swelling.  No radiographic evidence of osteomyelitis.  If osteomyelitis is considered  despite conservative therapy, and soft   tissue / bone infection requires further assessment, follow-up MRI   recommended.    --- End of Report ---    < end of copied text >        CULTURES:

## 2022-09-26 NOTE — CHART NOTE - NSCHARTNOTEFT_GEN_A_CORE
EVENT: Room mate Covid+    BRIEF HPI: 41M PMH DM, HTN, HLD, p/w L foot wound cellulitis. Admission for OM.  Plan for debridement 9/26. Now exposed.    OBJECTIVE:  Vital Signs Last 24 Hrs  T(C): 37.3 (25 Sep 2022 21:03), Max: 37.3 (25 Sep 2022 21:03)  T(F): 99.2 (25 Sep 2022 21:03), Max: 99.2 (25 Sep 2022 21:03)  HR: 104 (25 Sep 2022 21:03) (97 - 104)  BP: 138/92 (25 Sep 2022 21:03) (122/84 - 140/92)  BP(mean): --  RR: 18 (25 Sep 2022 21:03) (16 - 18)  SpO2: 96% (25 Sep 2022 21:03) (95% - 96%)    Parameters below as of 25 Sep 2022 21:03  Patient On (Oxygen Delivery Method): room air    FOCUSED PHYSICAL EXAM:  RESP: Evven, unlabored, room air  CV: Mild tach, S1 S2  NEURO: Alert, oriented X 4  EXT: Dsg to left foot    LABS:                        11.1   8.19  )-----------( 313      ( 25 Sep 2022 06:00 )             35.1     09-25    135  |  104  |  14  ----------------------------<  231<H>  4.1   |  26  |  0.81    Ca    9.1      25 Sep 2022 06:00    COVID-19 PCR: NotDetec (25 Sep 2022 09:15)  COVID-19 PCR: NotDetec (21 Sep 2022 14:20)    PLAN:   1. Covid/flu pcr    FOLLOW UP / RESULT: Covid results

## 2022-09-26 NOTE — PROGRESS NOTE ADULT - PROBLEM SELECTOR PLAN 1
- P/w open wound on the sole of the left foot  - continue NWB to left foot  - S/p Tetanus  - continue IV Zosyn  - Blood cx negative  - Left foot MR confirms OM  - for OR today, 9/26 for I & D with possible wound vac   - Sx wound cx pending-f/u results   - Podiatry following   - ID, Dr. Stratton following

## 2022-09-26 NOTE — PROGRESS NOTE ADULT - SUBJECTIVE AND OBJECTIVE BOX
Writer Subjective:  Chart Notes, Work list Manager, and fingersticks reviewed. Patient reports of feeling pain in left foot. His appetite is excellent and eating 100% of meals. He denies drinking orange juices or eating any other concentrated sweets.     Medications (Standing):  aspirin  chewable 81 milliGRAM(s) Oral daily  atorvastatin 40 milliGRAM(s) Oral at bedtime  dextrose 5% + sodium chloride 0.45%. 1000 milliLiter(s) (75 mL/Hr) IV Continuous <Continuous>  dextrose 5%. 1000 milliLiter(s) (50 mL/Hr) IV Continuous <Continuous>  dextrose 5%. 1000 milliLiter(s) (100 mL/Hr) IV Continuous <Continuous>  dextrose 50% Injectable 25 Gram(s) IV Push once  dextrose 50% Injectable 12.5 Gram(s) IV Push once  dextrose 50% Injectable 25 Gram(s) IV Push once  enalapril 10 milliGRAM(s) Oral daily  glucagon  Injectable 1 milliGRAM(s) IntraMuscular once  heparin   Injectable 5000 Unit(s) SubCutaneous every 8 hours  influenza   Vaccine 0.5 milliLiter(s) IntraMuscular once  insulin glargine Injectable (LANTUS) 30 Unit(s) SubCutaneous once  insulin glargine Injectable (LANTUS) 35 Unit(s) SubCutaneous every morning  insulin lispro (ADMELOG) corrective regimen sliding scale   SubCutaneous three times a day before meals  insulin lispro (ADMELOG) corrective regimen sliding scale   SubCutaneous at bedtime  insulin lispro Injectable (ADMELOG) 10 Unit(s) SubCutaneous three times a day with meals  piperacillin/tazobactam IVPB.. 3.375 Gram(s) IV Intermittent every 8 hours    Medications (PRN):  acetaminophen     Tablet .. 650 milliGRAM(s) Oral every 6 hours PRN Temp greater or equal to 38C (100.4F), Mild Pain (1 - 3)  dextrose Oral Gel 15 Gram(s) Oral once PRN Blood Glucose LESS THAN 70 milliGRAM(s)/deciliter    Review of Systems:  Constitutional: No fever  Eyes: No blurry vision  Neuro: No tremors  HEENT: No pain  Cardiovascular: No chest pain, palpitations  Respiratory: No SOB, no cough  GI: No nausea, vomiting, abdominal pain  : No dysuria  Skin: no rash  Psych: no depression  Extremities: +ve left foot pain  Endocrine: no polyuria, polydipsia    Physical Examination:  VITALS: T(C): 37 (09-26-22 @ 05:46)  T(F): 98.6 (09-26-22 @ 05:46), Max: 99.2 (09-25-22 @ 21:03)  HR: 81 (09-26-22 @ 05:46) (81 - 104)  BP: 143/85 (09-26-22 @ 05:46) (122/84 - 143/85)  RR:  (16 - 18)  SpO2:  (96% - 98%)    Constitutional: Not acute distress, non- ill- appearing, obese   HEENT: Moist mucous membranes  Neck:  No JVD, bruits or thyromegaly, No thyroid nodules palpable, no LAD  Respiratory:  Respiratory effort normal, lungs clear to ausculation, without rales or rhonchi  Cardiovascular:  Regular heart rate, normal S1 and S2 sounds, without murmur, rub or gallop.  Gastrointestinal: Soft, non tender without hepatosplenomegaly and masses, mild +ve abdominal obesity  Extremities:  Bandage on the left foot present. Sensation intact in R feet, no cyanosis, clubbing or edema, positive pedal pulses +ve in right foot. Venous stasis and varicose veins +ve in lower extremities.  Neurological:  Oriented to person, place and time,     Capillary Blood glucose:   POCT Blood Glucose.: 205 mg/dL (26 Sep 2022 10:56)  POCT Blood Glucose.: 229 mg/dL (26 Sep 2022 07:32)  POCT Blood Glucose.: 257 mg/dL (25 Sep 2022 21:15)  POCT Blood Glucose.: 158 mg/dL (25 Sep 2022 17:17)    09-26  135  |  102  |  17  ----------------------------<  208<H>  4.0   |  26  |  0.73  eGFR: 116  Ca    9.5      09-26      Assessment and Plan:  41 year old male with hx of T2DM  HTN, HLD, Obesity presented with a left foot wound, admitted for left foot infection and to rule out osteomyelitis of left foot. Now scheduled for Endocrinology is consulted for glycemic management.     1) Poorly Controlled Diabetes Mellitus:  Patient most likely has type 2 diabetes as he is overweight however he was diagnosed with diabetes at the age of 24 when he was not overweight. He also has multiple family members with early age diabetes and microvascular complications. Therefore, he could have Late onset Autoimmune Diabetes ( LUIS MIGUEL) or Maturity onset of Diabetes of young. (ANA LUISA).  Anti- islet cell antibody is negative. JESÚS- 65 antibody level is testing  Home regimen: Recently started basal bolus regimen and stopped metformin, Januvia and glipizide  Patient reports good appetite.   Inpatient Blood sugars are above goal despite continuous titration of insulin doses likely due to baseline insulin resistance, excellent appetite and ongoing foot infection.   Yesterday, patient did not receive standing mealtime lispro with breakfast and dinner which led to postprandial hyperglycemia of 200s. However when patient received Lispro 10 units with lunch, his Blood glucose around dinner time was at goal.   Today Fasting Blood glucose also in 200s despite higher dose of Lantus 35 units yesterday morning  Today patient did not receive the Lantus due to NPO status  Will increase the basal insulin by 25% of the current Lantus dose and will continue the same lispro doses for now.     Inpatient Recommendations:    Please consider stopping IV dextrose fluid as patient is having hyperglycemia today despite being NPO.     Basal Insulin:   Give Glargine ( Lantus) 30 units x 1 dose today ( Patient has not received the Lantus dose today)  Start Glargine ( Lantus) 42 units from tomorrow morning. ( Please do not hold the Lantus if patient is NPO )    Nutritional Insulin:  Continue 10 units of Lispro ( Admelog) units with meals, (Hold if NPO or eating <50% of meals)    Correctional Insulin:  Continue moderate Lispro ( Admelog) correctional scale with meals and bedtime.   (If the patient is NPO in future then please change the frequency of scale to q 4 hours instead of meals and bedtime)    Oral Diabetes Medications:  None in the hospital    Dietician evaluation noted.   Follow up JESÚS-65 antibody level to evaluate the autoimmune status  Check Lipid profile.   Please consider changing the IV antibiotics premixed in normal saline solutions rather dextrose solutions     Explained that type 2 diabetes management includes insulin plus oral medications such as metformin to counteract the insulin resistance state and improve glycemic control. Therefore, will likely restart the metformin on discharge if there will be intact renal function.    2) Hyperlipidemia:  Patient reports that he was told that his cholesterol was very high.  Atorvastatin 20 mg was increased to 40 mg daily recently.     Recommendations:  Check the lipid profile  Agree to continue Atorvastatin 40 mg daily    Plan discussed in detail with patient and primary team    Carisa Marr MD   Endocrinology, Diabetes and Metabolism   Available on MS. teams                   Subjective:  Chart Notes, Work list Manager, and fingersticks reviewed. Patient reports of feeling pain in left foot. His appetite is excellent and eating 100% of meals. He denies drinking orange juices or eating any other concentrated sweets.     Medications (Standing):  aspirin  chewable 81 milliGRAM(s) Oral daily  atorvastatin 40 milliGRAM(s) Oral at bedtime  dextrose 5% + sodium chloride 0.45%. 1000 milliLiter(s) (75 mL/Hr) IV Continuous <Continuous>  dextrose 5%. 1000 milliLiter(s) (50 mL/Hr) IV Continuous <Continuous>  dextrose 5%. 1000 milliLiter(s) (100 mL/Hr) IV Continuous <Continuous>  dextrose 50% Injectable 25 Gram(s) IV Push once  dextrose 50% Injectable 12.5 Gram(s) IV Push once  dextrose 50% Injectable 25 Gram(s) IV Push once  enalapril 10 milliGRAM(s) Oral daily  glucagon  Injectable 1 milliGRAM(s) IntraMuscular once  heparin   Injectable 5000 Unit(s) SubCutaneous every 8 hours  influenza   Vaccine 0.5 milliLiter(s) IntraMuscular once  insulin glargine Injectable (LANTUS) 30 Unit(s) SubCutaneous once  insulin glargine Injectable (LANTUS) 35 Unit(s) SubCutaneous every morning  insulin lispro (ADMELOG) corrective regimen sliding scale   SubCutaneous three times a day before meals  insulin lispro (ADMELOG) corrective regimen sliding scale   SubCutaneous at bedtime  insulin lispro Injectable (ADMELOG) 10 Unit(s) SubCutaneous three times a day with meals  piperacillin/tazobactam IVPB.. 3.375 Gram(s) IV Intermittent every 8 hours    Medications (PRN):  acetaminophen     Tablet .. 650 milliGRAM(s) Oral every 6 hours PRN Temp greater or equal to 38C (100.4F), Mild Pain (1 - 3)  dextrose Oral Gel 15 Gram(s) Oral once PRN Blood Glucose LESS THAN 70 milliGRAM(s)/deciliter    Review of Systems:  Constitutional: No fever  Eyes: No blurry vision  Neuro: No tremors  HEENT: No pain  Cardiovascular: No chest pain, palpitations  Respiratory: No SOB, no cough  GI: No nausea, vomiting, abdominal pain  : No dysuria  Skin: no rash  Psych: no depression  Extremities: +ve left foot pain  Endocrine: no polyuria, polydipsia    Physical Examination:  VITALS: T(C): 37 (09-26-22 @ 05:46)  T(F): 98.6 (09-26-22 @ 05:46), Max: 99.2 (09-25-22 @ 21:03)  HR: 81 (09-26-22 @ 05:46) (81 - 104)  BP: 143/85 (09-26-22 @ 05:46) (122/84 - 143/85)  RR:  (16 - 18)  SpO2:  (96% - 98%)    Constitutional: Not acute distress, non- ill- appearing, obese   HEENT: Moist mucous membranes  Neck:  No JVD, bruits or thyromegaly, No thyroid nodules palpable, no LAD  Respiratory:  Respiratory effort normal, lungs clear to ausculation, without rales or rhonchi  Cardiovascular:  Regular heart rate, normal S1 and S2 sounds, without murmur, rub or gallop.  Gastrointestinal: Soft, non tender without hepatosplenomegaly and masses, mild +ve abdominal obesity  Extremities:  Bandage on the left foot present. Sensation intact in R feet, no cyanosis, clubbing or edema, positive pedal pulses +ve in right foot. Venous stasis and varicose veins +ve in lower extremities.  Neurological:  Oriented to person, place and time,     Capillary Blood glucose:   POCT Blood Glucose.: 205 mg/dL (26 Sep 2022 10:56)  POCT Blood Glucose.: 229 mg/dL (26 Sep 2022 07:32)  POCT Blood Glucose.: 257 mg/dL (25 Sep 2022 21:15)  POCT Blood Glucose.: 158 mg/dL (25 Sep 2022 17:17)    09-26  135  |  102  |  17  ----------------------------<  208<H>  4.0   |  26  |  0.73  eGFR: 116  Ca    9.5      09-26    Islet Cell Antibody: <1:4: INTERPRETIVE INFORMATION: Islet Cell Ab, IgG  Islet cell antibodies (ICAs) are associated with type 1 diabetes  (TID), an autoimmune endocrine disorder. ICAs may be present years  before the onset of clinical symptoms. To calculate Juvenile  DiabetesFoundation (JDF) units: multiply the titer x 5 (1:8  8 x  5 = 40 JDF Units).  This test was developed and its performance characteristics  determined by [facility]. It has not been cleared or  approved by the US Food and Drug Administration. This test was  performed in a CLIA certified laboratory and is intended for  clinical purposes.  Performed By: [facility]  [address] [place] [id number]  : Guillermo Beatty MD, PhD (09.23.22 @ 05:08)        Assessment and Plan:  41 year old male with hx of T2DM  HTN, HLD, Obesity presented with a left foot wound, admitted for left foot infection and to rule out osteomyelitis of left foot. Now scheduled for Endocrinology is consulted for glycemic management.     1) Poorly Controlled Diabetes Mellitus:  Patient most likely has type 2 diabetes as he is overweight however he was diagnosed with diabetes at the age of 24 when he was not overweight. He also has multiple family members with early age diabetes and microvascular complications. Therefore, he could have Late onset Autoimmune Diabetes ( LUIS MIGUEL) or Maturity onset of Diabetes of young. (ANA LUISA).  Anti- islet cell antibody is negative. JESÚS- 65 antibody level is testing  Home regimen: Recently started basal bolus regimen and stopped metformin, Januvia and glipizide  Patient reports good appetite.   Inpatient Blood sugars are above goal despite continuous titration of insulin doses likely due to baseline insulin resistance, excellent appetite and ongoing foot infection.   Yesterday, patient did not receive standing mealtime lispro with breakfast and dinner which led to postprandial hyperglycemia of 200s. However when patient received Lispro 10 units with lunch, his Blood glucose around dinner time was at goal.   Today Fasting Blood glucose also in 200s despite higher dose of Lantus 35 units yesterday morning  Today patient did not receive the Lantus due to NPO status  Will increase the basal insulin by 25% of the current Lantus dose and will continue the same lispro doses for now.     Inpatient Recommendations:    Please consider stopping IV dextrose fluid as patient is having hyperglycemia today despite being NPO.     Basal Insulin:   Give Glargine ( Lantus) 30 units x 1 dose today ( Patient has not received the Lantus dose today)  Start Glargine ( Lantus) 42 units from tomorrow morning. ( Please do not hold the Lantus if patient is NPO )    Nutritional Insulin:  Continue 10 units of Lispro ( Admelog) units with meals, (Hold if NPO or eating <50% of meals)    Correctional Insulin:  Continue moderate Lispro ( Admelog) correctional scale with meals and bedtime.   (If the patient is NPO in future then please change the frequency of scale to q 4 hours instead of meals and bedtime)    Oral Diabetes Medications:  None in the hospital    Dietician evaluation noted.   Follow up JESÚS-65 antibody level to evaluate the autoimmune status  Check Lipid profile.   Please consider changing the IV antibiotics premixed in normal saline solutions rather dextrose solutions     Explained that type 2 diabetes management includes insulin plus oral medications such as metformin to counteract the insulin resistance state and improve glycemic control. Therefore, will likely restart the metformin on discharge if there will be intact renal function.    2) Hyperlipidemia:  Patient  was told by his PCP that his cholesterol was very high.  Therefore, Atorvastatin 20 mg was increased to 40 mg daily recently.     Recommendations:  Check the lipid profile  Continue Atorvastatin 40 mg daily    Plan discussed in detail with patient and primary team    Carisa Marr MD   Endocrinology, Diabetes and Metabolism   Available on MS. teams

## 2022-09-26 NOTE — PROGRESS NOTE ADULT - ASSESSMENT
A:  - DM uncontrolled  - Left plantar foot wound; Left 1st interspace wound   - Left 2nd metatarsal osteomyelitis        P:   Patient evaluated and Chart reviewed   Discussed diagnosis and treatment with patient  Discussed upcoming surgical plan on Monday 9/26/22 and follow-up care. Plan for conservative care for osteomyelitis, with long term abx.  Wound flushed with copious amount of betadine/saline flush   Applied betadine to left plantar foot and 1st interspace with DSD, ACE   Continue with IV antibiotics As Per ID recc  Dressing is left CDI pre-operatively   Scheduled for left foot wound debridement and irrigation, with removal of all non viable tissue with possible wound vac placement on 9/26/22  NPO still in place since midnight   Medical clearance appreciated   Written consent signed and witness present this morning   Discussed importance of daily foot examinations and proper shoe gear and to importance of lower Fasting Blood Glucose levels.   Podiatry to follow while in house.  Discussed with Attending Dr. Serrano      A:  - DM uncontrolled  - Left plantar foot wound; Left 1st interspace wound   - Left 2nd metatarsal osteomyelitis        P:   Patient evaluated and Chart reviewed   Discussed diagnosis and treatment with patient  Discussed upcoming surgical plan on Monday 9/26/22 and follow-up care. Plan for conservative care for osteomyelitis, with long term abx.  Wound flushed with copious amount of betadine/saline flush   Applied betadine to left plantar foot and 1st interspace with DSD, ACE   Continue with IV antibiotics As Per ID recc  Dressing is left CDI pre-operatively   Scheduled for left foot wound debridement and irrigation, bone biopsy,removal of all non viable tissue with possible wound vac placement on 9/26/22  NPO still in place since midnight   Medical clearance appreciated   Written consent signed and witness present this morning   Discussed importance of daily foot examinations and proper shoe gear and to importance of lower Fasting Blood Glucose levels. Patient is at risk for non healing, delayed healing, worsening infection, amputation. Stressed importance of glycemic control and outpatient follow-up. Patient demonstrated verbal understanding.   Podiatry to follow while in house.  Discussed with Attending Dr. Serrano

## 2022-09-26 NOTE — PROGRESS NOTE ADULT - ASSESSMENT
Subjective: NAD, afebrile, L foot pain controlled, awaiting surgery today. No N/V or diarrhea, no abd pain, no constipation, no cough, no sob.     ALLERGIES No Known Allergies    REVIEW OF SYSTEMS:  CONSTITUTIONAL:  No weakness, fevers or chills  EYES/ENT:  No visual changes;  No vertigo or throat pain   NECK:  No pain or stiffness  RESPIRATORY:  No cough, wheezing, hemoptysis; No shortness of breath  CARDIOVASCULAR:  No chest pain or palpitations  GASTROINTESTINAL:  No abdominal or epigastric pain. No nausea, vomiting, or hematemesis; No diarrhea or constipation. No melena or hematochezia.  GENITOURINARY:  No dysuria, frequency or hematuria  NEUROLOGICAL:  No numbness or weakness  MSK: no back pain,left foot pain controlled, L foot wound  SKIN:  No itching, rashes    PHYSICAL EXAM:   Vital Signs Last 24 Hrs  T(C): 36.7 (26 Sep 2022 13:42), Max: 37.3 (25 Sep 2022 21:03)  T(F): 98 (26 Sep 2022 13:42), Max: 99.2 (25 Sep 2022 21:03)  HR: 82 (26 Sep 2022 13:42) (81 - 104)  BP: 152/96 (26 Sep 2022 13:42) (138/92 - 152/96)  RR: 17 (26 Sep 2022 13:42) (17 - 18)  SpO2: 98% (26 Sep 2022 13:42) (96% - 98%)    Parameters below as of 26 Sep 2022 13:42 Patient On (Oxygen Delivery Method): room air    GENERAL: NAD, non-toxic, pleasant, lying in bed comfortably  HEAD:  Atraumatic, Normocephalic  EYES: EOMI, PERRLA, conjunctiva and sclera clear  ENT: Moist mucous membranes  NECK: Supple, No JVD  CHEST/LUNG: Clear to auscultation bilaterally; No rales, rhonchi, wheezing, or rubs. Unlabored respirations  HEART: Regular rate and rhythm; No murmurs, rubs, or gallops  ABDOMEN: BSx4; Soft, nontender, nondistended  : no dysuria  EXTREMITIES:  2+ Peripheral Pulses, brisk capillary refill. No clubbing, cyanosis, or edema  NERVOUS SYSTEM:  A&Ox3. No sensory deficits, no motor deficits  SKIN: No rashes or lesions, dry and warm skin  MSK: no back pain, L foot wound/clean bandages in place, just changed by podiatry, awaiting I&D, pain controlled.     LABS/DIAGNOSTIC TESTS:                      11.3   8.21  )-----------( 322      ( 26 Sep 2022 07:25 )             36.1     WBC Count: 8.21 K/uL (09-26 @ 07:25)  WBC Count: 8.19 K/uL (09-25 @ 06:00)  WBC Count: 8.66 K/uL (09-24 @ 05:45)    09-26    135  |  102  |  17  ----------------------------<  208<H>  4.0   |  26  |  0.73    Ca    9.5      26 Sep 2022 07:25    CULTURES:   Culture - Surgical Swab (collected 09-22-22 @ 07:22)  Source: .Surgical Swab L. foot wound  Final Report (09-25-22 @ 14:59):    Few Enterobacter cloacae complex    Few Enterococcus faecalis    Few Bacteroides fragilis "Susceptibilities not performed"    Few Finegoldia magna "Susceptibilities not performed"    Few Streptococcus agalactiae (Group B) isolated    Group B streptococci aresusceptible to ampicillin, penicillin and cefazolin, but may be resistant to erythromycin and clindamycin.    Normal skin elmer isolated  Organism: Enterobacter cloacae complex  Enterococcus faecalis (09-25-22 @ 14:59)  Organism: Enterococcus faecalis (09-25-22 @ 14:59)      -  Ampicillin: S <=2 Predicts results to ampicillin/sulbactam, amoxacillin-clavulanate and  piperacillin-tazobactam.      -  Tetra/Doxy: R >8      -  Vancomycin: S 2      Method Type: JEFF    Organism: Enterobacter cloacae complex (09-25-22 @ 14:59)      -  Amikacin: S <=16      -  Amoxicillin/Clavulanic Acid: R >16/8      -  Ampicillin: R >16 These ampicillin results predict results for amoxicillin      -  Ampicillin/Sulbactam: R >16/8 Enterobacter, Klebsiella aerogenes, Citrobacter, and Serratia may develop resistance during prolonged therapy (3-4 days)      -  Aztreonam: S <=4      -  Cefazolin: R >16 Enterobacter, Klebsiella aerogenes, Citrobacter, and Serratia may develop resistance during prolonged therapy (3-4 days)      -  Cefepime: S <=2      -  Cefoxitin: R >16      -  Ceftriaxone: R 8 Enterobacter, Klebsiella aerogenes, Citrobacter, and Serratia may develop resistance during prolonged therapy      -  Ciprofloxacin: S <=0.25      -  Ertapenem: S <=0.5      -  Gentamicin: S <=2      -  Imipenem: S <=1      -  Levofloxacin: S <=0.5      -  Meropenem: S <=1      -  Piperacillin/Tazobactam: S <=8      -  Tobramycin: S <=2      -  Trimethoprim/Sulfamethoxazole: S <=0.5/9.5      Method Type: JEFF    Culture - Blood (collected 09-21-22 @ 14:20)  Source: .Blood Blood-Peripheral  Preliminary Report (09-22-22 @ 22:02):    No growth to date.    Culture - Blood (collected 09-21-22 @ 14:10)  Source: .Blood Blood-Peripheral  Preliminary Report (09-22-22 @ 22:02):    No growth to date.    Rapid RVP Result: NotDetec (09-26 @ 01:30)    ANTIBIOTICS:  piperacillin/tazobactam IVPB.. 3.375 every 8 hours    IV LINES: pIV, site OK    IMPRESSION AND PLAN:  40 yo male from [facility] with hx of DM (last A1c >10), HTN, HLD who presented for infected left foot sole ulcer previously I&D at [facility] last month and tx with IV abx for ~ 2-3 weeks total(Unasyn).  C-Reactive Protein, Serum: 88 mg/L (09-21-22 @ 14:20)    -DFI (SSTI) vs DFO after puncture trauma to L foot sole w/polymicrobial grow in cultures (enterococcus, Enterobacter cloacae complex, Bacteroides fragilis, Finegoldia magna, Streptococcus agalactiae (Group B). MRI L foot w/ multiloculated collection that is present in the first metatarsal space and dorsal soft tissues overlying the second digit, consistent with an abscess.  -Uncontrolled DM.  -Small vessel disease/ calcified vessels L foot(ADRYAN/PVR).    Continue with Zosyn 3.375 g q/8 hrs IV (4hrs infusion)  Trend ESR and CRP every 2-3 days  Planned for I&D today, please obtain Gram stain and bacterial cultures, tissue cultures, path(bone margins)  Discussed with medicine attending    Please reach ID with any questions or concerns  Dr. Natalie Wilhelm  Tel. [phone]  Available in Teams

## 2022-09-26 NOTE — PROGRESS NOTE ADULT - SUBJECTIVE AND OBJECTIVE BOX
NP Note discussed with  Primary Attending    Patient is a 41y old  Male who presents with a chief complaint of Left foot wound r/o Osteomyelitis (26 Sep 2022 11:22)      INTERVAL HPI/OVERNIGHT EVENTS: no new complaints    MEDICATIONS  (STANDING):  aspirin  chewable 81 milliGRAM(s) Oral daily  atorvastatin 40 milliGRAM(s) Oral at bedtime  dextrose 5% + sodium chloride 0.45%. 1000 milliLiter(s) (75 mL/Hr) IV Continuous <Continuous>  dextrose 5%. 1000 milliLiter(s) (50 mL/Hr) IV Continuous <Continuous>  dextrose 5%. 1000 milliLiter(s) (100 mL/Hr) IV Continuous <Continuous>  dextrose 50% Injectable 25 Gram(s) IV Push once  dextrose 50% Injectable 12.5 Gram(s) IV Push once  dextrose 50% Injectable 25 Gram(s) IV Push once  enalapril 10 milliGRAM(s) Oral daily  glucagon  Injectable 1 milliGRAM(s) IntraMuscular once  heparin   Injectable 5000 Unit(s) SubCutaneous every 8 hours  influenza   Vaccine 0.5 milliLiter(s) IntraMuscular once  insulin glargine Injectable (LANTUS) 35 Unit(s) SubCutaneous every morning  insulin lispro (ADMELOG) corrective regimen sliding scale   SubCutaneous three times a day before meals  insulin lispro (ADMELOG) corrective regimen sliding scale   SubCutaneous at bedtime  insulin lispro Injectable (ADMELOG) 10 Unit(s) SubCutaneous three times a day with meals  piperacillin/tazobactam IVPB.. 3.375 Gram(s) IV Intermittent every 8 hours    MEDICATIONS  (PRN):  acetaminophen     Tablet .. 650 milliGRAM(s) Oral every 6 hours PRN Temp greater or equal to 38C (100.4F), Mild Pain (1 - 3)  dextrose Oral Gel 15 Gram(s) Oral once PRN Blood Glucose LESS THAN 70 milliGRAM(s)/deciliter      __________________________________________________  REVIEW OF SYSTEMS:    CONSTITUTIONAL: No fever,   EYES: no acute visual disturbances  NECK: No pain or stiffness  RESPIRATORY: No cough; No shortness of breath  CARDIOVASCULAR: No chest pain, no palpitations  GASTROINTESTINAL: No pain. No nausea or vomiting; No diarrhea   NEUROLOGICAL: No headache or numbness, no tremors  MUSCULOSKELETAL: No joint pain, no muscle pain  GENITOURINARY: no dysuria, no frequency, no hesitancy  PSYCHIATRY: no depression , no anxiety  ALL OTHER  ROS negative        Vital Signs Last 24 Hrs  T(C): 37 (26 Sep 2022 05:46), Max: 37.3 (25 Sep 2022 21:03)  T(F): 98.6 (26 Sep 2022 05:46), Max: 99.2 (25 Sep 2022 21:03)  HR: 81 (26 Sep 2022 05:46) (81 - 104)  BP: 143/85 (26 Sep 2022 05:46) (122/84 - 143/85)  BP(mean): --  RR: 18 (26 Sep 2022 05:46) (16 - 18)  SpO2: 98% (26 Sep 2022 05:46) (96% - 98%)    Parameters below as of 26 Sep 2022 05:46  Patient On (Oxygen Delivery Method): room air        ________________________________________________  PHYSICAL EXAM:  GENERAL: NAD  HEENT: Normocephalic;  conjunctivae and sclerae clear; moist mucous membranes;   NECK : supple  CHEST/LUNG: Clear to auscultation bilaterally with good air entry   HEART: S1 S2  regular; no murmurs, gallops or rubs  ABDOMEN: Soft, Nontender, Nondistended; Bowel sounds present  EXTREMITIES: no cyanosis; no edema; no calf tenderness  SKIN: warm and dry; no rash  NERVOUS SYSTEM:  Awake and alert; Oriented  to place, person and time ; no new deficits    _________________________________________________  LABS:                        11.3   8.21  )-----------( 322      ( 26 Sep 2022 07:25 )             36.1     09-26    135  |  102  |  17  ----------------------------<  208<H>  4.0   |  26  |  0.73    Ca    9.5      26 Sep 2022 07:25          CAPILLARY BLOOD GLUCOSE      POCT Blood Glucose.: 205 mg/dL (26 Sep 2022 10:56)  POCT Blood Glucose.: 229 mg/dL (26 Sep 2022 07:32)  POCT Blood Glucose.: 257 mg/dL (25 Sep 2022 21:15)  POCT Blood Glucose.: 158 mg/dL (25 Sep 2022 17:17)        RADIOLOGY & ADDITIONAL TESTS:    Imaging Personally Reviewed:  YES/NO    Consultant(s) Notes Reviewed:   YES/ No    Care Discussed with Consultants :     Plan of care was discussed with patient and /or primary care giver; all questions and concerns were addressed and care was aligned with patient's wishes.     NP Note discussed with  Primary Attending    Patient is a 41y old  Male who presents with a chief complaint of Left foot wound r/o Osteomyelitis (26 Sep 2022 11:22)      INTERVAL HPI/OVERNIGHT EVENTS: COVID exposure 09/26    MEDICATIONS  (STANDING):  aspirin  chewable 81 milliGRAM(s) Oral daily  atorvastatin 40 milliGRAM(s) Oral at bedtime  dextrose 5% + sodium chloride 0.45%. 1000 milliLiter(s) (75 mL/Hr) IV Continuous <Continuous>  dextrose 5%. 1000 milliLiter(s) (50 mL/Hr) IV Continuous <Continuous>  dextrose 5%. 1000 milliLiter(s) (100 mL/Hr) IV Continuous <Continuous>  dextrose 50% Injectable 25 Gram(s) IV Push once  dextrose 50% Injectable 12.5 Gram(s) IV Push once  dextrose 50% Injectable 25 Gram(s) IV Push once  enalapril 10 milliGRAM(s) Oral daily  glucagon  Injectable 1 milliGRAM(s) IntraMuscular once  heparin   Injectable 5000 Unit(s) SubCutaneous every 8 hours  influenza   Vaccine 0.5 milliLiter(s) IntraMuscular once  insulin glargine Injectable (LANTUS) 35 Unit(s) SubCutaneous every morning  insulin lispro (ADMELOG) corrective regimen sliding scale   SubCutaneous three times a day before meals  insulin lispro (ADMELOG) corrective regimen sliding scale   SubCutaneous at bedtime  insulin lispro Injectable (ADMELOG) 10 Unit(s) SubCutaneous three times a day with meals  piperacillin/tazobactam IVPB.. 3.375 Gram(s) IV Intermittent every 8 hours    MEDICATIONS  (PRN):  acetaminophen     Tablet .. 650 milliGRAM(s) Oral every 6 hours PRN Temp greater or equal to 38C (100.4F), Mild Pain (1 - 3)  dextrose Oral Gel 15 Gram(s) Oral once PRN Blood Glucose LESS THAN 70 milliGRAM(s)/deciliter      __________________________________________________  REVIEW OF SYSTEMS:    CONSTITUTIONAL: No fever,   EYES: no acute visual disturbances  NECK: No pain or stiffness  RESPIRATORY: No cough; No shortness of breath  CARDIOVASCULAR: No chest pain, no palpitations  GASTROINTESTINAL: No pain. No nausea or vomiting; No diarrhea   NEUROLOGICAL: No headache or numbness, no tremors  MUSCULOSKELETAL: No joint pain, no muscle pain  GENITOURINARY: no dysuria, no frequency, no hesitancy  PSYCHIATRY: no depression , no anxiety  ALL OTHER  ROS negative        Vital Signs Last 24 Hrs  T(C): 37 (26 Sep 2022 05:46), Max: 37.3 (25 Sep 2022 21:03)  T(F): 98.6 (26 Sep 2022 05:46), Max: 99.2 (25 Sep 2022 21:03)  HR: 81 (26 Sep 2022 05:46) (81 - 104)  BP: 143/85 (26 Sep 2022 05:46) (122/84 - 143/85)  BP(mean): --  RR: 18 (26 Sep 2022 05:46) (16 - 18)  SpO2: 98% (26 Sep 2022 05:46) (96% - 98%)    Parameters below as of 26 Sep 2022 05:46  Patient On (Oxygen Delivery Method): room air        ________________________________________________  PHYSICAL EXAM:  GENERAL: NAD  HEENT: Normocephalic;  conjunctivae and sclerae clear  NECK : supple  CHEST/LUNG: Clear to auscultation bilaterally  HEART: S1 S2  RRR  ABDOMEN: Soft, Nontender, Nondistended; Bowel sounds present  EXTREMITIES: no edema; no calf tenderness; + L Foot wrapped in gauze  SKIN: warm and dry; no rash  NERVOUS SYSTEM:  Awake and alert; Oriented  to place, person and time    _________________________________________________  LABS:                        11.3   8.21  )-----------( 322      ( 26 Sep 2022 07:25 )             36.1     09-26    135  |  102  |  17  ----------------------------<  208<H>  4.0   |  26  |  0.73    Ca    9.5      26 Sep 2022 07:25          CAPILLARY BLOOD GLUCOSE      POCT Blood Glucose.: 205 mg/dL (26 Sep 2022 10:56)  POCT Blood Glucose.: 229 mg/dL (26 Sep 2022 07:32)  POCT Blood Glucose.: 257 mg/dL (25 Sep 2022 21:15)  POCT Blood Glucose.: 158 mg/dL (25 Sep 2022 17:17)        RADIOLOGY & ADDITIONAL TESTS:  < from: US Duplex Venous Lower Ext Ltd, Left (09.21.22 @ 15:22) >  IMPRESSION:  No evidence of left lower extremity deep venous thrombosis.    < end of copied text >      < from: Xray Foot AP + Lateral + Oblique, Left (09.21.22 @ 15:35) >  IMPRESSION:    Plantar soft tissue ulceration with soft tissue swelling.  No radiographic evidence of osteomyelitis.  If osteomyelitis is considered  despite conservative therapy, and soft   tissue / bone infection requires further assessment, follow-up MRI   recommended.    < end of copied text >    < from: MR Foot No Cont, Left (09.23.22 @ 11:28) >  IMPRESSION: Wound along the plantar aspect of the foot at the level of   the second MPJ. This communicates with a multiloculated collection that   is present in the first metatarsal space and dorsal soft tissues   overlying the second digit, consistent with an abscess. There is also a   smaller collection in the dorsal soft tissues overlying the third digit.  Areas of marrow signal alteration within the second digit metatarsal,   second digit proximal phalanx, the third digit proximal phalanx, the head   of the third metatarsal, and the head of the first metatarsal.   Differential diagnosis for the signal alteration at these sites includes   osteomyelitis and stress reaction.    < end of copied text >    Imaging Personally Reviewed:  YES    Consultant(s) Notes Reviewed:   YES    Care Discussed with Consultants : Podiatry    Plan of care was discussed with patient and /or primary care giver; all questions and concerns were addressed and care was aligned with patient's wishes.

## 2022-09-26 NOTE — PROGRESS NOTE ADULT - ASSESSMENT
41 year old male with past medical history of DM (last A1c >10), HTN, HLD who p/w left foot wound. Of note was admitted for 7 days at [facility] in which he received Unasyn and then was later discharged with a PICC line for additional 2-3 weeks of IV Unasyn, but wound started to have purulent discharge and pain also reported fevers and chills.  Admitted for Left Foot Open Wound. MRI confirmed osteomyelitis. Podiatry and Vascular consulted. No vascular intervention required at this time. ID Dr. Stratton consulted, on IV Zosyn. Patient for left foot wound debridement with Podiatry today 09/26.

## 2022-09-27 DIAGNOSIS — E11.621 TYPE 2 DIABETES MELLITUS WITH FOOT ULCER: ICD-10-CM

## 2022-09-27 DIAGNOSIS — L02.612 CUTANEOUS ABSCESS OF LEFT FOOT: ICD-10-CM

## 2022-09-27 LAB
ANION GAP SERPL CALC-SCNC: 8 MMOL/L — SIGNIFICANT CHANGE UP (ref 5–17)
BASOPHILS # BLD AUTO: 0.04 K/UL — SIGNIFICANT CHANGE UP (ref 0–0.2)
BASOPHILS NFR BLD AUTO: 0.5 % — SIGNIFICANT CHANGE UP (ref 0–2)
BUN SERPL-MCNC: 15 MG/DL — SIGNIFICANT CHANGE UP (ref 7–18)
CALCIUM SERPL-MCNC: 9.4 MG/DL — SIGNIFICANT CHANGE UP (ref 8.4–10.5)
CHLORIDE SERPL-SCNC: 102 MMOL/L — SIGNIFICANT CHANGE UP (ref 96–108)
CHOLEST SERPL-MCNC: 138 MG/DL — SIGNIFICANT CHANGE UP
CO2 SERPL-SCNC: 26 MMOL/L — SIGNIFICANT CHANGE UP (ref 22–31)
CREAT SERPL-MCNC: 0.87 MG/DL — SIGNIFICANT CHANGE UP (ref 0.5–1.3)
CRP SERPL-MCNC: 13 MG/L — HIGH
EGFR: 110 ML/MIN/1.73M2 — SIGNIFICANT CHANGE UP
EOSINOPHIL # BLD AUTO: 0.22 K/UL — SIGNIFICANT CHANGE UP (ref 0–0.5)
EOSINOPHIL NFR BLD AUTO: 2.9 % — SIGNIFICANT CHANGE UP (ref 0–6)
ERYTHROCYTE [SEDIMENTATION RATE] IN BLOOD: 86 MM/HR — HIGH (ref 0–15)
GLUCOSE BLDC GLUCOMTR-MCNC: 243 MG/DL — HIGH (ref 70–99)
GLUCOSE BLDC GLUCOMTR-MCNC: 246 MG/DL — HIGH (ref 70–99)
GLUCOSE BLDC GLUCOMTR-MCNC: 253 MG/DL — HIGH (ref 70–99)
GLUCOSE BLDC GLUCOMTR-MCNC: 282 MG/DL — HIGH (ref 70–99)
GLUCOSE SERPL-MCNC: 262 MG/DL — HIGH (ref 70–99)
GRAM STN FLD: SIGNIFICANT CHANGE UP
HCT VFR BLD CALC: 37.9 % — LOW (ref 39–50)
HDLC SERPL-MCNC: 41 MG/DL — SIGNIFICANT CHANGE UP
HGB BLD-MCNC: 12 G/DL — LOW (ref 13–17)
IMM GRANULOCYTES NFR BLD AUTO: 0.3 % — SIGNIFICANT CHANGE UP (ref 0–0.9)
LIPID PNL WITH DIRECT LDL SERPL: 80 MG/DL — SIGNIFICANT CHANGE UP
LYMPHOCYTES # BLD AUTO: 1.84 K/UL — SIGNIFICANT CHANGE UP (ref 1–3.3)
LYMPHOCYTES # BLD AUTO: 24.3 % — SIGNIFICANT CHANGE UP (ref 13–44)
MCHC RBC-ENTMCNC: 25.7 PG — LOW (ref 27–34)
MCHC RBC-ENTMCNC: 31.7 GM/DL — LOW (ref 32–36)
MCV RBC AUTO: 81.2 FL — SIGNIFICANT CHANGE UP (ref 80–100)
MONOCYTES # BLD AUTO: 0.81 K/UL — SIGNIFICANT CHANGE UP (ref 0–0.9)
MONOCYTES NFR BLD AUTO: 10.7 % — SIGNIFICANT CHANGE UP (ref 2–14)
NEUTROPHILS # BLD AUTO: 4.63 K/UL — SIGNIFICANT CHANGE UP (ref 1.8–7.4)
NEUTROPHILS NFR BLD AUTO: 61.3 % — SIGNIFICANT CHANGE UP (ref 43–77)
NON HDL CHOLESTEROL: 97 MG/DL — SIGNIFICANT CHANGE UP
NRBC # BLD: 0 /100 WBCS — SIGNIFICANT CHANGE UP (ref 0–0)
PLATELET # BLD AUTO: 336 K/UL — SIGNIFICANT CHANGE UP (ref 150–400)
POTASSIUM SERPL-MCNC: 4 MMOL/L — SIGNIFICANT CHANGE UP (ref 3.5–5.3)
POTASSIUM SERPL-SCNC: 4 MMOL/L — SIGNIFICANT CHANGE UP (ref 3.5–5.3)
RBC # BLD: 4.67 M/UL — SIGNIFICANT CHANGE UP (ref 4.2–5.8)
RBC # FLD: 14 % — SIGNIFICANT CHANGE UP (ref 10.3–14.5)
SODIUM SERPL-SCNC: 136 MMOL/L — SIGNIFICANT CHANGE UP (ref 135–145)
SPECIMEN SOURCE: SIGNIFICANT CHANGE UP
TRIGL SERPL-MCNC: 84 MG/DL — SIGNIFICANT CHANGE UP
WBC # BLD: 7.56 K/UL — SIGNIFICANT CHANGE UP (ref 3.8–10.5)
WBC # FLD AUTO: 7.56 K/UL — SIGNIFICANT CHANGE UP (ref 3.8–10.5)

## 2022-09-27 PROCEDURE — 99233 SBSQ HOSP IP/OBS HIGH 50: CPT

## 2022-09-27 RX ORDER — INSULIN LISPRO 100/ML
VIAL (ML) SUBCUTANEOUS AT BEDTIME
Refills: 0 | Status: DISCONTINUED | OUTPATIENT
Start: 2022-09-27 | End: 2022-10-04

## 2022-09-27 RX ORDER — INSULIN LISPRO 100/ML
12 VIAL (ML) SUBCUTANEOUS
Refills: 0 | Status: DISCONTINUED | OUTPATIENT
Start: 2022-09-27 | End: 2022-09-28

## 2022-09-27 RX ORDER — ACETAMINOPHEN 500 MG
650 TABLET ORAL EVERY 6 HOURS
Refills: 0 | Status: DISCONTINUED | OUTPATIENT
Start: 2022-09-27 | End: 2022-10-04

## 2022-09-27 RX ORDER — PIPERACILLIN AND TAZOBACTAM 4; .5 G/20ML; G/20ML
3.38 INJECTION, POWDER, LYOPHILIZED, FOR SOLUTION INTRAVENOUS EVERY 8 HOURS
Refills: 0 | Status: DISCONTINUED | OUTPATIENT
Start: 2022-09-27 | End: 2022-09-28

## 2022-09-27 RX ORDER — SENNA PLUS 8.6 MG/1
2 TABLET ORAL AT BEDTIME
Refills: 0 | Status: DISCONTINUED | OUTPATIENT
Start: 2022-09-27 | End: 2022-10-04

## 2022-09-27 RX ORDER — OXYCODONE HYDROCHLORIDE 5 MG/1
5 TABLET ORAL EVERY 8 HOURS
Refills: 0 | Status: DISCONTINUED | OUTPATIENT
Start: 2022-09-27 | End: 2022-10-04

## 2022-09-27 RX ORDER — SODIUM CHLORIDE 9 MG/ML
1000 INJECTION, SOLUTION INTRAVENOUS
Refills: 0 | Status: DISCONTINUED | OUTPATIENT
Start: 2022-09-27 | End: 2022-10-04

## 2022-09-27 RX ORDER — DEXTROSE 50 % IN WATER 50 %
15 SYRINGE (ML) INTRAVENOUS ONCE
Refills: 0 | Status: DISCONTINUED | OUTPATIENT
Start: 2022-09-27 | End: 2022-10-04

## 2022-09-27 RX ORDER — DEXTROSE 50 % IN WATER 50 %
25 SYRINGE (ML) INTRAVENOUS ONCE
Refills: 0 | Status: DISCONTINUED | OUTPATIENT
Start: 2022-09-27 | End: 2022-10-04

## 2022-09-27 RX ORDER — INSULIN LISPRO 100/ML
VIAL (ML) SUBCUTANEOUS
Refills: 0 | Status: DISCONTINUED | OUTPATIENT
Start: 2022-09-27 | End: 2022-10-04

## 2022-09-27 RX ORDER — GLUCAGON INJECTION, SOLUTION 0.5 MG/.1ML
1 INJECTION, SOLUTION SUBCUTANEOUS ONCE
Refills: 0 | Status: DISCONTINUED | OUTPATIENT
Start: 2022-09-27 | End: 2022-10-04

## 2022-09-27 RX ORDER — ATORVASTATIN CALCIUM 80 MG/1
40 TABLET, FILM COATED ORAL AT BEDTIME
Refills: 0 | Status: DISCONTINUED | OUTPATIENT
Start: 2022-09-27 | End: 2022-10-04

## 2022-09-27 RX ORDER — INSULIN GLARGINE 100 [IU]/ML
42 INJECTION, SOLUTION SUBCUTANEOUS EVERY MORNING
Refills: 0 | Status: DISCONTINUED | OUTPATIENT
Start: 2022-09-27 | End: 2022-09-28

## 2022-09-27 RX ORDER — DEXTROSE 50 % IN WATER 50 %
12.5 SYRINGE (ML) INTRAVENOUS ONCE
Refills: 0 | Status: DISCONTINUED | OUTPATIENT
Start: 2022-09-27 | End: 2022-10-04

## 2022-09-27 RX ORDER — INSULIN LISPRO 100/ML
10 VIAL (ML) SUBCUTANEOUS
Refills: 0 | Status: DISCONTINUED | OUTPATIENT
Start: 2022-09-27 | End: 2022-09-27

## 2022-09-27 RX ADMIN — Medication 6: at 17:01

## 2022-09-27 RX ADMIN — PIPERACILLIN AND TAZOBACTAM 25 GRAM(S): 4; .5 INJECTION, POWDER, LYOPHILIZED, FOR SOLUTION INTRAVENOUS at 06:03

## 2022-09-27 RX ADMIN — ATORVASTATIN CALCIUM 40 MILLIGRAM(S): 80 TABLET, FILM COATED ORAL at 21:32

## 2022-09-27 RX ADMIN — INSULIN GLARGINE 42 UNIT(S): 100 INJECTION, SOLUTION SUBCUTANEOUS at 09:16

## 2022-09-27 RX ADMIN — SENNA PLUS 2 TABLET(S): 8.6 TABLET ORAL at 21:32

## 2022-09-27 RX ADMIN — Medication 2: at 21:31

## 2022-09-27 RX ADMIN — Medication 10 UNIT(S): at 11:23

## 2022-09-27 RX ADMIN — PIPERACILLIN AND TAZOBACTAM 25 GRAM(S): 4; .5 INJECTION, POWDER, LYOPHILIZED, FOR SOLUTION INTRAVENOUS at 14:03

## 2022-09-27 RX ADMIN — PIPERACILLIN AND TAZOBACTAM 25 GRAM(S): 4; .5 INJECTION, POWDER, LYOPHILIZED, FOR SOLUTION INTRAVENOUS at 21:32

## 2022-09-27 RX ADMIN — Medication 10 UNIT(S): at 08:27

## 2022-09-27 RX ADMIN — Medication 12 UNIT(S): at 17:01

## 2022-09-27 NOTE — PROGRESS NOTE ADULT - PROBLEM SELECTOR PLAN 2
- A1C 12.3; uncontrolled  - FS elevated   - C/w HSS, Lantus and Admelog coverage   - Endocrinologist-Dr. Marr following - A1C 12.3; uncontrolled  - FS elevated   - continue moderate ISS  - continue 42U Lantus and Admelog coverage   - Endocrinologist-Dr. Marr following

## 2022-09-27 NOTE — PROGRESS NOTE ADULT - SUBJECTIVE AND OBJECTIVE BOX
Subjective:  Chart Notes, Work list Manager, and fingersticks reviewed. Patient reports of feeling better and denies pain in leg. Patient states that he came back from PACU late at night and ate home made food brought by his wife which was small portion of rice and protein. He then ate a small cup cake. He denies eating snack past midnight.     Medications (Standing):  atorvastatin 40 milliGRAM(s) Oral at bedtime  dextrose 5%. 1000 milliLiter(s) (50 mL/Hr) IV Continuous <Continuous>  dextrose 5%. 1000 milliLiter(s) (100 mL/Hr) IV Continuous <Continuous>  dextrose 50% Injectable 25 Gram(s) IV Push once  dextrose 50% Injectable 12.5 Gram(s) IV Push once  dextrose 50% Injectable 25 Gram(s) IV Push once  glucagon  Injectable 1 milliGRAM(s) IntraMuscular once  influenza   Vaccine 0.5 milliLiter(s) IntraMuscular once  insulin glargine Injectable (LANTUS) 42 Unit(s) SubCutaneous every morning  insulin lispro Injectable (ADMELOG) 10 Unit(s) SubCutaneous three times a day before meals  piperacillin/tazobactam IVPB.. 3.375 Gram(s) IV Intermittent every 8 hours  senna 2 Tablet(s) Oral at bedtime    Medications (PRN):  acetaminophen     Tablet .. 650 milliGRAM(s) Oral every 6 hours PRN Temp greater or equal to 38C (100.4F), Mild Pain (1 - 3)  dextrose Oral Gel 15 Gram(s) Oral once PRN Blood Glucose LESS THAN 70 milliGRAM(s)/deciliter  oxyCODONE    IR 5 milliGRAM(s) Oral every 8 hours PRN Moderate Pain (4 - 6)    Review of Systems:  Constitutional: No fever  Eyes: No blurry vision  HEENT: No pain  Cardiovascular: No chest pain, palpitations  Respiratory: No SOB, no cough  GI: No nausea, vomiting, abdominal pain  : No dysuria  Skin: no rash  Psych: no depression  Endocrine: no polyuria, polydipsia    Physical Exam:  VITALS: T(C): 37.1 (09-27-22 @ 05:06)  T(F): 98.7 (09-27-22 @ 05:06), Max: 98.7 (09-27-22 @ 05:06)  HR: 80 (09-27-22 @ 05:06) (80 - 98)  BP: 142/87 (09-27-22 @ 05:06) (141/103 - 160/102)  RR:  (12 - 19)  SpO2:  (96% - 100%)    GENERAL: NAD,   HEENT:  Atraumatic, Normocephalic, dry mucous membranes  THYROID: Normal size, no palpable nodules  RESPIRATORY: Clear to auscultation bilaterally; No rales, rhonchi, wheezing  CARDIOVASCULAR: Regular rate and rhythm; No murmurs; no peripheral edema  GI: Soft, nontender, non distended, +ve abdominal obesity  EXTREMITIES: +ve peripheral pulses, -ve pedal edema  SKIN: Dry, intact, No rashes or lesions  PSYCH: Alert and oriented x 3    CAPILLARY BLOOD GLUCOSE      POCT Blood Glucose.: 243 mg/dL (27 Sep 2022 08:10)  POCT Blood Glucose.: 120 mg/dL (26 Sep 2022 20:58)  POCT Blood Glucose.: 112 mg/dL (26 Sep 2022 16:45)    09-27    136  |  102  |  15  ----------------------------<  262<H>  4.0   |  26  |  0.87    eGFR: 110    Ca    9.4      09-27      Thyroid Function Tests:          Assessment and Plan:    1) Type 2 diabetes:      Recommendations:  - Basal Insulin:   Glargine  (Lantus) units once daily    - Nutritional Insulin:  Lispro (Admelog) units with breakfast, hold if NPO or eating <50% of meals  Lispro (Admelog) units with lunch, hold if NPO or eating <50% of meals  Lispro (Admelog) units with dinner, hold if NPO or eating <50% of meals    - Correctional (Sliding) Insulin:  Low Lispro (Admelog) sliding scale with meals and bedtime    - Oral Medications:  None in the hospital               Subjective:  Chart Notes, Work list Manager, and fingersticks reviewed. Patient reports of feeling better and denies pain in leg. Patient states that he came back from PACU late at night and ate home made food brought by his wife which was small portion of rice and protein. He then ate a small cup cake. He denies eating snack past midnight.     Medications (Standing):  atorvastatin 40 milliGRAM(s) Oral at bedtime  dextrose 5%. 1000 milliLiter(s) (50 mL/Hr) IV Continuous <Continuous>  dextrose 5%. 1000 milliLiter(s) (100 mL/Hr) IV Continuous <Continuous>  dextrose 50% Injectable 25 Gram(s) IV Push once  dextrose 50% Injectable 12.5 Gram(s) IV Push once  dextrose 50% Injectable 25 Gram(s) IV Push once  glucagon  Injectable 1 milliGRAM(s) IntraMuscular once  influenza   Vaccine 0.5 milliLiter(s) IntraMuscular once  insulin glargine Injectable (LANTUS) 42 Unit(s) SubCutaneous every morning  insulin lispro Injectable (ADMELOG) 10 Unit(s) SubCutaneous three times a day before meals  piperacillin/tazobactam IVPB.. 3.375 Gram(s) IV Intermittent every 8 hours  senna 2 Tablet(s) Oral at bedtime    Medications (PRN):  acetaminophen     Tablet .. 650 milliGRAM(s) Oral every 6 hours PRN Temp greater or equal to 38C (100.4F), Mild Pain (1 - 3)  dextrose Oral Gel 15 Gram(s) Oral once PRN Blood Glucose LESS THAN 70 milliGRAM(s)/deciliter  oxyCODONE    IR 5 milliGRAM(s) Oral every 8 hours PRN Moderate Pain (4 - 6)    Review of Systems:  Constitutional: No fever  Eyes: No blurry vision  HEENT: No pain  Cardiovascular: No chest pain, palpitations  Respiratory: No SOB, no cough  GI: No nausea, vomiting, abdominal pain  : No dysuria  Skin: no rash  Psych: no depression  Extremities: No pain in Left foot  Endocrine: no polyuria, polydipsia    Physical Exam:  VITALS: T(C): 37.1 (09-27-22 @ 05:06)  T(F): 98.7 (09-27-22 @ 05:06), Max: 98.7 (09-27-22 @ 05:06)  HR: 80 (09-27-22 @ 05:06) (80 - 98)  BP: 142/87 (09-27-22 @ 05:06) (141/103 - 160/102)  RR:  (12 - 19)  SpO2:  (96% - 100%)    Constitutional: Not acute distress, non- ill- appearing, obese   HEENT: Moist mucous membranes  Neck:  No JVD, bruits or thyromegaly, No thyroid nodules palpable, no LAD  Respiratory:  Respiratory effort normal, lungs clear to ausculation, without rales or rhonchi  Cardiovascular:  Regular heart rate, normal S1 and S2 sounds, without murmur, rub or gallop.  Gastrointestinal: Soft, non tender without hepatosplenomegaly and masses, mild +ve abdominal obesity  Extremities:  Bandage on the left foot present. Sensation intact in R feet, no cyanosis, clubbing or edema, positive pedal pulses +ve in right foot. Venous stasis and varicose veins +ve in lower extremities.  Neurological:  Oriented to person, place and time,       CAPILLARY BLOOD GLUCOSE    POCT Blood Glucose.: 243 mg/dL (27 Sep 2022 08:10)  POCT Blood Glucose.: 120 mg/dL (26 Sep 2022 20:58)  POCT Blood Glucose.: 112 mg/dL (26 Sep 2022 16:45)    09-27  136  |  102  |  15  ----------------------------<  262<H>  4.0   |  26  |  0.87  eGFR: 110  Ca    9.4      09-27    Islet Cell Antibody: <1:4: INTERPRETIVE INFORMATION: Islet Cell Ab, IgG  Lipid Profile (09.27.22 @ 07:59)    Cholesterol, Serum: 138 mg/dL    Triglycerides, Serum: 84 mg/dL    HDL Cholesterol, Serum: 41 mg/dL    Non HDL Cholesterol: 97: Patients Atherosclerotic Cardiovascular Disease (ASCVD) Risk  Optimal Level (mg/dL)  LDL Cholesterol (LDL-C)  All Patients                                < 100  ASCVD at Very High Risk1    < 70  Non-HDL Cholesterol (Non-HDL-C)  All Patients                       < 130  ASCVD at Very High Risk1   < 100      Assessment and Plan:  41 year old male with hx of T2DM  HTN, HLD, Obesity presented with a left foot wound, admitted for left foot infection and to rule out osteomyelitis of left foot s/p excisional debridement ulcer of left foot. Endocrinology is following for glycemic management.     1) Poorly Controlled Diabetes Mellitus:  Patient most likely has type 2 diabetes as he is overweight however he was diagnosed with diabetes at the age of 24 when he was not overweight. He also has multiple family members with early age diabetes and microvascular complications. Therefore, he could have Late onset Autoimmune Diabetes ( LUIS MIGUEL) or Maturity onset of Diabetes of young. (ANA LUISA).  Anti-islet cell antibody is negative. JESÚS- 65 antibody level is testing  Home regimen: Recently started basal bolus regimen and stopped metformin, Januvia and glipizide  Patient reports good appetite and ate home made food last night  In hospital, postprandial blood sugars at night were at goal when patient was NPO however today Postprandial BS above goal as patient did not receive the sliding scale insulin coverage  Fasting Blood sugars are above goal as well as patient received Lantus 30 units yesteday  Patient has received Lantus 42 units in the morning today,       Inpatient Recommendations:    Basal Insulin:   Continue Glargine ( Lantus) 42 units in the morning    Nutritional Insulin:  Increase 12 units of Lispro ( Admelog) units with meals, (Hold if NPO or eating <50% of meals)    Correctional Insulin:  Please reorder moderate Lispro ( Admelog) correctional scale with meals and bedtime.     Oral Diabetes Medications:  None in the hospital    Dietician evaluation noted.   Follow up JESÚS-65 antibody level to evaluate the autoimmune status    2) Hyperlipidemia:  Patient  was told by his PCP that his cholesterol was very high.  Therefore, Atorvastatin 20 mg was increased to 40 mg daily recently.   Lipid profile noted. LDL is at goal     Recommendations:  Continue Atorvastatin 40 mg daily    Plan discussed in detail with patient and primary team    Carisa Marr MD   Endocrinology, Diabetes and Metabolism   Available on MS. teams               Subjective:  Chart Notes, Work list Manager, and fingersticks reviewed. Patient reports of feeling better and denies pain in leg. Patient states that he came back from PACU late at night and ate home made food brought by his wife which was small portion of rice and protein. He then ate a small cup cake. He denies eating snack past midnight.     Medications (Standing):  atorvastatin 40 milliGRAM(s) Oral at bedtime  dextrose 5%. 1000 milliLiter(s) (50 mL/Hr) IV Continuous <Continuous>  dextrose 5%. 1000 milliLiter(s) (100 mL/Hr) IV Continuous <Continuous>  dextrose 50% Injectable 25 Gram(s) IV Push once  dextrose 50% Injectable 12.5 Gram(s) IV Push once  dextrose 50% Injectable 25 Gram(s) IV Push once  glucagon  Injectable 1 milliGRAM(s) IntraMuscular once  influenza   Vaccine 0.5 milliLiter(s) IntraMuscular once  insulin glargine Injectable (LANTUS) 42 Unit(s) SubCutaneous every morning  insulin lispro Injectable (ADMELOG) 10 Unit(s) SubCutaneous three times a day before meals  piperacillin/tazobactam IVPB.. 3.375 Gram(s) IV Intermittent every 8 hours  senna 2 Tablet(s) Oral at bedtime    Medications (PRN):  acetaminophen     Tablet .. 650 milliGRAM(s) Oral every 6 hours PRN Temp greater or equal to 38C (100.4F), Mild Pain (1 - 3)  dextrose Oral Gel 15 Gram(s) Oral once PRN Blood Glucose LESS THAN 70 milliGRAM(s)/deciliter  oxyCODONE    IR 5 milliGRAM(s) Oral every 8 hours PRN Moderate Pain (4 - 6)    Review of Systems:  Constitutional: No fever  Eyes: No blurry vision  HEENT: No pain  Cardiovascular: No chest pain, palpitations  Respiratory: No SOB, no cough  GI: No nausea, vomiting, abdominal pain  : No dysuria  Skin: no rash  Psych: no depression  Extremities: No pain in Left foot  Endocrine: no polyuria, polydipsia    Physical Exam:  VITALS: T(C): 37.1 (09-27-22 @ 05:06)  T(F): 98.7 (09-27-22 @ 05:06), Max: 98.7 (09-27-22 @ 05:06)  HR: 80 (09-27-22 @ 05:06) (80 - 98)  BP: 142/87 (09-27-22 @ 05:06) (141/103 - 160/102)  RR:  (12 - 19)  SpO2:  (96% - 100%)    Constitutional: Not acute distress, non- ill- appearing, obese   HEENT: Moist mucous membranes  Neck:  No JVD, bruits or thyromegaly, No thyroid nodules palpable, no LAD  Respiratory:  Respiratory effort normal, lungs clear to ausculation, without rales or rhonchi  Cardiovascular:  Regular heart rate, normal S1 and S2 sounds, without murmur, rub or gallop.  Gastrointestinal: Soft, non tender without hepatosplenomegaly and masses, mild +ve abdominal obesity  Extremities:  Bandage on the left foot present. Sensation intact in R feet, no cyanosis, clubbing or edema, positive pedal pulses +ve in right foot. Venous stasis and varicose veins +ve in lower extremities.  Neurological:  Oriented to person, place and time,       CAPILLARY BLOOD GLUCOSE    POCT Blood Glucose.: 243 mg/dL (27 Sep 2022 08:10)  POCT Blood Glucose.: 120 mg/dL (26 Sep 2022 20:58)  POCT Blood Glucose.: 112 mg/dL (26 Sep 2022 16:45)    09-27  136  |  102  |  15  ----------------------------<  262<H>  4.0   |  26  |  0.87  eGFR: 110  Ca    9.4      09-27    Islet Cell Antibody: <1:4: INTERPRETIVE INFORMATION: Islet Cell Ab, IgG  Lipid Profile (09.27.22 @ 07:59)    Cholesterol, Serum: 138 mg/dL    Triglycerides, Serum: 84 mg/dL    HDL Cholesterol, Serum: 41 mg/dL    Non HDL Cholesterol: 97: Patients Atherosclerotic Cardiovascular Disease (ASCVD) Risk  Optimal Level (mg/dL)  LDL Cholesterol (LDL-C)  All Patients                                < 100  ASCVD at Very High Risk1    < 70  Non-HDL Cholesterol (Non-HDL-C)  All Patients                       < 130  ASCVD at Very High Risk1   < 100      Assessment and Plan:  41 year old male with hx of T2DM  HTN, HLD, Obesity presented with a left foot wound, admitted for left foot infection and to rule out osteomyelitis of left foot s/p excisional debridement ulcer of left foot. Endocrinology is following for glycemic management.     1) Poorly Controlled Diabetes Mellitus:  Patient most likely has type 2 diabetes as he is overweight however he was diagnosed with diabetes at the age of 24 when he was not overweight. He also has multiple family members with early age diabetes and microvascular complications. Therefore, he could have Late onset Autoimmune Diabetes ( LUIS MIGUEL) or Maturity onset of Diabetes of young. (ANA LUISA).  Anti-islet cell antibody is negative. JESÚS- 65 antibody level is testing  Home regimen: Recently started basal bolus regimen and stopped metformin, Januvia and glipizide  Patient reports good appetite and ate home made food last night  In hospital, postprandial blood sugars yesterday were at goal when patient was NPO however today Postprandial BS above goal as patient did not receive the sliding scale insulin coverage  Fasting Blood sugars are above goal on Lantus 30 units given yesteday  Patient has received Lantus 42 units in the morning today,   Adjust the insulin as below    Inpatient Recommendations:    Basal Insulin:   Continue Glargine ( Lantus) 42 units in the morning    Nutritional Insulin:  Increase to 12 units of Lispro ( Admelog) units with meals, (Hold if NPO or eating <50% of meals)    Correctional Insulin:  Please reorder moderate Lispro ( Admelog) correctional scale with meals and bedtime.     Oral Diabetes Medications:  None in the hospital    Follow up JESÚS-65 antibody level to evaluate the autoimmune status    2) Hyperlipidemia:  Patient  was told by his PCP that his cholesterol was very high.  Therefore, Atorvastatin 20 mg was increased to 40 mg daily recently.   Lipid profile noted. LDL is at goal     Recommendations:  Continue Atorvastatin 40 mg daily    Plan discussed in detail with patient and primary team    Carisa Marr MD   Endocrinology, Diabetes and Metabolism   Available on MS. teams

## 2022-09-27 NOTE — PROGRESS NOTE ADULT - ASSESSMENT
A:  - DM uncontrolled  - Left plantar foot wound; Left 1st interspace wound   - Left 2nd metatarsal osteomyelitis        P:   Patient evaluated and Chart reviewed   Discussed diagnosis and treatment with patient  Discussed upcoming surgical plan on Monday 9/26/22 and follow-up care. Plan for conservative care for osteomyelitis, with long term abx.  Wound flushed with copious amount of betadine/saline flush   Removed previous iodoform packing and DSD  Wound vac applied to left plantar foot with black foam that bridged to dorsal foot at 125mmHg with secure seal and dressed with 4x4 gauze, ABD, Cole, ACE compression bandage  Continue with IV antibiotics As Per ID recc  Encouraged pt to keep left foot elevated as much as possible  Discussed importance of daily foot examinations and proper shoe gear and to importance of lower Fasting Blood Glucose levels. Patient is at risk for non healing, delayed healing, worsening infection, amputation. Stressed importance of glycemic control and outpatient follow-up. Patient demonstrated verbal understanding.   Podiatry to follow while in house.  Discussed with Attending Dr. Serrano      A:  - DM uncontrolled  - Left plantar foot wound; Left 1st interspace wound   - Left 2nd metatarsal osteomyelitis        P:   Patient evaluated and Chart reviewed   Discussed diagnosis and treatment with patient  Plan for conservative care for osteomyelitis, with long term abx.  Wound flushed with copious amount of betadine/saline flush   Removed previous iodoform packing and DSD  Wound vac applied to left plantar foot with black foam that bridged to dorsal foot at 125mmHg with secure seal and dressed with 4x4 gauze, ABD, Cole, ACE compression bandage  Continue with IV antibiotics As Per ID recc. Pending final ID recs.  Encouraged pt to keep left foot elevated as much as possible  Discussed importance of daily foot examinations and proper shoe gear and to importance of lower Fasting Blood Glucose levels. Patient is at risk for non healing, delayed healing, worsening infection, amputation. Stressed importance of glycemic control and outpatient follow-up. Patient demonstrated verbal understanding.   Podiatry to follow while in house.  Discussed with Attending Dr. Serrano

## 2022-09-27 NOTE — PROGRESS NOTE ADULT - SUBJECTIVE AND OBJECTIVE BOX
NP Note discussed with  Primary Attending    Patient is a 41y old  Male who presents with a chief complaint of Left foot wound r/o Osteomyelitis (27 Sep 2022 11:13)      INTERVAL HPI/OVERNIGHT EVENTS: s/p I&D on 09/26    MEDICATIONS  (STANDING):  atorvastatin 40 milliGRAM(s) Oral at bedtime  dextrose 5%. 1000 milliLiter(s) (50 mL/Hr) IV Continuous <Continuous>  dextrose 5%. 1000 milliLiter(s) (100 mL/Hr) IV Continuous <Continuous>  dextrose 50% Injectable 25 Gram(s) IV Push once  dextrose 50% Injectable 12.5 Gram(s) IV Push once  dextrose 50% Injectable 25 Gram(s) IV Push once  glucagon  Injectable 1 milliGRAM(s) IntraMuscular once  influenza   Vaccine 0.5 milliLiter(s) IntraMuscular once  insulin glargine Injectable (LANTUS) 42 Unit(s) SubCutaneous every morning  insulin lispro (ADMELOG) corrective regimen sliding scale   SubCutaneous three times a day before meals  insulin lispro (ADMELOG) corrective regimen sliding scale   SubCutaneous at bedtime  insulin lispro Injectable (ADMELOG) 12 Unit(s) SubCutaneous three times a day before meals  piperacillin/tazobactam IVPB.. 3.375 Gram(s) IV Intermittent every 8 hours  senna 2 Tablet(s) Oral at bedtime    MEDICATIONS  (PRN):  acetaminophen     Tablet .. 650 milliGRAM(s) Oral every 6 hours PRN Temp greater or equal to 38C (100.4F), Mild Pain (1 - 3)  dextrose Oral Gel 15 Gram(s) Oral once PRN Blood Glucose LESS THAN 70 milliGRAM(s)/deciliter  oxyCODONE    IR 5 milliGRAM(s) Oral every 8 hours PRN Moderate Pain (4 - 6)      __________________________________________________  REVIEW OF SYSTEMS:    CONSTITUTIONAL: No fever,   EYES: no acute visual disturbances  NECK: No pain or stiffness  RESPIRATORY: No cough; No shortness of breath  CARDIOVASCULAR: No chest pain, no palpitations  GASTROINTESTINAL: No pain. No nausea or vomiting; No diarrhea   NEUROLOGICAL: No headache or numbness, no tremors  MUSCULOSKELETAL: No joint pain, no muscle pain  GENITOURINARY: no dysuria, no frequency, no hesitancy  PSYCHIATRY: no depression , no anxiety  ALL OTHER  ROS negative        Vital Signs Last 24 Hrs  T(C): 36.7 (27 Sep 2022 13:38), Max: 37.1 (27 Sep 2022 05:06)  T(F): 98.1 (27 Sep 2022 13:38), Max: 98.7 (27 Sep 2022 05:06)  HR: 97 (27 Sep 2022 13:38) (80 - 98)  BP: 140/98 (27 Sep 2022 13:38) (140/98 - 160/102)  BP(mean): 109 (26 Sep 2022 21:09) (109 - 116)  RR: 16 (27 Sep 2022 13:38) (12 - 19)  SpO2: 98% (27 Sep 2022 13:38) (96% - 100%)    Parameters below as of 27 Sep 2022 13:38  Patient On (Oxygen Delivery Method): room air        ________________________________________________  PHYSICAL EXAM:  GENERAL: NAD  HEENT: Normocephalic;  conjunctivae and sclerae clear  NECK : supple  CHEST/LUNG: Clear to auscultation bilaterally  HEART: S1 S2  RRR  ABDOMEN: Soft, Nontender, Nondistended; Bowel sounds present  EXTREMITIES: no cyanosis; no edema; no calf tenderness  SKIN: warm and dry; no rash  NERVOUS SYSTEM:  Awake and alert; Oriented  to place, person and time    _________________________________________________  LABS:                        12.0   7.56  )-----------( 336      ( 27 Sep 2022 07:59 )             37.9     09-27    136  |  102  |  15  ----------------------------<  262<H>  4.0   |  26  |  0.87    Ca    9.4      27 Sep 2022 07:59          CAPILLARY BLOOD GLUCOSE      POCT Blood Glucose.: 246 mg/dL (27 Sep 2022 11:18)  POCT Blood Glucose.: 243 mg/dL (27 Sep 2022 08:10)  POCT Blood Glucose.: 120 mg/dL (26 Sep 2022 20:58)  POCT Blood Glucose.: 112 mg/dL (26 Sep 2022 16:45)        RADIOLOGY & ADDITIONAL TESTS:  < from: Xray Foot AP + Lateral + Oblique, Left (09.21.22 @ 15:35) >  IMPRESSION:    Plantar soft tissue ulceration with soft tissue swelling.  No radiographic evidence of osteomyelitis.  If osteomyelitis is considered  despite conservative therapy, and soft   tissue / bone infection requires further assessment, follow-up MRI   recommended.    < end of copied text >        < from: [facility] Physiol Extremity Lower 3+ Level, BI (09.21.22 @ 20:21) >  Findings/  Impression:  Right lower extremity: The ankle brachial index is 1.36. The pulse   waveforms are pulsatile but slightly diminished in amplitude.    Left lower extremity: The ankle brachial index is 1.29. The pulse   waveforms are diminished in the digit.    Diffusely calcified vessels limiting evaluation.  Small vessel disease suggested in the left foot.    < end of copied text >    < from: MR Foot No Cont, Left (09.23.22 @ 11:28) >  MPRESSION: Wound along the plantar aspect of the foot at the level of   the second MPJ. This communicates with a multiloculated collection that   is present in the first metatarsal space and dorsal soft tissues   overlying the second digit, consistent with an abscess. There is also a   smaller collection in the dorsal soft tissues overlying the third digit.  Areas of marrow signal alteration within the second digit metatarsal,   second digit proximal phalanx, the third digit proximal phalanx, the head   of the third metatarsal, and the head of the first metatarsal.   Differential diagnosis for the signal alteration at these sites includes   osteomyelitis and stress reaction.    < end of copied text >    Imaging Personally Reviewed:  YES    Consultant(s) Notes Reviewed:   YES      Plan of care was discussed with patient and /or primary care giver; all questions and concerns were addressed and care was aligned with patient's wishes.

## 2022-09-27 NOTE — PROGRESS NOTE ADULT - PROBLEM SELECTOR PLAN 1
- P/w open wound on the sole of the left foot  - continue NWB to left foot  - S/p Tetanus  - continue IV Zosyn  - Blood cx negative  - Left foot MR confirms OM  - for OR today, 9/26 for I & D with possible wound vac   - Sx wound cx pending-f/u results   - Podiatry following   - ID, Dr. Stratton following - P/w open wound on the sole of the left foot  - continue NWB to left foot  - S/p Tetanus  - continue IV Zosyn  - Blood cx negative  - Left foot MR confirms OM  - s/p I & D in OR on 09/26  - wound culture with polymycrobial growth  - f/u OR tissu cultures  - Podiatry following   - ID, Dr. Stratton following

## 2022-09-27 NOTE — PROGRESS NOTE ADULT - ASSESSMENT
41 year old male with past medical history of DM (last A1c >10), HTN, HLD who p/w left foot wound. Of note was admitted for 7 days at [facility] in which he received Unasyn and then was later discharged with a PICC line for additional 2-3 weeks of IV Unasyn, but wound started to have purulent discharge and pain also reported fevers and chills.  Admitted for Left Foot Open Wound. MRI confirmed osteomyelitis. Podiatry and Vascular consulted. No vascular intervention required at this time. ID Dr. Stratton consulted, on IV Zosyn. s/p left foot wound debridement on 09/26. OR cultures pending.  41 year old male with past medical history of DM (last A1c >10), HTN, HLD who p/w left foot wound. Of note was admitted for 7 days at [facility] in which he received Unasyn and then was later discharged with a PICC line for additional 2-3 weeks of IV Unasyn, but wound started to have purulent discharge and pain also reported fevers and chills.  Admitted for Left Foot Open Wound. MRI confirmed osteomyelitis. Podiatry and Vascular consulted. No vascular intervention required at this time. ID Dr. Stratton consulted, on IV Zosyn. s/p left foot wound debridement on 09/26. OR cultures pending

## 2022-09-28 LAB
GLUCOSE BLDC GLUCOMTR-MCNC: 100 MG/DL — HIGH (ref 70–99)
GLUCOSE BLDC GLUCOMTR-MCNC: 174 MG/DL — HIGH (ref 70–99)
GLUCOSE BLDC GLUCOMTR-MCNC: 215 MG/DL — HIGH (ref 70–99)
GLUCOSE BLDC GLUCOMTR-MCNC: 216 MG/DL — HIGH (ref 70–99)
SARS-COV-2 RNA SPEC QL NAA+PROBE: SIGNIFICANT CHANGE UP
SURGICAL PATHOLOGY STUDY: SIGNIFICANT CHANGE UP

## 2022-09-28 PROCEDURE — 99233 SBSQ HOSP IP/OBS HIGH 50: CPT | Mod: GC

## 2022-09-28 RX ORDER — INSULIN GLARGINE 100 [IU]/ML
8 INJECTION, SOLUTION SUBCUTANEOUS ONCE
Refills: 0 | Status: COMPLETED | OUTPATIENT
Start: 2022-09-28 | End: 2022-09-28

## 2022-09-28 RX ORDER — PIPERACILLIN AND TAZOBACTAM 4; .5 G/20ML; G/20ML
3.38 INJECTION, POWDER, LYOPHILIZED, FOR SOLUTION INTRAVENOUS EVERY 8 HOURS
Refills: 0 | Status: COMPLETED | OUTPATIENT
Start: 2022-09-28 | End: 2022-10-03

## 2022-09-28 RX ORDER — INSULIN LISPRO 100/ML
16 VIAL (ML) SUBCUTANEOUS
Refills: 0 | Status: DISCONTINUED | OUTPATIENT
Start: 2022-09-28 | End: 2022-09-29

## 2022-09-28 RX ORDER — INSULIN GLARGINE 100 [IU]/ML
50 INJECTION, SOLUTION SUBCUTANEOUS EVERY MORNING
Refills: 0 | Status: DISCONTINUED | OUTPATIENT
Start: 2022-09-28 | End: 2022-09-29

## 2022-09-28 RX ADMIN — Medication 12 UNIT(S): at 11:23

## 2022-09-28 RX ADMIN — PIPERACILLIN AND TAZOBACTAM 25 GRAM(S): 4; .5 INJECTION, POWDER, LYOPHILIZED, FOR SOLUTION INTRAVENOUS at 05:53

## 2022-09-28 RX ADMIN — INSULIN GLARGINE 42 UNIT(S): 100 INJECTION, SOLUTION SUBCUTANEOUS at 07:59

## 2022-09-28 RX ADMIN — Medication 12 UNIT(S): at 07:58

## 2022-09-28 RX ADMIN — SENNA PLUS 2 TABLET(S): 8.6 TABLET ORAL at 21:44

## 2022-09-28 RX ADMIN — PIPERACILLIN AND TAZOBACTAM 25 GRAM(S): 4; .5 INJECTION, POWDER, LYOPHILIZED, FOR SOLUTION INTRAVENOUS at 13:38

## 2022-09-28 RX ADMIN — Medication 10 MILLIGRAM(S): at 14:55

## 2022-09-28 RX ADMIN — INSULIN GLARGINE 8 UNIT(S): 100 INJECTION, SOLUTION SUBCUTANEOUS at 17:55

## 2022-09-28 RX ADMIN — Medication 2: at 17:07

## 2022-09-28 RX ADMIN — ATORVASTATIN CALCIUM 40 MILLIGRAM(S): 80 TABLET, FILM COATED ORAL at 21:44

## 2022-09-28 RX ADMIN — Medication 4: at 11:23

## 2022-09-28 RX ADMIN — PIPERACILLIN AND TAZOBACTAM 25 GRAM(S): 4; .5 INJECTION, POWDER, LYOPHILIZED, FOR SOLUTION INTRAVENOUS at 21:44

## 2022-09-28 RX ADMIN — Medication 4: at 07:59

## 2022-09-28 RX ADMIN — Medication 16 UNIT(S): at 17:07

## 2022-09-28 NOTE — PROGRESS NOTE ADULT - PROBLEM SELECTOR PLAN 1
MRI noted above  POD2 I&D  Podiatry placed wound vac on 9/27  C/w NWB to left foot  Continue Zosyn (day 2)  Blood cultures negative  Tissue cultures testing  Podiatry following   RD Dye, following  Recommendations appreciated

## 2022-09-28 NOTE — PROGRESS NOTE ADULT - SUBJECTIVE AND OBJECTIVE BOX
HPI:  41 YOM admitted with L foot wound.  LLE debrided and wound vac applied.    OVERNIGHT EVENTS:  No new overnight events.  Seen and examined at bedside.     REVIEW OF SYSTEMS:      CONSTITUTIONAL: No fever  EYES: no acute visual disturbances  NECK: No pain or stiffness  RESPIRATORY: No cough; No shortness of breath  CARDIOVASCULAR: No chest pain, no palpitations  GASTROINTESTINAL: No pain. No nausea, vomiting or diarrhea   NEUROLOGICAL: No headache or numbness, no tremors  MUSCULOSKELETAL: No joint pain, no muscle pain  GENITOURINARY: no dysuria, no frequency, no hesitancy  PSYCHIATRY: no depression, no anxiety  ALL OTHER  ROS negative        Vital Signs Last 24 Hrs  T(C): 36.9 (28 Sep 2022 04:30), Max: 36.9 (27 Sep 2022 21:33)  T(F): 98.4 (28 Sep 2022 04:30), Max: 98.4 (27 Sep 2022 21:33)  HR: 89 (28 Sep 2022 04:30) (89 - 99)  BP: 145/82 (28 Sep 2022 04:30) (139/95 - 145/82)  BP(mean): --  RR: 18 (28 Sep 2022 04:30) (16 - 18)  SpO2: 100% (28 Sep 2022 04:30) (96% - 100%)    Parameters below as of 28 Sep 2022 04:30  Patient On (Oxygen Delivery Method): room air        ________________________________________________  PHYSICAL EXAM:    GENERAL: NAD  HEENT: Normocephalic; conjunctivae and sclerae clear;  NECK : supple, no JVD  CHEST/LUNG: Clear to auscultation; Nonlabored  HEART: S1 S2  regular  ABDOMEN: Soft, Nontender, Nondistended; Bowel sounds present  EXTREMITIES: no cyanosis; no LE edema; no calf tenderness  NERVOUS SYSTEM:  Alert; no new deficits  SKIN: warm and dry; No rashes or lesions    _________________________________________________  CURRENT MEDICATIONS:    MEDICATIONS  (STANDING):  atorvastatin 40 milliGRAM(s) Oral at bedtime  dextrose 5%. 1000 milliLiter(s) (50 mL/Hr) IV Continuous <Continuous>  dextrose 5%. 1000 milliLiter(s) (100 mL/Hr) IV Continuous <Continuous>  dextrose 50% Injectable 25 Gram(s) IV Push once  dextrose 50% Injectable 12.5 Gram(s) IV Push once  dextrose 50% Injectable 25 Gram(s) IV Push once  glucagon  Injectable 1 milliGRAM(s) IntraMuscular once  influenza   Vaccine 0.5 milliLiter(s) IntraMuscular once  insulin glargine Injectable (LANTUS) 42 Unit(s) SubCutaneous every morning  insulin lispro (ADMELOG) corrective regimen sliding scale   SubCutaneous three times a day before meals  insulin lispro (ADMELOG) corrective regimen sliding scale   SubCutaneous at bedtime  insulin lispro Injectable (ADMELOG) 12 Unit(s) SubCutaneous three times a day before meals  piperacillin/tazobactam IVPB.. 3.375 Gram(s) IV Intermittent every 8 hours  senna 2 Tablet(s) Oral at bedtime    MEDICATIONS  (PRN):  acetaminophen     Tablet .. 650 milliGRAM(s) Oral every 6 hours PRN Temp greater or equal to 38C (100.4F), Mild Pain (1 - 3)  dextrose Oral Gel 15 Gram(s) Oral once PRN Blood Glucose LESS THAN 70 milliGRAM(s)/deciliter  oxyCODONE    IR 5 milliGRAM(s) Oral every 8 hours PRN Moderate Pain (4 - 6)      __________________________________________________  LABS:                          12.0   7.56  )-----------( 336      ( 27 Sep 2022 07:59 )             37.9     09-27    136  |  102  |  15  ----------------------------<  262<H>  4.0   |  26  |  0.87    Ca    9.4      27 Sep 2022 07:59          CAPILLARY BLOOD GLUCOSE      POCT Blood Glucose.: 216 mg/dL (28 Sep 2022 11:03)  POCT Blood Glucose.: 215 mg/dL (28 Sep 2022 07:41)  POCT Blood Glucose.: 282 mg/dL (27 Sep 2022 21:08)  POCT Blood Glucose.: 253 mg/dL (27 Sep 2022 16:54)      __________________________________________________  RADIOLOGY & ADDITIONAL TESTS:    Imaging Personally Reviewed:  YES    < from: MR Foot No Cont, Left (09.23.22 @ 11:28) >  IMPRESSION: Wound along the plantar aspect of the foot at the level of   the second MPJ. This communicates with a multiloculated collection that   is present in the first metatarsal space and dorsal soft tissues   overlying the second digit, consistent with an abscess. There is also a   smaller collection in the dorsal soft tissues overlying the third digit.  Areas of marrow signal alteration within the second digit metatarsal,   second digit proximal phalanx, the third digit proximal phalanx, the head   of the third metatarsal, and the head of the first metatarsal.   Differential diagnosis for the signal alteration at these sites includes   osteomyelitis and stress reaction.    < end of copied text >    Consultant(s) Notes Reviewed:   YES     Plan of care was discussed with patient and /or primary care giver; all questions and concerns were addressed and care was aligned with patient's wishes.    Plan discussed with attending and consulting physicians.

## 2022-09-28 NOTE — PROGRESS NOTE ADULT - PROBLEM SELECTOR PLAN 2
Uncontrolled  Continue Lantus coverage QHS  Continue sliding scale insulin coverage  Continue glucose monitoring  Continue low carb diet   Dr. Marr, Endocrine, following  Recommendations appreciated

## 2022-09-29 DIAGNOSIS — M86.10 OTHER ACUTE OSTEOMYELITIS, UNSPECIFIED SITE: ICD-10-CM

## 2022-09-29 DIAGNOSIS — E11.621 TYPE 2 DIABETES MELLITUS WITH FOOT ULCER: ICD-10-CM

## 2022-09-29 LAB
GAD65 AB SER-MCNC: 0 NMOL/L — SIGNIFICANT CHANGE UP
GLUCOSE BLDC GLUCOMTR-MCNC: 114 MG/DL — HIGH (ref 70–99)
GLUCOSE BLDC GLUCOMTR-MCNC: 233 MG/DL — HIGH (ref 70–99)
GLUCOSE BLDC GLUCOMTR-MCNC: 267 MG/DL — HIGH (ref 70–99)
GLUCOSE BLDC GLUCOMTR-MCNC: 279 MG/DL — HIGH (ref 70–99)

## 2022-09-29 PROCEDURE — 36573 INSJ PICC RS&I 5 YR+: CPT

## 2022-09-29 PROCEDURE — 99233 SBSQ HOSP IP/OBS HIGH 50: CPT

## 2022-09-29 PROCEDURE — 99233 SBSQ HOSP IP/OBS HIGH 50: CPT | Mod: GC

## 2022-09-29 PROCEDURE — 99232 SBSQ HOSP IP/OBS MODERATE 35: CPT

## 2022-09-29 RX ORDER — SODIUM CHLORIDE 9 MG/ML
10 INJECTION INTRAMUSCULAR; INTRAVENOUS; SUBCUTANEOUS
Refills: 0 | Status: DISCONTINUED | OUTPATIENT
Start: 2022-09-29 | End: 2022-10-04

## 2022-09-29 RX ORDER — INSULIN GLARGINE 100 [IU]/ML
46 INJECTION, SOLUTION SUBCUTANEOUS EVERY MORNING
Refills: 0 | Status: DISCONTINUED | OUTPATIENT
Start: 2022-09-29 | End: 2022-09-30

## 2022-09-29 RX ORDER — CHLORHEXIDINE GLUCONATE 213 G/1000ML
1 SOLUTION TOPICAL DAILY
Refills: 0 | Status: DISCONTINUED | OUTPATIENT
Start: 2022-09-30 | End: 2022-10-04

## 2022-09-29 RX ORDER — INSULIN LISPRO 100/ML
14 VIAL (ML) SUBCUTANEOUS ONCE
Refills: 0 | Status: COMPLETED | OUTPATIENT
Start: 2022-09-29 | End: 2022-09-29

## 2022-09-29 RX ORDER — HEPARIN SODIUM 5000 [USP'U]/ML
5000 INJECTION INTRAVENOUS; SUBCUTANEOUS EVERY 8 HOURS
Refills: 0 | Status: DISCONTINUED | OUTPATIENT
Start: 2022-09-29 | End: 2022-10-04

## 2022-09-29 RX ORDER — PIPERACILLIN AND TAZOBACTAM 4; .5 G/20ML; G/20ML
3 INJECTION, POWDER, LYOPHILIZED, FOR SOLUTION INTRAVENOUS
Qty: 378 | Refills: 0
Start: 2022-09-29 | End: 2022-11-09

## 2022-09-29 RX ORDER — INSULIN LISPRO 100/ML
14 VIAL (ML) SUBCUTANEOUS
Refills: 0 | Status: DISCONTINUED | OUTPATIENT
Start: 2022-09-29 | End: 2022-09-30

## 2022-09-29 RX ADMIN — Medication 6: at 11:46

## 2022-09-29 RX ADMIN — Medication 14 UNIT(S): at 17:11

## 2022-09-29 RX ADMIN — PIPERACILLIN AND TAZOBACTAM 25 GRAM(S): 4; .5 INJECTION, POWDER, LYOPHILIZED, FOR SOLUTION INTRAVENOUS at 22:09

## 2022-09-29 RX ADMIN — Medication 14 UNIT(S): at 12:13

## 2022-09-29 RX ADMIN — Medication 10 MILLIGRAM(S): at 05:41

## 2022-09-29 RX ADMIN — Medication 16 UNIT(S): at 08:28

## 2022-09-29 RX ADMIN — INSULIN GLARGINE 50 UNIT(S): 100 INJECTION, SOLUTION SUBCUTANEOUS at 08:27

## 2022-09-29 RX ADMIN — PIPERACILLIN AND TAZOBACTAM 25 GRAM(S): 4; .5 INJECTION, POWDER, LYOPHILIZED, FOR SOLUTION INTRAVENOUS at 14:59

## 2022-09-29 RX ADMIN — SENNA PLUS 2 TABLET(S): 8.6 TABLET ORAL at 22:09

## 2022-09-29 RX ADMIN — Medication 2: at 21:16

## 2022-09-29 RX ADMIN — HEPARIN SODIUM 5000 UNIT(S): 5000 INJECTION INTRAVENOUS; SUBCUTANEOUS at 22:19

## 2022-09-29 RX ADMIN — PIPERACILLIN AND TAZOBACTAM 25 GRAM(S): 4; .5 INJECTION, POWDER, LYOPHILIZED, FOR SOLUTION INTRAVENOUS at 05:41

## 2022-09-29 RX ADMIN — Medication 4: at 17:11

## 2022-09-29 RX ADMIN — ATORVASTATIN CALCIUM 40 MILLIGRAM(S): 80 TABLET, FILM COATED ORAL at 22:09

## 2022-09-29 NOTE — PROGRESS NOTE ADULT - SUBJECTIVE AND OBJECTIVE BOX
Podiatry Interval: Pt is seen bedside in no acute distress with his girlfriend. Pt is in a droplet precaution exposed room. Denies pain to left foot and states he hasn't been walking. Denies issues with wound vac.  Denies any overnight acute events. Denies further constitutional symptoms. Denies further pedal complaints.     Podiatry HPI: Pt is a 41M who presents to ED with his girlfriend with a chief complaint of worsening left foot wound. Pt states about 1.5-2 months prior, he was walking in work boots when he stepped on a metal screw. Pt admits he didn't feel it initially due to his neuropathy from diabetes and it wasn't until he noticed blood that he saw a wound. Following this, pt arrived at [facility] where he was admitted for 7 days where they did a bedside I&D and given Unasyn and later discharged on IV picc line with more unasyn. Pt states he previously had a home health nurse changing his foot dressings every other day. Admits he noticed a blistering that worsened to bottom of left foot and that he had a lot of drainage to left 1st interspace with pinpoint wound. Pt states today, he experienced chills and burning pain that worsened when walking. Denies further constitutional symptoms. Denies recent acute trauma. Denies further pedal complaints.     Patient is a 41y old  Male who presents with a chief complaint of Left foot wound r/o Osteomyelitis (21 Sep 2022 18:46)      HPI:  Patient is a 40 yo male with hx of DM (last A1c >10), HTN, HLD presents with a left foot wound. The left foot wound initially started 6 weeks ago when the patient had a screw puncture his sole of his slipper and then suffered a wound on the bottom of the left foot. Patient stated he then when to [facility] and was admitted for 7 days in which he received Unasyn and then was later discharged with PICC line for additional 2-3 weeks of IV unasyn. Patient had wound care nurse come to his home, however started to develop blistering of the left foot and enlargement of the wound on the sole of his foot. Wounds also developed between the left great toe and 2nd toe. Patient reports purulent discharge and pain. Pain is in the left foot, 10/10 burning, constant and worsened with ambulation. Patient reports fevers and chills. Patient also states he has numbness in the 2nd toe on the left side and tingling. Patient denies chest pain, sob or palpitations. Denies syncope, dizziness.     Patient admits to  (-) Fevers, (-) Chills, (-) Nausea, (-) Vomiting, (-) Shortness of Breath (-) calf pain (-) chest pain     Medications acetaminophen     Tablet .. 650 milliGRAM(s) Oral every 6 hours PRN  atorvastatin 40 milliGRAM(s) Oral at bedtime  dextrose 5%. 1000 milliLiter(s) IV Continuous <Continuous>  dextrose 5%. 1000 milliLiter(s) IV Continuous <Continuous>  dextrose 50% Injectable 25 Gram(s) IV Push once  dextrose 50% Injectable 12.5 Gram(s) IV Push once  dextrose 50% Injectable 25 Gram(s) IV Push once  dextrose Oral Gel 15 Gram(s) Oral once PRN  enalapril 10 milliGRAM(s) Oral daily  glucagon  Injectable 1 milliGRAM(s) IntraMuscular once  heparin   Injectable 5000 Unit(s) SubCutaneous every 8 hours  influenza   Vaccine 0.5 milliLiter(s) IntraMuscular once  insulin glargine Injectable (LANTUS) 46 Unit(s) SubCutaneous every morning  insulin lispro (ADMELOG) corrective regimen sliding scale   SubCutaneous three times a day before meals  insulin lispro (ADMELOG) corrective regimen sliding scale   SubCutaneous at bedtime  insulin lispro Injectable (ADMELOG) 14 Unit(s) SubCutaneous three times a day before meals  oxyCODONE    IR 5 milliGRAM(s) Oral every 8 hours PRN  piperacillin/tazobactam IVPB.. 3.375 Gram(s) IV Intermittent every 8 hours  senna 2 Tablet(s) Oral at bedtime  sodium chloride 0.9% lock flush 10 milliLiter(s) IV Push every 1 hour PRN    FHDiabetes mellitus, type 2 (Mother)    ,   PMHSeasonal allergies    Obesity (BMI 30.0-34.9)    Hydrocele, bilateral    HLD (hyperlipidemia)    Hypertension    Diabetes mellitus, type 2    PVD (peripheral vascular disease)    GERD (gastroesophageal reflux disease)    Vitamin D deficiency       PSHNo significant past surgical history        Labs              Vital Signs Last 24 Hrs  T(C): 36.6 (29 Sep 2022 14:55), Max: 36.8 (28 Sep 2022 20:30)  T(F): 97.8 (29 Sep 2022 14:55), Max: 98.2 (28 Sep 2022 20:30)  HR: 98 (29 Sep 2022 14:55) (82 - 98)  BP: 136/89 (29 Sep 2022 14:55) (134/77 - 147/97)  BP(mean): --  RR: 16 (29 Sep 2022 14:55) (16 - 17)  SpO2: 99% (29 Sep 2022 14:55) (99% - 100%)    Parameters below as of 29 Sep 2022 14:55  Patient On (Oxygen Delivery Method): room air      Sedimentation Rate, Erythrocyte: 86 mm/Hr (09-27-22 @ 07:59)  Sedimentation Rate, Erythrocyte: 96 mm/Hr (09-21-22 @ 14:20)         C-Reactive Protein, Serum: 13 mg/L (09-27-22 @ 07:59)  C-Reactive Protein, Serum: 88 mg/L (09-21-22 @ 14:20)       ROS unremarkable outside of HPI      PHYSICAL EXAM  LE Focused:  Pt is A&Ox3 in no acute distress  Left foot focused  Vasc: DP/PT pulses palpable ; CFT < 3 seconds to hallux on left; improved erythema and improved swelling to the left foot. TG: warm to cool. Pedal hair absent. Varicosities absent.   Derm: Left 1st interspace maceration noted with small wound that tunnels to plantar wound measuring .2 x .2cm. Left plantar foot wound sub-2nd and 3rd metatarsal heads extending plantar with macerated edges and granular/adipose wound bed. No purulent drainage noted today; scant amount of serous drainage from interspace wound with healthy bleeding noted. Decreased plantar wound tunneling laterally.  Neuro: Protective sensation absent b/l; light touch sensation diminished b/l   MSK: No POP to left foot wounds       < from: Xray Foot AP + Lateral + Oblique, Left (09.21.22 @ 15:35) >  ACC: [id number] EXAM:  XR FOOT COMP MIN 3 VIEWS LT                          PROCEDURE DATE:  09/21/2022          INTERPRETATION:  LEFT foot    CLINICAL INFORMATION: Infection..    TECHNIQUE: AP,lateral and oblique views.    FINDINGS:  A plantar ulceration with soft tissue swelling of the ball of foot.    The bones and joint spaces are intact.  No fracture or dislocation.  No radiographic evidence of osteomyelitis.    IMPRESSION:    Plantar soft tissue ulceration with soft tissue swelling.  No radiographic evidence of osteomyelitis.  If osteomyelitis is considered  despite conservative therapy, and soft   tissue / bone infection requires further assessment, follow-up MRI   recommended.    --- End of Report ---    < end of copied text >        CULTURES:    Podiatry Interval: Pt is seen bedside in no acute distress with his girlfriend. Pt is in a droplet precaution exposed room. Denies pain to left foot and states he hasn't been walking. Denies issues with wound vac.  Denies any overnight acute events. Denies further constitutional symptoms. Denies further pedal complaints.     Podiatry HPI: Pt is a 41M who presents to ED with his girlfriend with a chief complaint of worsening left foot wound. Pt states about 1.5-2 months prior, he was walking in work boots when he stepped on a metal screw. Pt admits he didn't feel it initially due to his neuropathy from diabetes and it wasn't until he noticed blood that he saw a wound. Following this, pt arrived at [facility] where he was admitted for 7 days where they did a bedside I&D and given Unasyn and later discharged on IV picc line with more unasyn. Pt states he previously had a home health nurse changing his foot dressings every other day. Admits he noticed a blistering that worsened to bottom of left foot and that he had a lot of drainage to left 1st interspace with pinpoint wound. Pt states today, he experienced chills and burning pain that worsened when walking. Denies further constitutional symptoms. Denies recent acute trauma. Denies further pedal complaints.     Patient is a 41y old  Male who presents with a chief complaint of Left foot wound r/o Osteomyelitis (21 Sep 2022 18:46)      HPI:  Patient is a 40 yo male with hx of DM (last A1c >10), HTN, HLD presents with a left foot wound. The left foot wound initially started 6 weeks ago when the patient had a screw puncture his sole of his slipper and then suffered a wound on the bottom of the left foot. Patient stated he then when to [facility] and was admitted for 7 days in which he received Unasyn and then was later discharged with PICC line for additional 2-3 weeks of IV unasyn. Patient had wound care nurse come to his home, however started to develop blistering of the left foot and enlargement of the wound on the sole of his foot. Wounds also developed between the left great toe and 2nd toe. Patient reports purulent discharge and pain. Pain is in the left foot, 10/10 burning, constant and worsened with ambulation. Patient reports fevers and chills. Patient also states he has numbness in the 2nd toe on the left side and tingling. Patient denies chest pain, sob or palpitations. Denies syncope, dizziness.     Patient admits to  (-) Fevers, (-) Chills, (-) Nausea, (-) Vomiting, (-) Shortness of Breath (-) calf pain (-) chest pain     Medications acetaminophen     Tablet .. 650 milliGRAM(s) Oral every 6 hours PRN  atorvastatin 40 milliGRAM(s) Oral at bedtime  dextrose 5%. 1000 milliLiter(s) IV Continuous <Continuous>  dextrose 5%. 1000 milliLiter(s) IV Continuous <Continuous>  dextrose 50% Injectable 25 Gram(s) IV Push once  dextrose 50% Injectable 12.5 Gram(s) IV Push once  dextrose 50% Injectable 25 Gram(s) IV Push once  dextrose Oral Gel 15 Gram(s) Oral once PRN  enalapril 10 milliGRAM(s) Oral daily  glucagon  Injectable 1 milliGRAM(s) IntraMuscular once  heparin   Injectable 5000 Unit(s) SubCutaneous every 8 hours  influenza   Vaccine 0.5 milliLiter(s) IntraMuscular once  insulin glargine Injectable (LANTUS) 46 Unit(s) SubCutaneous every morning  insulin lispro (ADMELOG) corrective regimen sliding scale   SubCutaneous three times a day before meals  insulin lispro (ADMELOG) corrective regimen sliding scale   SubCutaneous at bedtime  insulin lispro Injectable (ADMELOG) 14 Unit(s) SubCutaneous three times a day before meals  oxyCODONE    IR 5 milliGRAM(s) Oral every 8 hours PRN  piperacillin/tazobactam IVPB.. 3.375 Gram(s) IV Intermittent every 8 hours  senna 2 Tablet(s) Oral at bedtime  sodium chloride 0.9% lock flush 10 milliLiter(s) IV Push every 1 hour PRN    FHDiabetes mellitus, type 2 (Mother)    ,   PMHSeasonal allergies    Obesity (BMI 30.0-34.9)    Hydrocele, bilateral    HLD (hyperlipidemia)    Hypertension    Diabetes mellitus, type 2    PVD (peripheral vascular disease)    GERD (gastroesophageal reflux disease)    Vitamin D deficiency       PSHNo significant past surgical history      Labs       Vital Signs Last 24 Hrs  T(C): 36.6 (29 Sep 2022 14:55), Max: 36.8 (28 Sep 2022 20:30)  T(F): 97.8 (29 Sep 2022 14:55), Max: 98.2 (28 Sep 2022 20:30)  HR: 98 (29 Sep 2022 14:55) (82 - 98)  BP: 136/89 (29 Sep 2022 14:55) (134/77 - 147/97)  BP(mean): --  RR: 16 (29 Sep 2022 14:55) (16 - 17)  SpO2: 99% (29 Sep 2022 14:55) (99% - 100%)    Parameters below as of 29 Sep 2022 14:55  Patient On (Oxygen Delivery Method): room air      Sedimentation Rate, Erythrocyte: 86 mm/Hr (09-27-22 @ 07:59)  Sedimentation Rate, Erythrocyte: 96 mm/Hr (09-21-22 @ 14:20)         C-Reactive Protein, Serum: 13 mg/L (09-27-22 @ 07:59)  C-Reactive Protein, Serum: 88 mg/L (09-21-22 @ 14:20)       ROS unremarkable outside of HPI      PHYSICAL EXAM  LE Focused:  Pt is A&Ox3 in no acute distress  Left foot focused  Vasc: DP/PT pulses palpable ; CFT < 3 seconds to hallux on left; improved erythema and improved swelling to the left foot. TG: warm to cool. Pedal hair absent. Varicosities absent.   Derm: Left 1st interspace maceration noted with small wound that tunnels to plantar wound measuring .2 x .2cm. Left plantar foot wound sub-2nd and 3rd metatarsal heads extending plantar with macerated edges and granular/adipose wound bed. No purulent drainage noted today; scant amount of serous drainage from interspace wound with healthy bleeding noted. Decreased plantar wound tunneling laterally.  Neuro: Protective sensation absent b/l; light touch sensation diminished b/l   MSK: No POP to left foot wounds       < from: Xray Foot AP + Lateral + Oblique, Left (09.21.22 @ 15:35) >  ACC: [id number] EXAM:  XR FOOT COMP MIN 3 VIEWS LT                          PROCEDURE DATE:  09/21/2022          INTERPRETATION:  LEFT foot    CLINICAL INFORMATION: Infection..    TECHNIQUE: AP,lateral and oblique views.    FINDINGS:  A plantar ulceration with soft tissue swelling of the ball of foot.    The bones and joint spaces are intact.  No fracture or dislocation.  No radiographic evidence of osteomyelitis.    IMPRESSION:    Plantar soft tissue ulceration with soft tissue swelling.  No radiographic evidence of osteomyelitis.  If osteomyelitis is considered  despite conservative therapy, and soft   tissue / bone infection requires further assessment, follow-up MRI   recommended.    --- End of Report ---    < end of copied text >        CULTURES:

## 2022-09-29 NOTE — PROGRESS NOTE ADULT - ASSESSMENT
A:  - DM uncontrolled  - Left plantar foot wound; Left 1st interspace wound   - Left 2nd metatarsal osteomyelitis        P:   Patient evaluated and Chart reviewed   Discussed diagnosis and treatment with patient  Plan for conservative care for osteomyelitis, with long term abx.  Removed wound vac from left foot   Dressed plantar foot wound with iodoform packing, DSD, ACE bandage; betadine paint to the periwound in areas of maceration   Will consider re-application of wound vac in AM depending on maceration   Continue with IV antibiotics As Per ID recc. Pending final ID recs.  Encouraged pt to keep left foot elevated as much as possible  Discussed importance of daily foot examinations and proper shoe gear and to importance of lower Fasting Blood Glucose levels. Patient is at risk for non healing, delayed healing, worsening infection, amputation. Stressed importance of glycemic control and outpatient follow-up. Patient demonstrated verbal understanding.   Podiatry to follow while in house.  Discussed and seen bedside with Attending Dr. Serrano      A:  - DM uncontrolled  - Left plantar foot wound; Left 1st interspace wound   - Left 2nd metatarsal osteomyelitis        P:   Patient evaluated and Chart reviewed   Discussed diagnosis and treatment with patient  Plan for conservative care for osteomyelitis, with long term abx.  Removed wound vac from left foot   Dressed plantar foot wound with iodoform packing, DSD, ACE bandage; betadine paint to the periwound in areas of maceration   Will consider re-application of wound vac in AM depending on maceration; will likely need wound vac for at least 2 weeks outpatient   Continue with IV antibiotics As Per ID recc. Pending final ID recs.  Encouraged pt to keep left foot elevated as much as possible  Discussed importance of daily foot examinations and proper shoe gear and to importance of lower Fasting Blood Glucose levels. Patient is at risk for non healing, delayed healing, worsening infection, amputation. Stressed importance of glycemic control and outpatient follow-up. Patient demonstrated verbal understanding.   Podiatry to follow while in house.  Discussed and seen bedside with Attending Dr. Serrano      A:  - DM uncontrolled  - Left plantar foot wound; Left 1st interspace wound, abscess  - Left 2nd metatarsal osteomyelitis        P:   Patient evaluated and Chart reviewed   Discussed diagnosis and treatment with patient  Plan for conservative care for osteomyelitis, with long term abx.  Removed wound vac from left foot   Dressed plantar foot wound with iodoform packing, DSD, ACE bandage; betadine paint to the periwound in areas of maceration   Will consider re-application of wound vac in AM depending on maceration; will likely need wound vac for at least 2 weeks outpatient   Continue with IV antibiotics As Per ID recc. Pending final ID recs.  Encouraged pt to keep left foot elevated as much as possible  Discussed importance of daily foot examinations and proper shoe gear and to importance of lower Fasting Blood Glucose levels, keep FS <180 for optimal wound healing. Patient is at risk for non healing, delayed healing, worsening infection, amputation. Stressed importance of glycemic control and outpatient follow-up. Patient demonstrated verbal understanding.   Podiatry to follow while in house.  Discussed and seen bedside with Attending Dr. Serrano

## 2022-09-29 NOTE — PROGRESS NOTE ADULT - ATTENDING COMMENTS
Left submet 2 wound communicating with 1st interspace wound with cellulitis and purulent drainage, fibrotic/ necrotic fat base. Recommend surgical debridement, pending MRI to evaluate for osteomyelitis as there is high clinical suspicion. Will continue local wound care, IV abx meanwhile.
left foot s/p I&D, wound debridement and bone biopsy.  continue IV abx per ID, recs appreciated.  will reapply tomorrow, plan for outpatient vac and follow-up

## 2022-09-29 NOTE — PROGRESS NOTE ADULT - PROBLEM SELECTOR PLAN 3
Normotensive  Resume home Vasotec with parameters  Monitor BP Uncontrolled  a1c 12.3  Lantus decreased to 46U  Admelog decreased to 14U TID   Continue sliding scale insulin coverage  Continue glucose monitoring  Continue low carb diet   Dr. Marr, Endocrine, follows

## 2022-09-29 NOTE — PROGRESS NOTE ADULT - PROBLEM SELECTOR PLAN 1
MRI noted above  POD2 I&D  Podiatry placed wound vac on 9/27  C/w NWB to left foot  Continue Zosyn (day 2)  Blood cultures negative  Tissue cultures testing  Podiatry following   RD Dye, following  Recommendations appreciated confirmed on MRI   s/p I&D 9/26  surgical pathology with clear margins   Podiatry it is felt the need of 6 weeks long course of abx for DFO despite 2nd MTP head bone bx negative, there are also areas of marrow edema 1st, 2nd and 3rd MTP and the bone was soft during surgery  IV Zosyn until Nov 7th 2022  NCM to follow for home services  podiatry follows  RD Stratton

## 2022-09-29 NOTE — PROGRESS NOTE ADULT - SUBJECTIVE AND OBJECTIVE BOX
-*-*-* INCOMPLETE  NP Note discussed with  primary attending    Patient is a 41y old  Male who presents with a chief complaint of Left foot wound r/o Osteomyelitis (29 Sep 2022 14:10)    INTERVAL HPI/OVERNIGHT EVENTS: no acute medical events overnight- refused AM labs    MEDICATIONS  (STANDING):  atorvastatin 40 milliGRAM(s) Oral at bedtime  dextrose 5%. 1000 milliLiter(s) (50 mL/Hr) IV Continuous <Continuous>  dextrose 5%. 1000 milliLiter(s) (100 mL/Hr) IV Continuous <Continuous>  dextrose 50% Injectable 12.5 Gram(s) IV Push once  dextrose 50% Injectable 25 Gram(s) IV Push once  dextrose 50% Injectable 25 Gram(s) IV Push once  enalapril 10 milliGRAM(s) Oral daily  glucagon  Injectable 1 milliGRAM(s) IntraMuscular once  influenza   Vaccine 0.5 milliLiter(s) IntraMuscular once  insulin glargine Injectable (LANTUS) 46 Unit(s) SubCutaneous every morning  insulin lispro (ADMELOG) corrective regimen sliding scale   SubCutaneous three times a day before meals  insulin lispro (ADMELOG) corrective regimen sliding scale   SubCutaneous at bedtime  insulin lispro Injectable (ADMELOG) 14 Unit(s) SubCutaneous three times a day before meals  piperacillin/tazobactam IVPB.. 3.375 Gram(s) IV Intermittent every 8 hours  senna 2 Tablet(s) Oral at bedtime    MEDICATIONS  (PRN):  acetaminophen     Tablet .. 650 milliGRAM(s) Oral every 6 hours PRN Temp greater or equal to 38C (100.4F), Mild Pain (1 - 3)  dextrose Oral Gel 15 Gram(s) Oral once PRN Blood Glucose LESS THAN 70 milliGRAM(s)/deciliter  oxyCODONE    IR 5 milliGRAM(s) Oral every 8 hours PRN Moderate Pain (4 - 6)  sodium chloride 0.9% lock flush 10 milliLiter(s) IV Push every 1 hour PRN Pre/post blood products, medications, blood draw, and to maintain line patency      __________________________________________________  REVIEW OF SYSTEMS:    CONSTITUTIONAL: No fever,   EYES: no acute visual disturbances  NECK: No pain or stiffness  RESPIRATORY: No cough; No shortness of breath  CARDIOVASCULAR: No chest pain, no palpitations  GASTROINTESTINAL: No pain. No nausea or vomiting; No diarrhea   NEUROLOGICAL: No headache or numbness, no tremors  MUSCULOSKELETAL: No joint pain, no muscle pain  GENITOURINARY: no dysuria, no frequency, no hesitancy  PSYCHIATRY: no depression , no anxiety  ALL OTHER  ROS negative        Vital Signs Last 24 Hrs  T(C): 36.6 (29 Sep 2022 14:55), Max: 36.8 (28 Sep 2022 20:30)  T(F): 97.8 (29 Sep 2022 14:55), Max: 98.2 (28 Sep 2022 20:30)  HR: 98 (29 Sep 2022 14:55) (82 - 98)  BP: 136/89 (29 Sep 2022 14:55) (134/77 - 147/97)  BP(mean): --  RR: 16 (29 Sep 2022 14:55) (16 - 17)  SpO2: 99% (29 Sep 2022 14:55) (99% - 100%)    Parameters below as of 29 Sep 2022 14:55  Patient On (Oxygen Delivery Method): room air        ________________________________________________  PHYSICAL EXAM:  GENERAL: NAD  HEENT: Normocephalic;  conjunctivae and sclerae clear  NECK : supple  CHEST/LUNG: Clear to ausculitation bilaterally with good air entry   HEART: S1 S2  regular; no murmurs, gallops or rubs  ABDOMEN: Soft, Nontender, Nondistended; Bowel sounds present  EXTREMITIES:  L foot with wound VAC, post OP changes, erythema and swelling extending to Leg resolved, swelling localized to wound area but improved.   SKIN: warm and dry; no rash  NERVOUS SYSTEM:  Awake and alert; Oriented  to place, person and time  _________________________________________________  LABS:      CAPILLARY BLOOD GLUCOSE      POCT Blood Glucose.: 267 mg/dL (29 Sep 2022 11:34)  POCT Blood Glucose.: 114 mg/dL (29 Sep 2022 07:58)  POCT Blood Glucose.: 100 mg/dL (28 Sep 2022 21:00)  POCT Blood Glucose.: 174 mg/dL (28 Sep 2022 16:52)        RADIOLOGY & ADDITIONAL TESTS:   < from: MR Foot No Cont, Left (09.23.22 @ 11:28) >  IMPRESSION: Wound along the plantar aspect of the foot at the level of   the second MPJ. This communicates with a multiloculated collection that   is present in the first metatarsal space and dorsal soft tissues   overlying the second digit, consistent with an abscess. There is also a   smaller collection in the dorsal soft tissues overlying the third digit.  Areas of marrow signal alteration within the second digit metatarsal,   second digit proximal phalanx, the third digit proximal phalanx, the head   of the third metatarsal, and the head of the first metatarsal.   Differential diagnosis for the signal alteration at these sites includes   osteomyelitis and stress reaction.    --- End of Report ---    < end of copied text >  < from: VA Physiol Extremity Lower 3+ Level, BI (09.21.22 @ 20:21) >  Findings/  Impression:  Right lower extremity: The ankle brachial index is 1.36. The pulse   waveforms are pulsatile but slightly diminished in amplitude.    Left lower extremity: The ankle brachial index is 1.29. The pulse   waveforms are diminished in the digit.    Diffusely calcified vessels limiting evaluation.  Small vessel disease suggested in the left foot.    --- End of Report ---    < end of copied text >  < from: Xray Foot AP + Lateral + Oblique, Left (09.21.22 @ 15:35) >  IMPRESSION:    Plantar soft tissue ulceration with soft tissue swelling.  No radiographic evidence of osteomyelitis.  If osteomyelitis is considered  despite conservative therapy, and soft   tissue / bone infection requires further assessment, follow-up MRI   recommended.    --- End of Report ---        < end of copied text >    < from: US Duplex Venous Lower Ext Ltd, Left (09.21.22 @ 15:22) >  IMPRESSION:  No evidence of left lower extremity deep venous thrombosis.          < end of copied text >  Imaging Personally Reviewed:  YES    Consultant(s) Notes Reviewed:   YES    Care Discussed with Consultants : IR     Plan of care was discussed with patient and /or primary care giver; all questions and concerns were addressed and care was aligned with patient's wishes.

## 2022-09-29 NOTE — PROGRESS NOTE ADULT - ASSESSMENT
41 year old male with past medical history of DM (last A1c >10), HTN, HLD who p/w left foot wound. Of note was admitted for 7 days at [facility] in which he received Unasyn and then was later discharged with a PICC line for additional 2-3 weeks of IV Unasyn, but wound started to have purulent discharge and pain also reported fevers and chills.  Admitted for Left Foot Open Wound. MRI confirmed osteomyelitis. Podiatry and Vascular consulted. No vascular intervention required at this time. ID Dr. Stratton consulted, on IV Zosyn. s/p left foot wound debridement on 09/26. OR cultures pending 41 year old male with past medical history of DM (last A1c >10), HTN, HLD who p/w left foot wound. Of note was admitted for 7 days at [facility] in which he received Unasyn and then was later discharged with a PICC line for additional 2-3 weeks of IV Unasyn, but wound started to have purulent discharge andreported fevers and chills.  Admitted to medicine for Left diabetic foot ulcer,  MRI confirmed osteomyelitis. RD Stratton followed started on IV Zosyn.   Vascular followed and found No acute vascular intervention required at this time.   s/p left foot wound debridement on 09/26 with podiatry, reccomended to continue IV Zosyn x6 weeks + wound vacc. PICC line placed 9/29, pending home care services [facility] to follow

## 2022-09-29 NOTE — PROGRESS NOTE ADULT - PROBLEM SELECTOR PLAN 6
Pt will be medically stable for discharge when a definitive ABX plan has been established DVT PPX: Heparin

## 2022-09-29 NOTE — PROGRESS NOTE ADULT - ASSESSMENT
Subjective: NAD, afebrile, L foot w/wound VAC, no healing, post OP changes, pain controlled.     REVIEW OF SYSTEMS:  CONSTITUTIONAL:  No weakness, fevers or chills  EYES/ENT:  No visual changes;  No vertigo or throat pain   NECK:  No pain or stiffness  RESPIRATORY:  No cough, wheezing, hemoptysis; No shortness of breath  CARDIOVASCULAR:  No chest pain or palpitations  GASTROINTESTINAL:  No abdominal or epigastric pain. No nausea, vomiting, or hematemesis; No diarrhea or constipation. No melena or hematochezia.  GENITOURINARY:  No dysuria, frequency or hematuria  NEUROLOGICAL:  No numbness or weakness  MSK: no back pain, L foot pain controlled, + wound VAC    PHYSICAL EXAM:   Vital Signs Last 24 Hrs  T(C): 36.7 (29 Sep 2022 05:42), Max: 36.9 (28 Sep 2022 14:41)  T(F): 98 (29 Sep 2022 05:42), Max: 98.5 (28 Sep 2022 14:41)  HR: 82 (29 Sep 2022 05:42) (82 - 98)  BP: 134/77 (29 Sep 2022 05:42) (134/77 - 147/97)  RR: 17 (29 Sep 2022 05:42) (16 - 18)  SpO2: 99% (29 Sep 2022 05:42) (97% - 100%)    Parameters below as of 29 Sep 2022 05:42 Patient On (Oxygen Delivery Method): room air    GENERAL: NAD, non-toxic, pleasant, lying in bed comfortably  HEAD:  Atraumatic, Normocephalic  EYES: EOMI, PERRLA, conjunctiva and sclera clear  ENT: Moist mucous membranes  NECK: Supple, No JVD  CHEST/LUNG: Clear to auscultation bilaterally; No rales, rhonchi, wheezing, or rubs. Unlabored respirations  HEART: Regular rate and rhythm; No murmurs, rubs, or gallops  ABDOMEN: BSx4; Soft, nontender, nondistended  : no dysuria  EXTREMITIES:  2+ Peripheral Pulses, brisk capillary refill. No clubbing, cyanosis, or edema  NERVOUS SYSTEM:  A&Ox3. No sensory deficits, no motor deficits  SKIN: No rashes or lesions, dry and warm skin  MSK: no back pain, L foot with wound VAC, post OP changes, erythema and swelling extending to Leg resolved, swelling localized to wound area but improved.     LABS/DIAGNOSTIC TESTS:  WBC Count: 7.56 K/uL (09-27 @ 07:59)  C-Reactive Protein, Serum: 13 mg/L (09-27-22 @ 07:59)  from 88 mg/L (09.21.22 @ 14:20)  Sedimentation Rate, Erythrocyte: 86 mm/Hr (09.27.22 @ 07:59)    CULTURES:   Culture - Tissue with Gram Stain (collected 09-26-22 @ 21:50)  Source: .Tissue Left 2nd metatarsal bone  Gram Stain (09-27-22 @ 03:24):    No polymorphonuclear leukocytes seen per low power field    No organisms seen per oil power field  Preliminary Report (09-28-22 @ 07:13):    No growth    Culture - Surgical Swab (collected 09-22-22 @ 07:22)  Source: .Surgical Swab L. foot wound  Final Report (09-25-22 @ 14:59):    Few Enterobacter cloacae complex    Few Enterococcus faecalis    Few Bacteroides fragilis "Susceptibilities not performed"    Few Finegoldia magna "Susceptibilities not performed"    Few Streptococcus agalactiae (Group B) isolated    Group B streptococci aresusceptible to ampicillin,    penicillin and cefazolin, but may be resistant to    erythromycin and clindamycin.    Recommendations for intrapartum prophylaxis for Group B    streptococci are penicillin or ampicillin.    Normal skin elmer isolated  Organism: Enterobacter cloacae complex  Enterococcus faecalis (09-25-22 @ 14:59)  Organism: Enterococcus faecalis (09-25-22 @ 14:59)      -  Ampicillin: S <=2 Predicts results to ampicillin/sulbactam, amoxacillin-clavulanate and  piperacillin-tazobactam.      -  Tetra/Doxy: R >8      -  Vancomycin: S 2      Method Type: JEFF  Organism: Enterobacter cloacae complex (09-25-22 @ 14:59)      -  Amikacin: S <=16      -  Amoxicillin/Clavulanic Acid: R >16/8      -  Ampicillin: R >16 These ampicillin results predict results for amoxicillin      -  Ampicillin/Sulbactam: R >16/8 Enterobacter, Klebsiella aerogenes, Citrobacter, and Serratia may develop resistance during prolonged therapy (3-4 days)      -  Aztreonam: S <=4      -  Cefazolin: R >16 Enterobacter, Klebsiella aerogenes, Citrobacter, and Serratia may develop resistance during prolonged therapy (3-4 days)      -  Cefepime: S <=2      -  Cefoxitin: R >16      -  Ceftriaxone: R 8 Enterobacter, Klebsiella aerogenes, Citrobacter, and Serratia may develop resistance during prolonged therapy      -  Ciprofloxacin: S <=0.25      -  Ertapenem: S <=0.5      -  Gentamicin: S <=2      -  Imipenem: S <=1      -  Levofloxacin: S <=0.5      -  Meropenem: S <=1      -  Piperacillin/Tazobactam: S <=8      -  Tobramycin: S <=2      -  Trimethoprim/Sulfamethoxazole: S <=0.5/9.5      Method Type: JEFF    Culture - Blood (collected 09-21-22 @ 14:20)  Source: .Blood Blood-Peripheral  Final Report (09-26-22 @ 22:00):    No Growth Final    Culture - Blood (collected 09-21-22 @ 14:10)  Source: .Blood Blood-Peripheral  Final Report (09-26-22 @ 22:00):    No Growth Final    Rapid RVP Result: NotDetec (09-26 @ 01:30)    RADIOLOGY: < from: MR Foot No Cont, Left (09.23.22 @ 11:28) >  IMPRESSION: Wound along the plantar aspect of the foot at the level of the second MPJ. This communicates with a multiloculated collection that is present in the first metatarsal space and dorsal soft tissues overlying the second digit, consistent with an abscess. There is also a smaller collection in the dorsal soft tissues overlying the third digit. Areas of marrow signal alteration within the second digit metatarsal, second digit proximal phalanx, the third digit proximal phalanx, the head of the third metatarsal, and the head of the first metatarsal. Differential diagnosis for the signal alteration at these sites includes osteomyelitis and stress reaction.    ANTIBIOTICS: piperacillin/tazobactam IVPB.. 3.375 every 8 hours 4 hrs infusion(9/21-  )    IV LINES:pIV    IMPRESSION AND PLAN:  40 yo male from [facility] with hx of DM (last A1c >10), HTN, HLD who presented for infected left foot sole ulcer previously I&D at [facility] last month and tx with IV abx for ~ 2-3 weeks total(Unasyn).  CRP 88-->13  Normal WBC  IntraOP Tissue cx NGTD(9/26)    9/26 S/P I&D of necrotic tissue of L plantar wound bed/edges and L 1st interspace. Left second metatarsal head debrided, Biopsy obtained with Jamshidi needle(L 2nd MTP head)    -DFI (SSTI) vs DFO after puncture trauma to L foot sole w/polymicrobial grow in cultures (Eterococcusfaecalis, Enterobacter cloacae complex, Bacteroides fragilis, Finegoldia magna, Streptococcus agalactiae (Group B). MRI L foot w/ multiloculated collection that is present in the first metatarsal space and dorsal soft tissues overlying the second digit, consistent with an abscess. S/P I&D. 2nd MTP head BX and wound VAC on 9/26 by Podiatry.   -Uncontrolled DM.  -Small vessel disease/ calcified vessels L foot(ADRYAN/PVR).    After discussing intraOP findings with Podiatry it is felt the need of 6 weeks long course of abx for DFO despite 2nd MTP head bone bx negative, there are also areas of marrow edema 1st, 2nd and 3rd MTP and the bone was soft during surgery.  He is doing well on Zosyn which covers polymicrobial growth encountered on surgical swab on admission, as per pt DC summary from [facility] he also had wound cultures + Bacteroides, enterococcus faecalis and GBS. Tissue cx NGTD however he was on zosyn 5 days before surgery.   Inflammatory markers trending down, afebrile, clinically stable, no WBC elevation, toleration abx with no SE.    Continue Zosyn 3.75g q8 hrs IV 4 hrs infusion for total 6 weeks of therapy(9/26- 11/14)  Surveillance labs while on abx CBC with Diff, CMP, ESR and CRP weekly  F/up Podiatry  Wound VAC care and supplies  Follow up OP with ID, he was seeing Dr. Jorje Astorga @ [address] [facility] Tel.[phone] or he can be referred to ID clinic @ [facility] @ [facility]: [address] [place] [address]  Tel: [phone]  Discussed with pt at length   Discussed with Medicine attending and NP  ID will sign off    Please reach ID with any questions or concerns  Dr. Natalie Wilhelm  Tel. [phone]  Available in Teams

## 2022-09-29 NOTE — PROGRESS NOTE ADULT - SUBJECTIVE AND OBJECTIVE BOX
Subjective:  Chart Notes, Work list Manager, and fingersticks reviewed. Patient reports feeling better, denies any major complaints. His appetite is excellent and finishing all his meals.     Medications ( Standing):  atorvastatin 40 milliGRAM(s) Oral at bedtime  dextrose 5%. 1000 milliLiter(s) (50 mL/Hr) IV Continuous <Continuous>  dextrose 5%. 1000 milliLiter(s) (100 mL/Hr) IV Continuous <Continuous>  dextrose 50% Injectable 12.5 Gram(s) IV Push once  dextrose 50% Injectable 25 Gram(s) IV Push once  dextrose 50% Injectable 25 Gram(s) IV Push once  enalapril 10 milliGRAM(s) Oral daily  glucagon  Injectable 1 milliGRAM(s) IntraMuscular once  influenza   Vaccine 0.5 milliLiter(s) IntraMuscular once  insulin glargine Injectable (LANTUS) 46 Unit(s) SubCutaneous every morning  insulin lispro (ADMELOG) corrective regimen sliding scale   SubCutaneous three times a day before meals  insulin lispro (ADMELOG) corrective regimen sliding scale   SubCutaneous at bedtime  insulin lispro Injectable (ADMELOG) 14 Unit(s) SubCutaneous three times a day before meals  piperacillin/tazobactam IVPB.. 3.375 Gram(s) IV Intermittent every 8 hours  senna 2 Tablet(s) Oral at bedtime    Medications (PRN):  acetaminophen     Tablet .. 650 milliGRAM(s) Oral every 6 hours PRN Temp greater or equal to 38C (100.4F), Mild Pain (1 - 3)  dextrose Oral Gel 15 Gram(s) Oral once PRN Blood Glucose LESS THAN 70 milliGRAM(s)/deciliter  oxyCODONE    IR 5 milliGRAM(s) Oral every 8 hours PRN Moderate Pain (4 - 6)    Review of Systems:  Constitutional: No fever  Eyes: No blurry vision  HEENT: No pain  Cardiovascular: No chest pain, palpitations  Respiratory: No SOB, no cough  GI: No nausea, vomiting, abdominal pain  : No dysuria  Skin: no rash  Psych: no depression  Extremities: No pain in Left foot  Endocrine: no polyuria, polydipsia    Physical Exam:  VITALS: T(C): 36.7 (09-29-22 @ 05:42)  T(F): 98 (09-29-22 @ 05:42), Max: 98.5 (09-28-22 @ 14:41)  HR: 82 (09-29-22 @ 05:42) (82 - 98)  BP: 134/77 (09-29-22 @ 05:42) (134/77 - 147/97)  RR:  (16 - 18)  SpO2:  (97% - 100%)    Constitutional: Not in acute distress, non- ill- appearing, obese   HEENT: Moist mucous membranes  Neck:  No JVD, bruits or thyromegaly, No thyroid nodules palpable, no LAD  Respiratory:  Respiratory effort normal, lungs clear to ausculation, without rales or rhonchi  Cardiovascular:  Regular heart rate, normal S1 and S2 sounds, without murmur, rub or gallop.  Gastrointestinal: Soft, non tender without hepatosplenomegaly and masses, mild +ve abdominal obesity  Extremities:  Bandage on the left foot present. Sensation intact in R feet, no cyanosis, clubbing or edema, positive pedal pulses +ve in right foot. Venous stasis and varicose veins +ve in lower extremities.  Neurological:  Oriented to person, place and time    Capillary Blood Glucose:  POCT Blood Glucose.: 267 mg/dL (29 Sep 2022 11:34)  POCT Blood Glucose.: 114 mg/dL (29 Sep 2022 07:58)  POCT Blood Glucose.: 100 mg/dL (28 Sep 2022 21:00)  POCT Blood Glucose.: 174 mg/dL (28 Sep 2022 16:52)    09-27  136  |  102  |  15  ----------------------------<  262<H>  4.0   |  26  |  0.87  eGFR: 110  Ca    9.4      09-27    A1C with Estimated Average Glucose Result: 12.3  Islet Cell Antibody: <1:4:   Glutamic Acid Decarboxylase Antibody: 0.00 (09.23.22 @ 05:08)    Lipid Profile (09.27.22 @ 07:59)    Cholesterol, Serum: 138 mg/dL    Triglycerides, Serum: 84 mg/dL    HDL Cholesterol, Serum: 41 mg/dL    Non HDL Cholesterol: 97: Patients Atherosclerotic Cardiovascular Disease (ASCVD) Risk  Optimal Level (mg/dL)  LDL Cholesterol (LDL-C)  All Patients                                < 100  ASCVD at Very High Risk1    < 70  Non-HDL Cholesterol (Non-HDL-C)  All Patients                       < 130  ASCVD at Very High Risk1   < 100    Assessment and Plan:  41 year old male with hx of T2DM, HTN, HLD, Obesity admitted for Left plantar foot wound, Left 1st interspace wound , Left 2nd metatarsal osteomyelitis s/p excisional debridement ulcer of left foot on 9/26. Endocrinology is following for glycemic management.     1) Poorly Controlled Type 2 Diabetes Mellitus:  HBA1C above goal 12.3%  [id number] and Anti-islet cell antibodies are negative, so patient most likely has type 2 diabetes.   In hospital, Now the fasting and postprandial Blood sugars are tightly controlled after IV antibiotics has been premixed in normal saline solutions. Therefore will reduce basal and bolus insulin as below  Patient has good appetite    Inpatient Recommendations:  Basal Insulin:   Decrease Glargine ( Lantus) 46 units in the morning    Nutritional Insulin:  Decrease Lispro (Admelog) to 14 units with meals, (Hold if NPO or eating <50% of meals)    Correctional Insulin:  Continue Moderate Lispro (Admelog) correctional scale with meals and bedtime.     Oral Diabetes Medications:  None in the hospital    Discharge Recommendations:  Final DC recs pending but will plan to discharge on Lantus once daily, Lispro with meals and Metformin ( if renal function remains intact). Will not resume Glipizide or Januvia on discharge    2) Hyperlipidemia:  Lipid profile noted. LDL is at goal     Recommendations:  Continue Atorvastatin 40 mg daily    Plan discussed in detail with patient and primary team    Carisa Marr MD   Endocrinology, Diabetes and Metabolism   Available on MS. teams

## 2022-09-29 NOTE — PROGRESS NOTE ADULT - PROBLEM SELECTOR PLAN 2
Uncontrolled  Continue Lantus coverage QHS  Continue sliding scale insulin coverage  Continue glucose monitoring  Continue low carb diet   Dr. Marr, Endocrine, following  Recommendations appreciated wound vacc recommendations pending   plan as above

## 2022-09-29 NOTE — PROGRESS NOTE ADULT - PROBLEM SELECTOR PLAN 7
Plan to continue IV Zosyn until 11/7/22  will need wound vacc (F/u podiatry reccs)  NCM to follow for home care services

## 2022-09-30 DIAGNOSIS — L97.524 NON-PRESSURE CHRONIC ULCER OF OTHER PART OF LEFT FOOT WITH NECROSIS OF BONE: ICD-10-CM

## 2022-09-30 LAB
GLUCOSE BLDC GLUCOMTR-MCNC: 195 MG/DL — HIGH (ref 70–99)
GLUCOSE BLDC GLUCOMTR-MCNC: 261 MG/DL — HIGH (ref 70–99)
GLUCOSE BLDC GLUCOMTR-MCNC: 264 MG/DL — HIGH (ref 70–99)
GLUCOSE BLDC GLUCOMTR-MCNC: 88 MG/DL — SIGNIFICANT CHANGE UP (ref 70–99)

## 2022-09-30 PROCEDURE — 99232 SBSQ HOSP IP/OBS MODERATE 35: CPT

## 2022-09-30 PROCEDURE — 99233 SBSQ HOSP IP/OBS HIGH 50: CPT | Mod: GC

## 2022-09-30 RX ORDER — VANCOMYCIN HCL 1 G
1250 VIAL (EA) INTRAVENOUS ONCE
Refills: 0 | Status: COMPLETED | OUTPATIENT
Start: 2022-09-30 | End: 2022-09-30

## 2022-09-30 RX ORDER — INSULIN LISPRO 100/ML
16 VIAL (ML) SUBCUTANEOUS
Refills: 0 | Status: DISCONTINUED | OUTPATIENT
Start: 2022-09-30 | End: 2022-10-04

## 2022-09-30 RX ORDER — INSULIN GLARGINE 100 [IU]/ML
6 INJECTION, SOLUTION SUBCUTANEOUS ONCE
Refills: 0 | Status: COMPLETED | OUTPATIENT
Start: 2022-09-30 | End: 2022-09-30

## 2022-09-30 RX ORDER — INSULIN GLARGINE 100 [IU]/ML
52 INJECTION, SOLUTION SUBCUTANEOUS EVERY MORNING
Refills: 0 | Status: DISCONTINUED | OUTPATIENT
Start: 2022-10-01 | End: 2022-10-04

## 2022-09-30 RX ORDER — KETOROLAC TROMETHAMINE 30 MG/ML
15 SYRINGE (ML) INJECTION ONCE
Refills: 0 | Status: DISCONTINUED | OUTPATIENT
Start: 2022-09-30 | End: 2022-09-30

## 2022-09-30 RX ADMIN — CHLORHEXIDINE GLUCONATE 1 APPLICATION(S): 213 SOLUTION TOPICAL at 21:39

## 2022-09-30 RX ADMIN — Medication 15 MILLIGRAM(S): at 21:50

## 2022-09-30 RX ADMIN — HEPARIN SODIUM 5000 UNIT(S): 5000 INJECTION INTRAVENOUS; SUBCUTANEOUS at 12:04

## 2022-09-30 RX ADMIN — SENNA PLUS 2 TABLET(S): 8.6 TABLET ORAL at 21:13

## 2022-09-30 RX ADMIN — HEPARIN SODIUM 5000 UNIT(S): 5000 INJECTION INTRAVENOUS; SUBCUTANEOUS at 05:26

## 2022-09-30 RX ADMIN — PIPERACILLIN AND TAZOBACTAM 25 GRAM(S): 4; .5 INJECTION, POWDER, LYOPHILIZED, FOR SOLUTION INTRAVENOUS at 16:02

## 2022-09-30 RX ADMIN — INSULIN GLARGINE 6 UNIT(S): 100 INJECTION, SOLUTION SUBCUTANEOUS at 12:22

## 2022-09-30 RX ADMIN — Medication 2: at 12:02

## 2022-09-30 RX ADMIN — ATORVASTATIN CALCIUM 40 MILLIGRAM(S): 80 TABLET, FILM COATED ORAL at 21:16

## 2022-09-30 RX ADMIN — Medication 166.67 MILLIGRAM(S): at 21:14

## 2022-09-30 RX ADMIN — HEPARIN SODIUM 5000 UNIT(S): 5000 INJECTION INTRAVENOUS; SUBCUTANEOUS at 21:13

## 2022-09-30 RX ADMIN — PIPERACILLIN AND TAZOBACTAM 25 GRAM(S): 4; .5 INJECTION, POWDER, LYOPHILIZED, FOR SOLUTION INTRAVENOUS at 21:17

## 2022-09-30 RX ADMIN — Medication 14 UNIT(S): at 08:26

## 2022-09-30 RX ADMIN — PIPERACILLIN AND TAZOBACTAM 25 GRAM(S): 4; .5 INJECTION, POWDER, LYOPHILIZED, FOR SOLUTION INTRAVENOUS at 05:26

## 2022-09-30 RX ADMIN — Medication 6: at 08:25

## 2022-09-30 RX ADMIN — Medication 2: at 22:25

## 2022-09-30 RX ADMIN — Medication 16 UNIT(S): at 12:03

## 2022-09-30 RX ADMIN — Medication 10 MILLIGRAM(S): at 05:25

## 2022-09-30 RX ADMIN — Medication 15 MILLIGRAM(S): at 21:22

## 2022-09-30 RX ADMIN — INSULIN GLARGINE 46 UNIT(S): 100 INJECTION, SOLUTION SUBCUTANEOUS at 08:27

## 2022-09-30 NOTE — PROGRESS NOTE ADULT - ASSESSMENT
A:  - DM uncontrolled  - Left plantar foot wound; Left 1st interspace wound, abscess  - Left 2nd metatarsal osteomyelitis        P:   Patient evaluated and Chart reviewed   Discussed diagnosis and treatment with patient  Plan for conservative care for osteomyelitis, with long term abx.  Re-applied wound vac to left foot with bridging to dorsum of foot until seal; dressed with DSD, ACE compression  Applied betadine paint to 1st interspace with mild maceration prior to applying wound vac  Pt to be d/c w/ wound vac pending authorization   Continue with IV antibiotics As Per ID recc; pt to be on zosyn for 6 weeks via picc line   Encouraged pt to keep left foot elevated as much as possible  Discussed importance of daily foot examinations and proper shoe gear and to importance of lower Fasting Blood Glucose levels, keep FS <180 for optimal wound healing. Patient is at risk for non healing, delayed healing, worsening infection, amputation. Stressed importance of glycemic control and outpatient follow-up. Patient demonstrated verbal understanding.   Podiatry to follow while in house.  Discussed with Attending Dr. Serrano      A:  - DM uncontrolled  - Left plantar foot wound; Left 1st interspace wound, abscess  - Left 2nd metatarsal osteomyelitis        P:   Patient evaluated and Chart reviewed   Discussed diagnosis and treatment with patient  Plan for conservative care for osteomyelitis, with long term abx.  Re-applied wound vac to left foot with bridging to dorsum of foot until seal; dressed with DSD, ACE compression  Applied betadine paint to 1st interspace with mild maceration prior to applying wound vac  Pt to be d/c w/ wound vac pending authorization   Continue with IV antibiotics As Per ID recc; pt to be on zosyn for 6 weeks via picc line   Encouraged pt to keep left foot elevated as much as possible  Pt to f/o at [address] [facility], [id number] phone [phone] w/ Dr. Serrano within 1 week of d/c   Discussed importance of daily foot examinations and proper shoe gear and to importance of lower Fasting Blood Glucose levels, keep FS <180 for optimal wound healing. Patient is at risk for non healing, delayed healing, worsening infection, amputation. Stressed importance of glycemic control and outpatient follow-up. Patient demonstrated verbal understanding.   Podiatry to follow while in house.  Discussed with Attending Dr. Serrano

## 2022-09-30 NOTE — PROGRESS NOTE ADULT - PROBLEM SELECTOR PLAN 1
confirmed on MRI   s/p I&D 9/26  surgical pathology with clear margins   Podiatry it is felt the need of 6 weeks long course of abx for DFO despite 2nd MTP head bone bx negative, there are also areas of marrow edema 1st, 2nd and 3rd MTP and the bone was soft during surgery  IV Zosyn until Nov 7th 2022  NCM to follow for home services  podiatry follows  RD Stratton

## 2022-09-30 NOTE — PROGRESS NOTE ADULT - SUBJECTIVE AND OBJECTIVE BOX
Podiatry Interval: Pt is seen bedside in no acute distress. Pt is in a droplet precaution exposed room. Denies pain to left foot and states he hasn't been walking. Denies any overnight acute events. Denies further constitutional symptoms. Denies further pedal complaints.     Podiatry HPI: Pt is a 41M who presents to ED with his girlfriend with a chief complaint of worsening left foot wound. Pt states about 1.5-2 months prior, he was walking in work boots when he stepped on a metal screw. Pt admits he didn't feel it initially due to his neuropathy from diabetes and it wasn't until he noticed blood that he saw a wound. Following this, pt arrived at [facility] where he was admitted for 7 days where they did a bedside I&D and given Unasyn and later discharged on IV picc line with more unasyn. Pt states he previously had a home health nurse changing his foot dressings every other day. Admits he noticed a blistering that worsened to bottom of left foot and that he had a lot of drainage to left 1st interspace with pinpoint wound. Pt states today, he experienced chills and burning pain that worsened when walking. Denies further constitutional symptoms. Denies recent acute trauma. Denies further pedal complaints.     Patient is a 41y old  Male who presents with a chief complaint of Left foot wound r/o Osteomyelitis (21 Sep 2022 18:46)      HPI:  Patient is a 40 yo male with hx of DM (last A1c >10), HTN, HLD presents with a left foot wound. The left foot wound initially started 6 weeks ago when the patient had a screw puncture his sole of his slipper and then suffered a wound on the bottom of the left foot. Patient stated he then when to [facility] and was admitted for 7 days in which he received Unasyn and then was later discharged with PICC line for additional 2-3 weeks of IV unasyn. Patient had wound care nurse come to his home, however started to develop blistering of the left foot and enlargement of the wound on the sole of his foot. Wounds also developed between the left great toe and 2nd toe. Patient reports purulent discharge and pain. Pain is in the left foot, 10/10 burning, constant and worsened with ambulation. Patient reports fevers and chills. Patient also states he has numbness in the 2nd toe on the left side and tingling. Patient denies chest pain, sob or palpitations. Denies syncope, dizziness.     Patient admits to  (-) Fevers, (-) Chills, (-) Nausea, (-) Vomiting, (-) Shortness of Breath (-) calf pain (-) chest pain     Medications acetaminophen     Tablet .. 650 milliGRAM(s) Oral every 6 hours PRN  atorvastatin 40 milliGRAM(s) Oral at bedtime  chlorhexidine 2% Cloths 1 Application(s) Topical daily  dextrose 5%. 1000 milliLiter(s) IV Continuous <Continuous>  dextrose 5%. 1000 milliLiter(s) IV Continuous <Continuous>  dextrose 50% Injectable 25 Gram(s) IV Push once  dextrose 50% Injectable 12.5 Gram(s) IV Push once  dextrose 50% Injectable 25 Gram(s) IV Push once  dextrose Oral Gel 15 Gram(s) Oral once PRN  enalapril 10 milliGRAM(s) Oral daily  glucagon  Injectable 1 milliGRAM(s) IntraMuscular once  heparin   Injectable 5000 Unit(s) SubCutaneous every 8 hours  influenza   Vaccine 0.5 milliLiter(s) IntraMuscular once  insulin lispro (ADMELOG) corrective regimen sliding scale   SubCutaneous three times a day before meals  insulin lispro (ADMELOG) corrective regimen sliding scale   SubCutaneous at bedtime  insulin lispro Injectable (ADMELOG) 16 Unit(s) SubCutaneous three times a day before meals  oxyCODONE    IR 5 milliGRAM(s) Oral every 8 hours PRN  piperacillin/tazobactam IVPB.. 3.375 Gram(s) IV Intermittent every 8 hours  senna 2 Tablet(s) Oral at bedtime  sodium chloride 0.9% lock flush 10 milliLiter(s) IV Push every 1 hour PRN    FHDiabetes mellitus, type 2 (Mother)    ,   PMHSeasonal allergies    Obesity (BMI 30.0-34.9)    Hydrocele, bilateral    HLD (hyperlipidemia)    Hypertension    Diabetes mellitus, type 2    PVD (peripheral vascular disease)    GERD (gastroesophageal reflux disease)    Vitamin D deficiency       PSHNo significant past surgical history        Labs              Vital Signs Last 24 Hrs  T(C): 36.7 (30 Sep 2022 14:14), Max: 36.8 (29 Sep 2022 21:18)  T(F): 98.1 (30 Sep 2022 14:14), Max: 98.3 (29 Sep 2022 21:18)  HR: 100 (30 Sep 2022 14:14) (82 - 100)  BP: 134/84 (30 Sep 2022 14:14) (134/84 - 156/98)  BP(mean): --  RR: 20 (30 Sep 2022 14:14) (17 - 20)  SpO2: 97% (30 Sep 2022 14:14) (97% - 100%)    Parameters below as of 30 Sep 2022 14:14  Patient On (Oxygen Delivery Method): room air      Sedimentation Rate, Erythrocyte: 86 mm/Hr (09-27-22 @ 07:59)  Sedimentation Rate, Erythrocyte: 96 mm/Hr (09-21-22 @ 14:20)         C-Reactive Protein, Serum: 13 mg/L (09-27-22 @ 07:59)  C-Reactive Protein, Serum: 88 mg/L (09-21-22 @ 14:20)       ROS unremarkable outside of HPI    PHYSICAL EXAM  LE Focused:  Pt is A&Ox3 in no acute distress  Left foot focused  Vasc: DP/PT pulses palpable ; CFT < 3 seconds to hallux on left; improved erythema and improved swelling to the left foot. TG: warm to cool. Pedal hair absent. Varicosities absent.   Derm: Left 1st interspace improved maceration noted with small wound that tunnels to plantar wound measuring .2 x .2cm. Left plantar foot wound sub-2nd and 3rd metatarsal heads measuring 4.7cm x 3.1cm x .5cm extending plantar with improved macerated edges and granular/adipose wound bed. No purulent drainage noted today; scant amount of serous drainage from interspace wound with healthy bleeding noted. Decreased plantar wound tunneling laterally.  Neuro: Protective sensation absent b/l; light touch sensation diminished b/l   MSK: No POP to left foot wounds       < from: Xray Foot AP + Lateral + Oblique, Left (09.21.22 @ 15:35) >  ACC: [id number] EXAM:  XR FOOT COMP MIN 3 VIEWS LT                          PROCEDURE DATE:  09/21/2022          INTERPRETATION:  LEFT foot    CLINICAL INFORMATION: Infection..    TECHNIQUE: AP,lateral and oblique views.    FINDINGS:  A plantar ulceration with soft tissue swelling of the ball of foot.    The bones and joint spaces are intact.  No fracture or dislocation.  No radiographic evidence of osteomyelitis.    IMPRESSION:    Plantar soft tissue ulceration with soft tissue swelling.  No radiographic evidence of osteomyelitis.  If osteomyelitis is considered  despite conservative therapy, and soft   tissue / bone infection requires further assessment, follow-up MRI   recommended.    --- End of Report ---    < end of copied text >        CULTURES:

## 2022-09-30 NOTE — PROGRESS NOTE ADULT - SUBJECTIVE AND OBJECTIVE BOX
NP Note discussed with  primary attending    Patient is a 41y old  Male who presents with a chief complaint of Left foot wound r/o Osteomyelitis (30 Sep 2022 10:43)      INTERVAL HPI/OVERNIGHT EVENTS: refused AM labs     MEDICATIONS  (STANDING):  atorvastatin 40 milliGRAM(s) Oral at bedtime  chlorhexidine 2% Cloths 1 Application(s) Topical daily  dextrose 5%. 1000 milliLiter(s) (50 mL/Hr) IV Continuous <Continuous>  dextrose 5%. 1000 milliLiter(s) (100 mL/Hr) IV Continuous <Continuous>  dextrose 50% Injectable 25 Gram(s) IV Push once  dextrose 50% Injectable 12.5 Gram(s) IV Push once  dextrose 50% Injectable 25 Gram(s) IV Push once  enalapril 10 milliGRAM(s) Oral daily  glucagon  Injectable 1 milliGRAM(s) IntraMuscular once  heparin   Injectable 5000 Unit(s) SubCutaneous every 8 hours  influenza   Vaccine 0.5 milliLiter(s) IntraMuscular once  insulin lispro (ADMELOG) corrective regimen sliding scale   SubCutaneous three times a day before meals  insulin lispro (ADMELOG) corrective regimen sliding scale   SubCutaneous at bedtime  insulin lispro Injectable (ADMELOG) 16 Unit(s) SubCutaneous three times a day before meals  piperacillin/tazobactam IVPB.. 3.375 Gram(s) IV Intermittent every 8 hours  senna 2 Tablet(s) Oral at bedtime    MEDICATIONS  (PRN):  acetaminophen     Tablet .. 650 milliGRAM(s) Oral every 6 hours PRN Temp greater or equal to 38C (100.4F), Mild Pain (1 - 3)  dextrose Oral Gel 15 Gram(s) Oral once PRN Blood Glucose LESS THAN 70 milliGRAM(s)/deciliter  oxyCODONE    IR 5 milliGRAM(s) Oral every 8 hours PRN Moderate Pain (4 - 6)  sodium chloride 0.9% lock flush 10 milliLiter(s) IV Push every 1 hour PRN Pre/post blood products, medications, blood draw, and to maintain line patency      __________________________________________________  REVIEW OF SYSTEMS:    CONSTITUTIONAL: No fever,   EYES: no acute visual disturbances  NECK: No pain or stiffness  RESPIRATORY: No cough; No shortness of breath  CARDIOVASCULAR: No chest pain, no palpitations  GASTROINTESTINAL: No pain. No nausea or vomiting; No diarrhea   NEUROLOGICAL: No headache or numbness, no tremors  MUSCULOSKELETAL: No joint pain, no muscle pain  GENITOURINARY: no dysuria, no frequency, no hesitancy  PSYCHIATRY: no depression , no anxiety  ALL OTHER  ROS negative        Vital Signs Last 24 Hrs  T(C): 36.7 (30 Sep 2022 14:14), Max: 36.8 (29 Sep 2022 21:18)  T(F): 98.1 (30 Sep 2022 14:14), Max: 98.3 (29 Sep 2022 21:18)  HR: 100 (30 Sep 2022 14:14) (82 - 100)  BP: 134/84 (30 Sep 2022 14:14) (134/84 - 156/98)  BP(mean): --  RR: 20 (30 Sep 2022 14:14) (16 - 20)  SpO2: 97% (30 Sep 2022 14:14) (97% - 100%)    Parameters below as of 30 Sep 2022 14:14  Patient On (Oxygen Delivery Method): room air        ________________________________________________  PHYSICAL EXAM:  GENERAL: NAD  HEENT: Normocephalic;  conjunctivae and sclerae clear  NECK : supple  CHEST/LUNG: Clear to auscultation bilaterally with good air entry   HEART: S1 S2  regular; no murmurs, gallops or rubs  ABDOMEN: Soft, Nontender, Nondistended; Bowel sounds present  EXTREMITIES:  L foot with wound VAC, post OP changes, erythema and swelling extending to Leg resolved, swelling localized to wound area but improved.   SKIN: warm and dry; no rash  NERVOUS SYSTEM:  Awake and alert; Oriented  to place, person and time  _________________________________________________  LABS:              CAPILLARY BLOOD GLUCOSE      POCT Blood Glucose.: 195 mg/dL (30 Sep 2022 11:14)  POCT Blood Glucose.: 264 mg/dL (30 Sep 2022 07:57)  POCT Blood Glucose.: 279 mg/dL (29 Sep 2022 20:52)  POCT Blood Glucose.: 233 mg/dL (29 Sep 2022 16:37)        RADIOLOGY & ADDITIONAL TESTS:   < from: MR Foot No Cont, Left (09.23.22 @ 11:28) >    IMPRESSION: Wound along the plantar aspect of the foot at the level of   the second MPJ. This communicates with a multiloculated collection that   is present in the first metatarsal space and dorsal soft tissues   overlying the second digit, consistent with an abscess. There is also a   smaller collection in the dorsal soft tissues overlying the third digit.  Areas of marrow signal alteration within the second digit metatarsal,   second digit proximal phalanx, the third digit proximal phalanx, the head   of the third metatarsal, and the head of the first metatarsal.   Differential diagnosis for the signal alteration at these sites includes   osteomyelitis and stress reaction.    --- End of Report ---      < end of copied text >    Imaging Personally Reviewed:  YES/NO    Consultant(s) Notes Reviewed:   YES/ No  < from: [facility] Physiol Extremity Lower 3+ Level, BI (09.21.22 @ 20:21) >  Findings/  Impression:  Right lower extremity: The ankle brachial index is 1.36. The pulse   waveforms are pulsatile but slightly diminished in amplitude.    Left lower extremity: The ankle brachial index is 1.29. The pulse   waveforms are diminished in the digit.    Diffusely calcified vessels limiting evaluation.  Small vessel disease suggested in the left foot.    --- End of Report ---    < end of copied text >< from: Xray Foot AP + Lateral + Oblique, Left (09.21.22 @ 15:35) >  IMPRESSION:    Plantar soft tissue ulceration with soft tissue swelling.  No radiographic evidence of osteomyelitis.  If osteomyelitis is considered  despite conservative therapy, and soft   tissue / bone infection requires further assessment, follow-up MRI   recommended.    --- End of Report ---    < end of copied text >    < from: US Duplex Venous Lower Ext Ltd, Left (09.21.22 @ 15:22) >  IMPRESSION:  No evidence of left lower extremity deep venous thrombosis.          --- End of Report ---    < end of copied text >    Plan of care was discussed with patient and /or primary care giver; all questions and concerns were addressed and care was aligned with patient's wishes.

## 2022-09-30 NOTE — PROGRESS NOTE ADULT - SUBJECTIVE AND OBJECTIVE BOX
Subjective:  Chart Notes, Work list Manager, and fingersticks reviewed.    MEDICATIONS  (STANDING):  atorvastatin 40 milliGRAM(s) Oral at bedtime  chlorhexidine 2% Cloths 1 Application(s) Topical daily  dextrose 5%. 1000 milliLiter(s) (50 mL/Hr) IV Continuous <Continuous>  dextrose 5%. 1000 milliLiter(s) (100 mL/Hr) IV Continuous <Continuous>  dextrose 50% Injectable 25 Gram(s) IV Push once  dextrose 50% Injectable 12.5 Gram(s) IV Push once  dextrose 50% Injectable 25 Gram(s) IV Push once  enalapril 10 milliGRAM(s) Oral daily  glucagon  Injectable 1 milliGRAM(s) IntraMuscular once  heparin   Injectable 5000 Unit(s) SubCutaneous every 8 hours  influenza   Vaccine 0.5 milliLiter(s) IntraMuscular once  insulin glargine Injectable (LANTUS) 46 Unit(s) SubCutaneous every morning  insulin lispro (ADMELOG) corrective regimen sliding scale   SubCutaneous three times a day before meals  insulin lispro (ADMELOG) corrective regimen sliding scale   SubCutaneous at bedtime  insulin lispro Injectable (ADMELOG) 14 Unit(s) SubCutaneous three times a day before meals  piperacillin/tazobactam IVPB.. 3.375 Gram(s) IV Intermittent every 8 hours  senna 2 Tablet(s) Oral at bedtime    MEDICATIONS  (PRN):  acetaminophen     Tablet .. 650 milliGRAM(s) Oral every 6 hours PRN Temp greater or equal to 38C (100.4F), Mild Pain (1 - 3)  dextrose Oral Gel 15 Gram(s) Oral once PRN Blood Glucose LESS THAN 70 milliGRAM(s)/deciliter  oxyCODONE    IR 5 milliGRAM(s) Oral every 8 hours PRN Moderate Pain (4 - 6)  sodium chloride 0.9% lock flush 10 milliLiter(s) IV Push every 1 hour PRN Pre/post blood products, medications, blood draw, and to maintain line patency      Review of Systems:  Constitutional: No fever  Eyes: No blurry vision  Neuro: No tremors  HEENT: No pain  Cardiovascular: No chest pain, palpitations  Respiratory: No SOB, no cough  GI: No nausea, vomiting, abdominal pain  : No dysuria  Skin: no rash  Psych: no depression  Endocrine: no polyuria, polydipsia  Hem/lymph: no swelling  Osteoporosis: no fractures    ALL OTHER SYSTEMS REVIEWED AND NEGATIVE    UNABLE TO OBTAIN    PHYSICAL EXAM:  VITALS: T(C): 36.5 (09-30-22 @ 05:04)  T(F): 97.7 (09-30-22 @ 05:04), Max: 98.3 (09-29-22 @ 21:18)  HR: 82 (09-30-22 @ 05:04) (82 - 98)  BP: 156/98 (09-30-22 @ 05:04) (136/89 - 156/98)  RR:  (16 - 18)  SpO2:  (97% - 100%)  Wt(kg): --  GENERAL: NAD,   HEENT:  Atraumatic, Normocephalic, drymucous membranes  THYROID: Normal size, no palpable nodules  RESPIRATORY: Clear to auscultation bilaterally; No rales, rhonchi, wheezing  CARDIOVASCULAR: Regular rate and rhythm; No murmurs; no peripheral edema  GI: Soft, nontender, non distended, +ve abdominal obesity  EXTREMITIES: +ve peripheral pulses, -ve pedal edema  SKIN: Dry, intact, No rashes or lesions  PSYCH: Alert and oriented x 3    CAPILLARY BLOOD GLUCOSE      POCT Blood Glucose.: 264 mg/dL (30 Sep 2022 07:57)  POCT Blood Glucose.: 279 mg/dL (29 Sep 2022 20:52)  POCT Blood Glucose.: 233 mg/dL (29 Sep 2022 16:37)  POCT Blood Glucose.: 267 mg/dL (29 Sep 2022 11:34)                         Subjective:  Chart Notes, Work list Manager, and fingersticks reviewed. Patient reports of feeling better, states he has good appetite. Denies eating or drinking concentrated sweets or outside food.     Medications (Standing):  atorvastatin 40 milliGRAM(s) Oral at bedtime  chlorhexidine 2% Cloths 1 Application(s) Topical daily  dextrose 5%. 1000 milliLiter(s) (50 mL/Hr) IV Continuous <Continuous>  dextrose 5%. 1000 milliLiter(s) (100 mL/Hr) IV Continuous <Continuous>  dextrose 50% Injectable 25 Gram(s) IV Push once  dextrose 50% Injectable 12.5 Gram(s) IV Push once  dextrose 50% Injectable 25 Gram(s) IV Push once  enalapril 10 milliGRAM(s) Oral daily  glucagon  Injectable 1 milliGRAM(s) IntraMuscular once  heparin   Injectable 5000 Unit(s) SubCutaneous every 8 hours  influenza   Vaccine 0.5 milliLiter(s) IntraMuscular once  insulin glargine Injectable (LANTUS) 46 Unit(s) SubCutaneous every morning  insulin lispro (ADMELOG) corrective regimen sliding scale   SubCutaneous three times a day before meals  insulin lispro (ADMELOG) corrective regimen sliding scale   SubCutaneous at bedtime  insulin lispro Injectable (ADMELOG) 14 Unit(s) SubCutaneous three times a day before meals  piperacillin/tazobactam IVPB.. 3.375 Gram(s) IV Intermittent every 8 hours  senna 2 Tablet(s) Oral at bedtime    Medications (PRN)  acetaminophen     Tablet .. 650 milliGRAM(s) Oral every 6 hours PRN Temp greater or equal to 38C (100.4F), Mild Pain (1 - 3)  dextrose Oral Gel 15 Gram(s) Oral once PRN Blood Glucose LESS THAN 70 milliGRAM(s)/deciliter  oxyCODONE    IR 5 milliGRAM(s) Oral every 8 hours PRN Moderate Pain (4 - 6)  sodium chloride 0.9% lock flush 10 milliLiter(s) IV Push every 1 hour PRN Pre/post blood products, medications, blood draw, and to maintain line patency    Review of Systems:  Constitutional: No fever  Eyes: No blurry vision  HEENT: No pain  Cardiovascular: No chest pain, palpitations  Respiratory: No SOB, no cough  GI: No nausea, vomiting, abdominal pain  Extremities: No pain in Left foot  Endocrine: no polyuria, polydipsia    Physical Exam:  VITALS: T(C): 36.5 (09-30-22 @ 05:04)  T(F): 97.7 (09-30-22 @ 05:04), Max: 98.3 (09-29-22 @ 21:18)  HR: 82 (09-30-22 @ 05:04) (82 - 98)  BP: 156/98 (09-30-22 @ 05:04) (136/89 - 156/98)  RR:  (16 - 18)  SpO2:  (97% - 100%)    Constitutional: Not in acute distress, non- ill- appearing, obese   HEENT: Moist mucous membranes  Neck:  No JVD, bruits or thyromegaly, No thyroid nodules palpable, no LAD  Respiratory:  Respiratory effort normal, lungs clear to ausculation, without rales or rhonchi  Cardiovascular:  Regular heart rate, normal S1 and S2 sounds, without murmur, rub or gallop.  Gastrointestinal: Soft, non tender without hepatosplenomegaly and masses, mild +ve abdominal obesity  Extremities:  Bandage on the left foot present. Sensation intact in R feet, no cyanosis, clubbing or edema, positive pedal pulses +ve in right foot. Venous stasis and varicose veins +ve in lower extremities.  Neurological:  Alert and Oriented     Capillary Blood Glucose:  POCT Blood Glucose.: 264 mg/dL (30 Sep 2022 07:57)  POCT Blood Glucose.: 279 mg/dL (29 Sep 2022 20:52)  POCT Blood Glucose.: 233 mg/dL (29 Sep 2022 16:37)  POCT Blood Glucose.: 267 mg/dL (29 Sep 2022 11:34)    Complete Blood Count + Automated Diff (09.27.22 @ 07:59)    WBC Count: 7.56 K/uL    RBC Count: 4.67 M/uL    Hemoglobin: 12.0 g/dL    Hematocrit: 37.9 %    Mean Cell Volume: 81.2 fl    Mean Cell Hemoglobin: 25.7 pg    Mean Cell Hemoglobin Conc: 31.7 gm/dL    Red Cell Distrib Width: 14.0 %    Platelet Count - Automated: 336 K/uL    Auto Neutrophil #: 4.63 K/uL    Auto Lymphocyte #: 1.84 K/uL    Auto Monocyte #: 0.81 K/uL    Auto Eosinophil #: 0.22 K/uL    Auto Basophil #: 0.04 K/uL    Auto Neutrophil %: 61.3    Basic Metabolic Panel in AM (09.27.22 @ 07:59)    Sodium, Serum: 136 mmol/L    Potassium, Serum: 4.0 mmol/L    Chloride, Serum: 102 mmol/L    Carbon Dioxide, Serum: 26 mmol/L    Anion Gap, Serum: 8 mmol/L    Blood Urea Nitrogen, Serum: 15 mg/dL    Creatinine, Serum: 0.87 mg/dL    Glucose, Serum: 262 mg/dL    Calcium, Total Serum: 9.4 mg/dL    eGFR: 110:     A1C with Estimated Average Glucose Result: 12.3  Islet Cell Antibody: <1:4:   Glutamic Acid Decarboxylase Antibody: 0.00 (09.23.22 @ 05:08)    Lipid Profile (09.27.22 @ 07:59)    Cholesterol, Serum: 138 mg/dL    Triglycerides, Serum: 84 mg/dL    HDL Cholesterol, Serum: 41 mg/dL    Non HDL Cholesterol: 97: Patients Atherosclerotic Cardiovascular Disease (ASCVD) Risk  Optimal Level (mg/dL)    Assessment and Plan:  41 year old male with hx of T2DM, HTN, HLD, Obesity admitted for Left plantar foot wound, Left 1st interspace wound , Left 2nd metatarsal osteomyelitis s/p excisional debridement ulcer of left foot on 9/26. Endocrinology is following for glycemic management.     1) Poorly Controlled Type 2 Diabetes Mellitus:  HBA1C above goal 12.3%  [id number] and Anti-islet cell antibodies are negative, so patient most likely has type 2 diabetes.   In hospital, patient's is hyperglycemic likely due to baseline severe insulin resistance, decreased ambulatory status and excellent appetite   Yesterday patient received total of 126 units of insulin ( basal +bolus insulin) despite that his blood sugar were in 200s.   Will further increase the insulin as below     Inpatient Recommendations:  Basal Insulin:   Increase Glargine ( Lantus) 52 units in the morning. Give additional 6 units of Lantus today as patient received Lantus 46 units in the morning.     Nutritional Insulin:  Increase Lispro (Admelog) to 16 units with meals, (Hold if NPO or eating <50% of meals)   If the postprandial Blood sugars remains in 200s then increase the lispro to 18 units with meals.    Correctional Insulin:  Continue Moderate Lispro (Admelog) correctional scale with meals and bedtime.     Oral Diabetes Medications:  None in the hospital    Discharge Recommendations:  Final DC recs pending but will plan to discharge on Lantus once daily, Lispro with meals and Metformin ( if renal function remains intact). Will not resume Glipizide or Januvia on discharge  Explained to patient that he might require less doses of insulin in an outpatient setting as oral hypoglycemic agent (metformin) decreases insulin resistance.     2) Hyperlipidemia:  Lipid profile noted. LDL is at goal     Recommendations:  Continue Atorvastatin 40 mg daily    Plan discussed in detail with patient and primary team    Carisa Marr MD   Endocrinology, Diabetes and Metabolism   Available on MS. teams

## 2022-09-30 NOTE — PROGRESS NOTE ADULT - ASSESSMENT
41 year old male with past medical history of DM (last A1c >10), HTN, HLD who p/w left foot wound. Of note was admitted for 7 days at [facility] in which he received Unasyn and then was later discharged with a PICC line for additional 2-3 weeks of IV Unasyn, but wound started to have purulent discharge andreported fevers and chills.  Admitted to medicine for Left diabetic foot ulcer,  MRI confirmed osteomyelitis. RD Stratton followed started on IV Zosyn.   Vascular followed and found No acute vascular intervention required at this time.   s/p left foot wound debridement on 09/26 with podiatry, reccomended to continue IV Zosyn x6 weeks + wound vacc. PICC line placed 9/29, pending home care services [facility] to follow

## 2022-09-30 NOTE — PROGRESS NOTE ADULT - PROBLEM SELECTOR PLAN 3
Uncontrolled  a1c 12.3  Lantus decreased to 46U  Admelog decreased to 14U TID   Continue sliding scale insulin coverage  Continue glucose monitoring  Continue low carb diet   Dr. Marr, Endocrine, follows

## 2022-09-30 NOTE — PROGRESS NOTE ADULT - NS ATTEND OPT1A GEN_ALL_CORE
Exam/Medical decision making

## 2022-10-01 LAB
GLUCOSE BLDC GLUCOMTR-MCNC: 145 MG/DL — HIGH (ref 70–99)
GLUCOSE BLDC GLUCOMTR-MCNC: 147 MG/DL — HIGH (ref 70–99)
GLUCOSE BLDC GLUCOMTR-MCNC: 89 MG/DL — SIGNIFICANT CHANGE UP (ref 70–99)
GLUCOSE BLDC GLUCOMTR-MCNC: 96 MG/DL — SIGNIFICANT CHANGE UP (ref 70–99)

## 2022-10-01 PROCEDURE — 99233 SBSQ HOSP IP/OBS HIGH 50: CPT

## 2022-10-01 PROCEDURE — 93971 EXTREMITY STUDY: CPT | Mod: 26,LT

## 2022-10-01 RX ADMIN — ATORVASTATIN CALCIUM 40 MILLIGRAM(S): 80 TABLET, FILM COATED ORAL at 23:41

## 2022-10-01 RX ADMIN — Medication 10 MILLIGRAM(S): at 06:23

## 2022-10-01 RX ADMIN — HEPARIN SODIUM 5000 UNIT(S): 5000 INJECTION INTRAVENOUS; SUBCUTANEOUS at 13:55

## 2022-10-01 RX ADMIN — INSULIN GLARGINE 52 UNIT(S): 100 INJECTION, SOLUTION SUBCUTANEOUS at 08:22

## 2022-10-01 RX ADMIN — PIPERACILLIN AND TAZOBACTAM 25 GRAM(S): 4; .5 INJECTION, POWDER, LYOPHILIZED, FOR SOLUTION INTRAVENOUS at 23:40

## 2022-10-01 RX ADMIN — Medication 16 UNIT(S): at 12:04

## 2022-10-01 RX ADMIN — PIPERACILLIN AND TAZOBACTAM 25 GRAM(S): 4; .5 INJECTION, POWDER, LYOPHILIZED, FOR SOLUTION INTRAVENOUS at 13:56

## 2022-10-01 RX ADMIN — HEPARIN SODIUM 5000 UNIT(S): 5000 INJECTION INTRAVENOUS; SUBCUTANEOUS at 06:23

## 2022-10-01 RX ADMIN — PIPERACILLIN AND TAZOBACTAM 25 GRAM(S): 4; .5 INJECTION, POWDER, LYOPHILIZED, FOR SOLUTION INTRAVENOUS at 06:24

## 2022-10-01 RX ADMIN — Medication 16 UNIT(S): at 08:13

## 2022-10-01 RX ADMIN — CHLORHEXIDINE GLUCONATE 1 APPLICATION(S): 213 SOLUTION TOPICAL at 11:58

## 2022-10-01 RX ADMIN — HEPARIN SODIUM 5000 UNIT(S): 5000 INJECTION INTRAVENOUS; SUBCUTANEOUS at 23:42

## 2022-10-01 RX ADMIN — Medication 16 UNIT(S): at 17:15

## 2022-10-01 NOTE — PROGRESS NOTE ADULT - SUBJECTIVE AND OBJECTIVE BOX
Patient is a 41y old  Male who presents with a chief complaint of Left foot wound r/o Osteomyelitis (30 Sep 2022 15:20)    Patient was seen and examined at bedside   Reports LUE pain and swelling has improved    INTERVAL HPI/OVERNIGHT EVENTS:  T(C): 37.6 (10-01-22 @ 14:02), Max: 37.6 (10-01-22 @ 14:02)  HR: 101 (10-01-22 @ 14:02) (88 - 101)  BP: 130/89 (10-01-22 @ 14:02) (130/89 - 138/89)  RR: 19 (10-01-22 @ 14:02) (19 - 20)  SpO2: 99% (10-01-22 @ 14:02) (97% - 99%)  Wt(kg): --  I&O's Summary      REVIEW OF SYSTEMS: denies fever, chills, SOB, palpitations, chest pain, abdominal pain, nausea, vomiting, diarrhea, constipation, dizziness    MEDICATIONS  (STANDING):  atorvastatin 40 milliGRAM(s) Oral at bedtime  chlorhexidine 2% Cloths 1 Application(s) Topical daily  dextrose 5%. 1000 milliLiter(s) (50 mL/Hr) IV Continuous <Continuous>  dextrose 5%. 1000 milliLiter(s) (100 mL/Hr) IV Continuous <Continuous>  dextrose 50% Injectable 25 Gram(s) IV Push once  dextrose 50% Injectable 12.5 Gram(s) IV Push once  dextrose 50% Injectable 25 Gram(s) IV Push once  enalapril 10 milliGRAM(s) Oral daily  glucagon  Injectable 1 milliGRAM(s) IntraMuscular once  heparin   Injectable 5000 Unit(s) SubCutaneous every 8 hours  influenza   Vaccine 0.5 milliLiter(s) IntraMuscular once  insulin glargine Injectable (LANTUS) 52 Unit(s) SubCutaneous every morning  insulin lispro (ADMELOG) corrective regimen sliding scale   SubCutaneous three times a day before meals  insulin lispro (ADMELOG) corrective regimen sliding scale   SubCutaneous at bedtime  insulin lispro Injectable (ADMELOG) 16 Unit(s) SubCutaneous three times a day before meals  piperacillin/tazobactam IVPB.. 3.375 Gram(s) IV Intermittent every 8 hours  senna 2 Tablet(s) Oral at bedtime    MEDICATIONS  (PRN):  acetaminophen     Tablet .. 650 milliGRAM(s) Oral every 6 hours PRN Temp greater or equal to 38C (100.4F), Mild Pain (1 - 3)  dextrose Oral Gel 15 Gram(s) Oral once PRN Blood Glucose LESS THAN 70 milliGRAM(s)/deciliter  oxyCODONE    IR 5 milliGRAM(s) Oral every 8 hours PRN Moderate Pain (4 - 6)  sodium chloride 0.9% lock flush 10 milliLiter(s) IV Push every 1 hour PRN Pre/post blood products, medications, blood draw, and to maintain line patency      PHYSICAL EXAM:  GENERAL: NAD, well-groomed, well-developed  HEAD:  Atraumatic, Normocephalic  NERVOUS SYSTEM:  Alert & Oriented X3, Good concentration; no focal deficit   CHEST/LUNG: Clear to auscultation bilaterally; No rales, rhonchi, wheezing, or rubs  HEART: Regular rate and rhythm; No murmurs, rubs, or gallops  ABDOMEN: Soft, Nontender, Nondistended; Bowel sounds present  EXTREMITIES:  left toe bandage noted  LUE tenderness at the previous IV site with some induration, no erythema or swelling. PICC site no signs of inflammation    LABS:    CAPILLARY BLOOD GLUCOSE      POCT Blood Glucose.: 145 mg/dL (01 Oct 2022 11:43)  POCT Blood Glucose.: 147 mg/dL (01 Oct 2022 07:58)  POCT Blood Glucose.: 261 mg/dL (30 Sep 2022 22:17)  POCT Blood Glucose.: 88 mg/dL (30 Sep 2022 16:52)

## 2022-10-01 NOTE — PROGRESS NOTE ADULT - ASSESSMENT
Left foot OM s/p I&D on 9/26  Superficial thrombophlebitis of LUE  Diabetic foot ulcer   HTN   HLD    Plan:  S/p PICC line   Cont Zosyn  Wound vac at bedside  CM following for Home care   patient will follow his primary ID physician after discharge, weekly labs  cont current meds for HTN  BS controlled now, will dc with same regimen  Cont Statin   Podiatry follow up as outpatient   Plan of care was discussed with patient at bedside; all questions and concerns were addressed and care was aligned with patient's wishes.

## 2022-10-01 NOTE — CHART NOTE - NSCHARTNOTEFT_GEN_A_CORE
As per ID recommendation to be  discharge with Zosyn 3.375g q8 hrs IV 4 hrs infusion for total 6 weeks of therapy(9/26- 11/14)    Spoke with the pharmacist Parag at [phone]. The above dose can not be approved because pt would have to be on IV infusion for 24 hrs   Recommended dose: Zosyn 3.75g every 6 hrs infused over 30 min As per ID recommendation to be  discharge with Zosyn 3.375g q8 hrs IV 4 hrs infusion for total 6 weeks of therapy(9/26- 11/14)    Spoke with the pharmacist Parag at [phone]. The above dose can not be approved because pt would have to be on IV infusion for 24 hrs   Recommended dose: Zosyn 3.75g every 6 hrs infused over 30 min    Pending ID approval of above dose As per ID recommendation to be  discharge with Zosyn 3.375g q8 hrs IV 4 hrs infusion for total 6 weeks of therapy(9/26- 11/14)    Spoke with the pharmacist Parag at [phone]. The above dose can not be approved because pt would have to be on IV infusion for 24 hrs   Recommended dose: Zosyn 3.75g every 6 hrs infused over 30 min    Pending ID approval of above dose    Addendum:  Confirmed with Dr. Stratton. Recommends 3.375mg q6hrs infuse over 30mins for 6 weeks.

## 2022-10-01 NOTE — DISCHARGE NOTE PROVIDER - NPI NUMBER (FOR SYSADMIN USE ONLY) :
[[id number]]
This was a shared visit with the JOSE ANTONIO. I reviewed and verified the documentation and independently performed the documented:

## 2022-10-02 LAB
CULTURE RESULTS: SIGNIFICANT CHANGE UP
GLUCOSE BLDC GLUCOMTR-MCNC: 133 MG/DL — HIGH (ref 70–99)
GLUCOSE BLDC GLUCOMTR-MCNC: 156 MG/DL — HIGH (ref 70–99)
GLUCOSE BLDC GLUCOMTR-MCNC: 269 MG/DL — HIGH (ref 70–99)
GLUCOSE BLDC GLUCOMTR-MCNC: 91 MG/DL — SIGNIFICANT CHANGE UP (ref 70–99)
SPECIMEN SOURCE: SIGNIFICANT CHANGE UP

## 2022-10-02 PROCEDURE — 99233 SBSQ HOSP IP/OBS HIGH 50: CPT

## 2022-10-02 RX ORDER — METOPROLOL TARTRATE 50 MG
5 TABLET ORAL ONCE
Refills: 0 | Status: DISCONTINUED | OUTPATIENT
Start: 2022-10-02 | End: 2022-10-04

## 2022-10-02 RX ADMIN — ATORVASTATIN CALCIUM 40 MILLIGRAM(S): 80 TABLET, FILM COATED ORAL at 22:35

## 2022-10-02 RX ADMIN — PIPERACILLIN AND TAZOBACTAM 25 GRAM(S): 4; .5 INJECTION, POWDER, LYOPHILIZED, FOR SOLUTION INTRAVENOUS at 14:34

## 2022-10-02 RX ADMIN — Medication 6: at 08:29

## 2022-10-02 RX ADMIN — HEPARIN SODIUM 5000 UNIT(S): 5000 INJECTION INTRAVENOUS; SUBCUTANEOUS at 14:36

## 2022-10-02 RX ADMIN — Medication 10 MILLIGRAM(S): at 06:19

## 2022-10-02 RX ADMIN — Medication 2: at 17:12

## 2022-10-02 RX ADMIN — CHLORHEXIDINE GLUCONATE 1 APPLICATION(S): 213 SOLUTION TOPICAL at 12:56

## 2022-10-02 RX ADMIN — INSULIN GLARGINE 52 UNIT(S): 100 INJECTION, SOLUTION SUBCUTANEOUS at 08:32

## 2022-10-02 RX ADMIN — PIPERACILLIN AND TAZOBACTAM 25 GRAM(S): 4; .5 INJECTION, POWDER, LYOPHILIZED, FOR SOLUTION INTRAVENOUS at 22:34

## 2022-10-02 RX ADMIN — Medication 16 UNIT(S): at 12:47

## 2022-10-02 RX ADMIN — PIPERACILLIN AND TAZOBACTAM 25 GRAM(S): 4; .5 INJECTION, POWDER, LYOPHILIZED, FOR SOLUTION INTRAVENOUS at 06:19

## 2022-10-02 RX ADMIN — HEPARIN SODIUM 5000 UNIT(S): 5000 INJECTION INTRAVENOUS; SUBCUTANEOUS at 22:34

## 2022-10-02 RX ADMIN — HEPARIN SODIUM 5000 UNIT(S): 5000 INJECTION INTRAVENOUS; SUBCUTANEOUS at 06:20

## 2022-10-02 RX ADMIN — Medication 16 UNIT(S): at 08:31

## 2022-10-02 RX ADMIN — Medication 16 UNIT(S): at 17:13

## 2022-10-02 NOTE — PROGRESS NOTE ADULT - ASSESSMENT
A:  - DM uncontrolled  - Left plantar foot wound; Left 1st interspace wound, abscess  - Left 2nd metatarsal osteomyelitis        P:   Patient evaluated and Chart reviewed   Discussed diagnosis and treatment with patient  Plan for conservative care for osteomyelitis, with long term abx.  Dressed wound with wet to dry dressing  Applied betadine paint to 1st interspace with mild maceration prior to applying wound vac  Pt to be d/c w/ wound vac pending authorization   Continue with IV antibiotics As Per ID recc; pt to be on zosyn for 6 weeks via picc line   Encouraged pt to keep left foot elevated as much as possible  Pt to f/o at [address] [facility], [address] phone [phone] w/ Dr. Serrano within 1 week of d/c   Discussed importance of daily foot examinations and proper shoe gear and to importance of lower Fasting Blood Glucose levels, keep FS <180 for optimal wound healing. Patient is at risk for non healing, delayed healing, worsening infection, amputation. Stressed importance of glycemic control and outpatient follow-up. Patient demonstrated verbal understanding.   Podiatry to follow while in house.  Discussed with Attending Dr. Serrano     WC: Wound vac changes Monday, Wednesday, Friday. Wound vac to be used continuously black foam to plantar wound, bridged dorsally through first interspace with dhiraj pad on dorsum of foot. (no black foam in interspace wound. Cover with Tegaderm; DSD and Ace over vac; vac pressure continuous at 126 mmHg

## 2022-10-02 NOTE — PROGRESS NOTE ADULT - SUBJECTIVE AND OBJECTIVE BOX
Patient is a 41y old  Male who presents with a chief complaint of Left foot wound r/o Osteomyelitis (01 Oct 2022 14:32)    Patient was seen and examined at bedside   Denies any complains today  LUE pain and swelling continues to improve    INTERVAL HPI/OVERNIGHT EVENTS:  T(C): 36.7 (10-02-22 @ 13:47), Max: 36.8 (10-02-22 @ 05:29)  HR: 100 (10-02-22 @ 13:47) (87 - 100)  BP: 137/86 (10-02-22 @ 13:47) (137/86 - 151/95)  RR: 18 (10-02-22 @ 13:47) (18 - 18)  SpO2: 98% (10-02-22 @ 13:47) (98% - 100%)  Wt(kg): --  I&O's Summary      REVIEW OF SYSTEMS: denies fever, chills, SOB, palpitations, chest pain, abdominal pain, nausea, vomiting, diarrhea, constipation, dizziness    MEDICATIONS  (STANDING):  atorvastatin 40 milliGRAM(s) Oral at bedtime  chlorhexidine 2% Cloths 1 Application(s) Topical daily  dextrose 5%. 1000 milliLiter(s) (50 mL/Hr) IV Continuous <Continuous>  dextrose 5%. 1000 milliLiter(s) (100 mL/Hr) IV Continuous <Continuous>  dextrose 50% Injectable 25 Gram(s) IV Push once  dextrose 50% Injectable 12.5 Gram(s) IV Push once  dextrose 50% Injectable 25 Gram(s) IV Push once  enalapril 10 milliGRAM(s) Oral daily  glucagon  Injectable 1 milliGRAM(s) IntraMuscular once  heparin   Injectable 5000 Unit(s) SubCutaneous every 8 hours  influenza   Vaccine 0.5 milliLiter(s) IntraMuscular once  insulin glargine Injectable (LANTUS) 52 Unit(s) SubCutaneous every morning  insulin lispro (ADMELOG) corrective regimen sliding scale   SubCutaneous three times a day before meals  insulin lispro (ADMELOG) corrective regimen sliding scale   SubCutaneous at bedtime  insulin lispro Injectable (ADMELOG) 16 Unit(s) SubCutaneous three times a day before meals  piperacillin/tazobactam IVPB.. 3.375 Gram(s) IV Intermittent every 8 hours  senna 2 Tablet(s) Oral at bedtime    MEDICATIONS  (PRN):  acetaminophen     Tablet .. 650 milliGRAM(s) Oral every 6 hours PRN Temp greater or equal to 38C (100.4F), Mild Pain (1 - 3)  dextrose Oral Gel 15 Gram(s) Oral once PRN Blood Glucose LESS THAN 70 milliGRAM(s)/deciliter  oxyCODONE    IR 5 milliGRAM(s) Oral every 8 hours PRN Moderate Pain (4 - 6)  sodium chloride 0.9% lock flush 10 milliLiter(s) IV Push every 1 hour PRN Pre/post blood products, medications, blood draw, and to maintain line patency      PHYSICAL EXAM:  GENERAL: NAD, well-groomed, well-developed  HEAD:  Atraumatic, Normocephalic  NERVOUS SYSTEM:  Alert & Oriented X3, Good concentration; Motor Strength 5/5 B/L upper and lower extremities  CHEST/LUNG: Clear to auscultation bilaterally; No rales, rhonchi, wheezing, or rubs  HEART: Regular rate and rhythm; No murmurs, rubs, or gallops  ABDOMEN: Soft, Nontender, Nondistended; Bowel sounds present  EXTREMITIES: LLE bandage noted, LUE thrombophlebitis improving   SKIN: No rashes or lesions    LABS:      CAPILLARY BLOOD GLUCOSE      POCT Blood Glucose.: 91 mg/dL (02 Oct 2022 11:38)  POCT Blood Glucose.: 269 mg/dL (02 Oct 2022 08:04)  POCT Blood Glucose.: 89 mg/dL (01 Oct 2022 21:20)  POCT Blood Glucose.: 96 mg/dL (01 Oct 2022 16:58)

## 2022-10-02 NOTE — PROGRESS NOTE ADULT - SUBJECTIVE AND OBJECTIVE BOX
Podiatry Interval: Pt is seen bedside in no acute distress. Pt is in a droplet precaution exposed room. Denies pain to left foot and states he hasn't been walking. Patient expresses that he is really ready to go home. Denies any overnight acute events. Denies further constitutional symptoms. Denies further pedal complaints.     Podiatry HPI: Pt is a 41M who presents to ED with his girlfriend with a chief complaint of worsening left foot wound. Pt states about 1.5-2 months prior, he was walking in work boots when he stepped on a metal screw. Pt admits he didn't feel it initially due to his neuropathy from diabetes and it wasn't until he noticed blood that he saw a wound. Following this, pt arrived at [facility] where he was admitted for 7 days where they did a bedside I&D and given Unasyn and later discharged on IV picc line with more unasyn. Pt states he previously had a home health nurse changing his foot dressings every other day. Admits he noticed a blistering that worsened to bottom of left foot and that he had a lot of drainage to left 1st interspace with pinpoint wound. Pt states today, he experienced chills and burning pain that worsened when walking. Denies further constitutional symptoms. Denies recent acute trauma. Denies further pedal complaints.     Patient is a 41y old  Male who presents with a chief complaint of Left foot wound r/o Osteomyelitis (21 Sep 2022 18:46)      HPI:  Patient is a 42 yo male with hx of DM (last A1c >10), HTN, HLD presents with a left foot wound. The left foot wound initially started 6 weeks ago when the patient had a screw puncture his sole of his slipper and then suffered a wound on the bottom of the left foot. Patient stated he then when to [facility] and was admitted for 7 days in which he received Unasyn and then was later discharged with PICC line for additional 2-3 weeks of IV unasyn. Patient had wound care nurse come to his home, however started to develop blistering of the left foot and enlargement of the wound on the sole of his foot. Wounds also developed between the left great toe and 2nd toe. Patient reports purulent discharge and pain. Pain is in the left foot, 10/10 burning, constant and worsened with ambulation. Patient reports fevers and chills. Patient also states he has numbness in the 2nd toe on the left side and tingling. Patient denies chest pain, sob or palpitations. Denies syncope, dizziness.     Patient admits to  (-) Fevers, (-) Chills, (-) Nausea, (-) Vomiting, (-) Shortness of Breath (-) calf pain (-) chest pain     Medications acetaminophen     Tablet .. 650 milliGRAM(s) Oral every 6 hours PRN  atorvastatin 40 milliGRAM(s) Oral at bedtime  chlorhexidine 2% Cloths 1 Application(s) Topical daily  dextrose 5%. 1000 milliLiter(s) IV Continuous <Continuous>  dextrose 5%. 1000 milliLiter(s) IV Continuous <Continuous>  dextrose 50% Injectable 25 Gram(s) IV Push once  dextrose 50% Injectable 12.5 Gram(s) IV Push once  dextrose 50% Injectable 25 Gram(s) IV Push once  dextrose Oral Gel 15 Gram(s) Oral once PRN  enalapril 10 milliGRAM(s) Oral daily  glucagon  Injectable 1 milliGRAM(s) IntraMuscular once  heparin   Injectable 5000 Unit(s) SubCutaneous every 8 hours  influenza   Vaccine 0.5 milliLiter(s) IntraMuscular once  insulin glargine Injectable (LANTUS) 52 Unit(s) SubCutaneous every morning  insulin lispro (ADMELOG) corrective regimen sliding scale   SubCutaneous at bedtime  insulin lispro (ADMELOG) corrective regimen sliding scale   SubCutaneous three times a day before meals  insulin lispro Injectable (ADMELOG) 16 Unit(s) SubCutaneous three times a day before meals  oxyCODONE    IR 5 milliGRAM(s) Oral every 8 hours PRN  piperacillin/tazobactam IVPB.. 3.375 Gram(s) IV Intermittent every 8 hours  senna 2 Tablet(s) Oral at bedtime  sodium chloride 0.9% lock flush 10 milliLiter(s) IV Push every 1 hour PRN    FHDiabetes mellitus, type 2 (Mother)    ,   PMHSeasonal allergies    Obesity (BMI 30.0-34.9)    Hydrocele, bilateral    HLD (hyperlipidemia)    Hypertension    Diabetes mellitus, type 2    PVD (peripheral vascular disease)    GERD (gastroesophageal reflux disease)    Vitamin D deficiency       PSHNo significant past surgical history        Labs              Vital Signs Last 24 Hrs  T(C): 36.7 (02 Oct 2022 13:47), Max: 36.8 (02 Oct 2022 05:29)  T(F): 98.1 (02 Oct 2022 13:47), Max: 98.2 (02 Oct 2022 05:29)  HR: 100 (02 Oct 2022 13:47) (87 - 100)  BP: 137/86 (02 Oct 2022 13:47) (137/86 - 151/95)  BP(mean): --  RR: 18 (02 Oct 2022 13:47) (18 - 18)  SpO2: 98% (02 Oct 2022 13:47) (98% - 100%)    Parameters below as of 02 Oct 2022 13:47  Patient On (Oxygen Delivery Method): room air      Sedimentation Rate, Erythrocyte: 86 mm/Hr (09-27-22 @ 07:59)  Sedimentation Rate, Erythrocyte: 96 mm/Hr (09-21-22 @ 14:20)         C-Reactive Protein, Serum: 13 mg/L (09-27-22 @ 07:59)  C-Reactive Protein, Serum: 88 mg/L (09-21-22 @ 14:20)      ROS unremarkable outside of HPI    PHYSICAL EXAM  LE Focused:  Pt is A&Ox3 in no acute distress  Left foot focused  Vasc: DP/PT pulses palpable ; CFT < 3 seconds to hallux on left; improved erythema and improved swelling to the left foot. TG: warm to cool. Pedal hair absent. Varicosities absent.   Derm: Left 1st interspace improved maceration noted with small wound that tunnels to plantar wound measuring .2 x .2cm. Left plantar foot wound sub-2nd and 3rd metatarsal heads measuring 4.7cm x 3.1cm x .5cm extending plantar with improved macerated edges and granular/adipose wound bed. No purulent drainage noted today; scant amount of serous drainage from interspace wound with healthy bleeding noted. Decreased plantar wound tunneling laterally.  Neuro: Protective sensation absent b/l; light touch sensation diminished b/l   MSK: No POP to left foot wounds       < from: Xray Foot AP + Lateral + Oblique, Left (09.21.22 @ 15:35) >  ACC: [id number] EXAM:  XR FOOT COMP MIN 3 VIEWS LT                          PROCEDURE DATE:  09/21/2022          INTERPRETATION:  LEFT foot    CLINICAL INFORMATION: Infection..    TECHNIQUE: AP,lateral and oblique views.    FINDINGS:  A plantar ulceration with soft tissue swelling of the ball of foot.    The bones and joint spaces are intact.  No fracture or dislocation.  No radiographic evidence of osteomyelitis.    IMPRESSION:    Plantar soft tissue ulceration with soft tissue swelling.  No radiographic evidence of osteomyelitis.  If osteomyelitis is considered  despite conservative therapy, and soft   tissue / bone infection requires further assessment, follow-up MRI   recommended.    --- End of Report ---    < end of copied text >        CULTURES:

## 2022-10-02 NOTE — PROGRESS NOTE ADULT - ASSESSMENT
Left foot OM s/p I&D on 9/26  Superficial thrombophlebitis of LUE  Diabetic foot ulcer   HTN   HLD    Plan:  S/p PICC line   Cont Zosyn  Wound vac at bedside  CM following for Home care   patient will follow his primary ID physician after discharge, weekly labs  cont current meds for HTN  BS controlled now  Cont Statin   Podiatry follow up as outpatient   Discharge pending approval of antibiotics  Plan of care was discussed with patient at bedside; all questions and concerns were addressed and care was aligned with patient's wishes.

## 2022-10-02 NOTE — CHART NOTE - NSCHARTNOTEFT_GEN_A_CORE
EVENT: Elevated BP trend    BRIEF HPI: 41M who presents to ED with his girlfriend with a chief complaint of worsening left foot wound. Pt states about 1.5-2 months prior, he was walking in work boots when he stepped on a metal screw. Pt admits he didn't feel it initially due to his neuropathy from diabetes and it wasn't until he noticed blood that he saw a wound. Following this, pt arrived at [facility] where he was admitted for 7 days where they did a bedside I&D and given Unasyn and later discharged on IV picc line with more Unasyn. Pt states he previously had a home health nurse changing his foot dressings every other day. Admits he noticed a blistering that worsened to bottom of left foot and that he had a lot of drainage to left 1st interspace with pinpoint wound. Pt states today, he experienced chills and burning pain that worsened when walking. Now BP elevated    OBJECTIVE:  Vital Signs Last 24 Hrs  T(C): 37.1 (02 Oct 2022 20:49), Max: 37.1 (02 Oct 2022 20:49)  T(F): 98.7 (02 Oct 2022 20:49), Max: 98.7 (02 Oct 2022 20:49)  HR: 94 (02 Oct 2022 22:43) (87 - 100)  BP: 165/105 (02 Oct 2022 22:43) (137/86 - 165/105)  BP(mean): --  RR: 18 (02 Oct 2022 20:49) (18 - 18)  SpO2: 98% (02 Oct 2022 20:49) (98% - 99%)    Parameters below as of 02 Oct 2022 20:49  Patient On (Oxygen Delivery Method): room air    FOCUSED PHYSICAL EXAM:  EXT: Dsg to left foot  NEURO: Alert, oriented X 3  CV: S1 S2, regular    IMAGING:  ACC: [id number] EXAM:  XR FOOT COMP MIN 3 VIEWS LT                        PROCEDURE DATE:  09/21/2022    INTERPRETATION:  LEFT foot  CLINICAL INFORMATION: Infection..  TECHNIQUE: AP,lateral and oblique views.  FINDINGS:  A plantar ulceration with soft tissue swelling of the ball of foot.  The bones and joint spaces are intact.  No fracture or dislocation.  No radiographic evidence of osteomyelitis.  IMPRESSION:  Plantar soft tissue ulceration with soft tissue swelling.  No radiographic evidence of osteomyelitis.  If osteomyelitis is considered  despite conservative therapy, and soft   tissue / bone infection requires further assessment, follow-up MRI   recommended.    PROBLEM: Elevated BP probably due to inadequate med dosing  PLAN:   1. Metoprolol tartrate Injectable 5 robert GRAM(s) IV Push once now  2. Cont enalapril 10 robert GRAM(s) Oral daily    FOLLOW UP / RESULT: effective of med EVENT: Elevated BP trend    BRIEF HPI: 41M who presents to ED with his girlfriend with a chief complaint of worsening left foot wound. Pt states about 1.5-2 months prior, he was walking in work boots when he stepped on a metal screw. Pt admits he didn't feel it initially due to his neuropathy from diabetes and it wasn't until he noticed blood that he saw a wound. Following this, pt arrived at [facility] where he was admitted for 7 days where they did a bedside I&D and given Unasyn and later discharged on IV picc line with more Unasyn. Pt states he previously had a home health nurse changing his foot dressings every other day. Admits he noticed a blistering that worsened to bottom of left foot and that he had a lot of drainage to left 1st interspace with pinpoint wound. Now BP elevated    OBJECTIVE:  Vital Signs Last 24 Hrs  T(C): 37.1 (02 Oct 2022 20:49), Max: 37.1 (02 Oct 2022 20:49)  T(F): 98.7 (02 Oct 2022 20:49), Max: 98.7 (02 Oct 2022 20:49)  HR: 94 (02 Oct 2022 22:43) (87 - 100)  BP: 165/105 (02 Oct 2022 22:43) (137/86 - 165/105)  BP(mean): --  RR: 18 (02 Oct 2022 20:49) (18 - 18)  SpO2: 98% (02 Oct 2022 20:49) (98% - 99%)    Parameters below as of 02 Oct 2022 20:49  Patient On (Oxygen Delivery Method): room air    FOCUSED PHYSICAL EXAM:  EXT: Dsg to left foot  NEURO: Alert, oriented X 3  CV: S1 S2, regular    IMAGING:  ACC: [id number] EXAM:  XR FOOT COMP MIN 3 VIEWS LT                        PROCEDURE DATE:  09/21/2022    INTERPRETATION:  LEFT foot  CLINICAL INFORMATION: Infection..  TECHNIQUE: AP,lateral and oblique views.  FINDINGS:  A plantar ulceration with soft tissue swelling of the ball of foot.  The bones and joint spaces are intact.  No fracture or dislocation.  No radiographic evidence of osteomyelitis.  IMPRESSION:  Plantar soft tissue ulceration with soft tissue swelling.  No radiographic evidence of osteomyelitis.  If osteomyelitis is considered  despite conservative therapy, and soft   tissue / bone infection requires further assessment, follow-up MRI   recommended.    PROBLEM: Elevated BP probably due to inadequate med dosing  PLAN:   1. Metoprolol tartrate Injectable 5 robert GRAM(s) IV Push once now  2. Cont enalapril 10 robert GRAM(s) Oral daily    FOLLOW UP / RESULT: effective of med

## 2022-10-03 VITALS
HEART RATE: 104 BPM | RESPIRATION RATE: 19 BRPM | DIASTOLIC BLOOD PRESSURE: 90 MMHG | TEMPERATURE: 98 F | SYSTOLIC BLOOD PRESSURE: 135 MMHG | OXYGEN SATURATION: 97 %

## 2022-10-03 LAB
ANION GAP SERPL CALC-SCNC: 9 MMOL/L — SIGNIFICANT CHANGE UP (ref 5–17)
BUN SERPL-MCNC: 15 MG/DL — SIGNIFICANT CHANGE UP (ref 7–18)
CALCIUM SERPL-MCNC: 10 MG/DL — SIGNIFICANT CHANGE UP (ref 8.4–10.5)
CHLORIDE SERPL-SCNC: 103 MMOL/L — SIGNIFICANT CHANGE UP (ref 96–108)
CO2 SERPL-SCNC: 25 MMOL/L — SIGNIFICANT CHANGE UP (ref 22–31)
CREAT SERPL-MCNC: 0.83 MG/DL — SIGNIFICANT CHANGE UP (ref 0.5–1.3)
EGFR: 112 ML/MIN/1.73M2 — SIGNIFICANT CHANGE UP
GLUCOSE BLDC GLUCOMTR-MCNC: 123 MG/DL — HIGH (ref 70–99)
GLUCOSE BLDC GLUCOMTR-MCNC: 185 MG/DL — HIGH (ref 70–99)
GLUCOSE SERPL-MCNC: 181 MG/DL — HIGH (ref 70–99)
HCT VFR BLD CALC: 41.8 % — SIGNIFICANT CHANGE UP (ref 39–50)
HGB BLD-MCNC: 12.9 G/DL — LOW (ref 13–17)
MCHC RBC-ENTMCNC: 25.3 PG — LOW (ref 27–34)
MCHC RBC-ENTMCNC: 30.9 GM/DL — LOW (ref 32–36)
MCV RBC AUTO: 82.1 FL — SIGNIFICANT CHANGE UP (ref 80–100)
NRBC # BLD: 0 /100 WBCS — SIGNIFICANT CHANGE UP (ref 0–0)
PLATELET # BLD AUTO: 316 K/UL — SIGNIFICANT CHANGE UP (ref 150–400)
POTASSIUM SERPL-MCNC: 4.1 MMOL/L — SIGNIFICANT CHANGE UP (ref 3.5–5.3)
POTASSIUM SERPL-SCNC: 4.1 MMOL/L — SIGNIFICANT CHANGE UP (ref 3.5–5.3)
RBC # BLD: 5.09 M/UL — SIGNIFICANT CHANGE UP (ref 4.2–5.8)
RBC # FLD: 14.6 % — HIGH (ref 10.3–14.5)
SODIUM SERPL-SCNC: 137 MMOL/L — SIGNIFICANT CHANGE UP (ref 135–145)
WBC # BLD: 7.86 K/UL — SIGNIFICANT CHANGE UP (ref 3.8–10.5)
WBC # FLD AUTO: 7.86 K/UL — SIGNIFICANT CHANGE UP (ref 3.8–10.5)

## 2022-10-03 PROCEDURE — 99232 SBSQ HOSP IP/OBS MODERATE 35: CPT

## 2022-10-03 PROCEDURE — 99233 SBSQ HOSP IP/OBS HIGH 50: CPT

## 2022-10-03 PROCEDURE — 99239 HOSP IP/OBS DSCHRG MGMT >30: CPT | Mod: GC

## 2022-10-03 RX ORDER — SITAGLIPTIN AND METFORMIN HYDROCHLORIDE 500; 50 MG/1; MG/1
1 TABLET, FILM COATED ORAL
Qty: 0 | Refills: 0 | DISCHARGE

## 2022-10-03 RX ORDER — INSULIN GLARGINE 100 [IU]/ML
20 INJECTION, SOLUTION SUBCUTANEOUS
Qty: 0 | Refills: 0 | DISCHARGE

## 2022-10-03 RX ORDER — INSULIN GLARGINE 100 [IU]/ML
48 INJECTION, SOLUTION SUBCUTANEOUS
Qty: 1440 | Refills: 0
Start: 2022-10-03 | End: 2022-11-01

## 2022-10-03 RX ORDER — INSULIN LISPRO 100/ML
16 VIAL (ML) SUBCUTANEOUS
Qty: 1440 | Refills: 0
Start: 2022-10-03 | End: 2022-11-01

## 2022-10-03 RX ORDER — METFORMIN HYDROCHLORIDE 850 MG/1
1 TABLET ORAL
Qty: 7 | Refills: 0
Start: 2022-10-03 | End: 2022-10-09

## 2022-10-03 RX ORDER — INSULIN LISPRO 100/ML
10 VIAL (ML) SUBCUTANEOUS
Qty: 0 | Refills: 0 | DISCHARGE

## 2022-10-03 RX ORDER — PIPERACILLIN AND TAZOBACTAM 4; .5 G/20ML; G/20ML
3 INJECTION, POWDER, LYOPHILIZED, FOR SOLUTION INTRAVENOUS
Qty: 0 | Refills: 0 | DISCHARGE
Start: 2022-10-03 | End: 2025-11-04

## 2022-10-03 RX ORDER — PIPERACILLIN AND TAZOBACTAM 4; .5 G/20ML; G/20ML
3 INJECTION, POWDER, LYOPHILIZED, FOR SOLUTION INTRAVENOUS
Qty: 504 | Refills: 0
Start: 2022-10-03 | End: 2022-11-13

## 2022-10-03 RX ORDER — INSULIN GLARGINE 100 [IU]/ML
52 INJECTION, SOLUTION SUBCUTANEOUS
Qty: 1560 | Refills: 0
Start: 2022-10-03 | End: 2022-11-01

## 2022-10-03 RX ORDER — INSULIN LISPRO 100/ML
12 VIAL (ML) SUBCUTANEOUS
Qty: 1080 | Refills: 0
Start: 2022-10-03 | End: 2022-11-01

## 2022-10-03 RX ORDER — SENNA PLUS 8.6 MG/1
2 TABLET ORAL
Qty: 60 | Refills: 0
Start: 2022-10-03 | End: 2022-11-01

## 2022-10-03 RX ADMIN — Medication 2: at 11:44

## 2022-10-03 RX ADMIN — HEPARIN SODIUM 5000 UNIT(S): 5000 INJECTION INTRAVENOUS; SUBCUTANEOUS at 06:46

## 2022-10-03 RX ADMIN — Medication 16 UNIT(S): at 11:44

## 2022-10-03 RX ADMIN — INSULIN GLARGINE 52 UNIT(S): 100 INJECTION, SOLUTION SUBCUTANEOUS at 08:49

## 2022-10-03 RX ADMIN — Medication 16 UNIT(S): at 08:06

## 2022-10-03 RX ADMIN — PIPERACILLIN AND TAZOBACTAM 25 GRAM(S): 4; .5 INJECTION, POWDER, LYOPHILIZED, FOR SOLUTION INTRAVENOUS at 06:41

## 2022-10-03 RX ADMIN — Medication 10 MILLIGRAM(S): at 06:40

## 2022-10-03 RX ADMIN — CHLORHEXIDINE GLUCONATE 1 APPLICATION(S): 213 SOLUTION TOPICAL at 11:53

## 2022-10-03 NOTE — PROGRESS NOTE ADULT - ASSESSMENT
A:  - DM uncontrolled  - Left plantar foot wound; Left 1st interspace wound, abscess  - Left 2nd metatarsal osteomyelitis        P:   Patient evaluated and Chart reviewed   Discussed diagnosis and treatment with patient  Plan for conservative care for osteomyelitis, with long term abx.  Dressed wound with wet to dry dressing  Applied betadine paint to 1st interspace with mild maceration prior to applying wound vac  Pt to be d/c w/ wound vac pending authorization   Continue with IV antibiotics As Per ID recc; pt to be on zosyn for 6 weeks via picc line   Encouraged pt to keep left foot elevated as much as possible  Pt to f/o at [address] [facility], [address] phone [phone] w/ Dr. Serrano within 1 week of d/c   Tunnel measurement for Case Management is 0.2 x 0.2 cm.  Discussed importance of daily foot examinations and proper shoe gear and to importance of lower Fasting Blood Glucose levels, keep FS <180 for optimal wound healing. Patient is at risk for non healing, delayed healing, worsening infection, amputation. Stressed importance of glycemic control and outpatient follow-up. Patient demonstrated verbal understanding.   Podiatry to follow while in house.  Discussed with Attending Dr. Serrano     WC: Wound vac changes Monday, Wednesday, Friday. Wound vac to be used continuously black foam to plantar wound, bridged dorsally through first interspace with dhiraj pad on dorsum of foot. (no black foam in interspace wound. Cover with Tegaderm; DSD and Ace over vac; vac pressure continuous at 126 mmHg

## 2022-10-03 NOTE — PROGRESS NOTE ADULT - PROVIDER SPECIALTY LIST ADULT
Endocrinology
Infectious Disease
Internal Medicine
Internal Medicine
Podiatry
Podiatry
Infectious Disease
Podiatry
Endocrinology
Endocrinology
Infectious Disease
Internal Medicine
Podiatry
Internal Medicine

## 2022-10-03 NOTE — PROGRESS NOTE ADULT - SUBJECTIVE AND OBJECTIVE BOX
Subjective:  Chart Notes, Work list Manager, and fingersticks reviewed. Patient reports of eating well. Denies any major complaints.     Medications (Standing):  atorvastatin 40 milliGRAM(s) Oral at bedtime  chlorhexidine 2% Cloths 1 Application(s) Topical daily  dextrose 5%. 1000 milliLiter(s) (50 mL/Hr) IV Continuous <Continuous>  dextrose 5%. 1000 milliLiter(s) (100 mL/Hr) IV Continuous <Continuous>  dextrose 50% Injectable 25 Gram(s) IV Push once  dextrose 50% Injectable 12.5 Gram(s) IV Push once  dextrose 50% Injectable 25 Gram(s) IV Push once  enalapril 10 milliGRAM(s) Oral daily  glucagon  Injectable 1 milliGRAM(s) IntraMuscular once  heparin   Injectable 5000 Unit(s) SubCutaneous every 8 hours  influenza   Vaccine 0.5 milliLiter(s) IntraMuscular once  insulin glargine Injectable (LANTUS) 52 Unit(s) SubCutaneous every morning  insulin lispro (ADMELOG) corrective regimen sliding scale   SubCutaneous three times a day before meals  insulin lispro (ADMELOG) corrective regimen sliding scale   SubCutaneous at bedtime  insulin lispro Injectable (ADMELOG) 16 Unit(s) SubCutaneous three times a day before meals  metoprolol tartrate Injectable 5 milliGRAM(s) IV Push once  piperacillin/tazobactam IVPB.. 3.375 Gram(s) IV Intermittent every 8 hours  senna 2 Tablet(s) Oral at bedtime    Medications (PRN):  acetaminophen     Tablet .. 650 milliGRAM(s) Oral every 6 hours PRN Temp greater or equal to 38C (100.4F), Mild Pain (1 - 3)  dextrose Oral Gel 15 Gram(s) Oral once PRN Blood Glucose LESS THAN 70 milliGRAM(s)/deciliter  oxyCODONE    IR 5 milliGRAM(s) Oral every 8 hours PRN Moderate Pain (4 - 6)  sodium chloride 0.9% lock flush 10 milliLiter(s) IV Push every 1 hour PRN Pre/post blood products, medications, blood draw, and to maintain line patency    Review of Systems:  Constitutional: No fever  Eyes: No blurry vision  HEENT: No pain  Cardiovascular: No chest pain, palpitations  Respiratory: No SOB, no cough  GI: No nausea, vomiting, abdominal pain  : No dysuria  Skin: no rash  Psych: no depression  Extremities: No pain in Left foot  Endocrine: no polyuria, polydipsia    Physical Examination:  VITALS: T(C): 36.7 (10-03-22 @ 14:05)  T(F): 98 (10-03-22 @ 14:05), Max: 98.7 (10-02-22 @ 20:49)  HR: 104 (10-03-22 @ 14:05) (82 - 104)  BP: 135/90 (10-03-22 @ 14:05) (135/90 - 165/105)  RR:  (18 - 19)  SpO2:  (97% - 98%)    Constitutional: Not acute distress, non- ill- appearing, obese   HEENT: Moist mucous membranes  Neck:  No JVD, bruits or thyromegaly, No thyroid nodules palpable, no LAD  Respiratory:  Respiratory effort normal, lungs clear to ausculation, without rales or rhonchi  Cardiovascular:  Regular heart rate, normal S1 and S2 sounds, without murmur, rub or gallop.  Gastrointestinal: Soft, non tender without hepatosplenomegaly and masses, mild +ve abdominal obesity  Extremities:  Bandage on the left foot present. Sensation intact in R feet, no cyanosis, clubbing or edema, positive pedal pulses +ve in right foot. Venous stasis and varicose veins +ve in lower extremities.  Neurological:  Oriented to person, place and time,     Capillary Blood glucose:  POCT Blood Glucose.: 185 mg/dL (03 Oct 2022 11:28)  POCT Blood Glucose.: 123 mg/dL (03 Oct 2022 08:00)  POCT Blood Glucose.: 133 mg/dL (02 Oct 2022 21:41)  POCT Blood Glucose.: 156 mg/dL (02 Oct 2022 16:38)    10-03  137  |  103  |  15  ----------------------------<  181<H>  4.1   |  25  |  0.83  eGFR: 112  Ca    10.0      10-03    Islet Cell Antibody: <1:4: INTERPRETIVE INFORMATION: Islet Cell Ab, IgG  Lipid Profile (09.27.22 @ 07:59)    Cholesterol, Serum: 138 mg/dL    Triglycerides, Serum: 84 mg/dL    HDL Cholesterol, Serum: 41 mg/dL    Non HDL Cholesterol: 97: Patients Atherosclerotic Cardiovascular Disease (ASCVD) Risk  Optimal Level (mg/dL)  LDL Cholesterol (LDL-C)  All Patients                                < 100  ASCVD at Very High Risk1    < 70  Non-HDL Cholesterol (Non-HDL-C)  All Patients                       < 130  ASCVD at Very High Risk1   < 100      Assessment and Plan:  41 year old male with hx of T2DM  HTN, HLD, Obesity presented with a left foot wound, admitted for left foot infection and to rule out osteomyelitis of left foot s/p excisional debridement ulcer of left foot. Endocrinology is following for glycemic management.     1) Poorly Controlled Diabetes Mellitus:  HBA1C above goal 12.3%  [id number] and Anti-islet cell antibodies are negative, so patient most likely has type 2 diabetes.   Home regimen: Recently started basal bolus regimen and stopped metformin, Januvia and glipizide  Patient reports good appetite   In hospital, postprandial blood sugars are at goal now on current basal bolus regimen    Inpatient Recommendations:    Basal Insulin:   Continue Glargine ( Lantus) 52 units in the morning    Nutritional Insulin:  Continue 16 units of Lispro ( Admelog) units with meals, (Hold if NPO or eating <50% of meals)    Correctional Insulin:  Continue moderate Lispro ( Admelog) correctional scale with meals and bedtime.     Oral Diabetes Medications:  None in the hospital    Discharge Recommendations:  1) Lantus 48 units daily   2) Humalog 12 units with meals  3) Metformin 500 mg daily for week and then increase 1000 mg following week if better tolerated. Advised to take metformin with meals.    Please ensure patient has all insulin supplies including glucometer, test strips, lancets, alcohol pads, insulin PEN, PEN needles on discharge    Discussed with patient length regarding metformin, its GI side effects. Discussed that if he goes for surgery or gets an IV contrast he needs to stop taking metformin for 2 days prior to the procedure as it can affect kidneys. Explained that after he starts metformin, his insulin doses may need to be adjusted as his insulin resistance will decrease with use of metformin. Monitor Fasting  Blood glucose daily. If BS decreasing to <100, then patient should decrease Lantus to 45 units. Similarly he can adjust his Humalog with 2 units increase or decrease based upon the blood sugars. Stressed on importance of endocrine follow up as outpatient for diabetes management and also to do blood work to evaluate for GFR as patient will be taking metformin at home. Patient verbalized understanding and agrees with the plan.     2) Hyperlipidemia:  Patient  was told by his PCP that his cholesterol was very high.  Therefore, Atorvastatin 20 mg was increased to 40 mg daily recently.   Lipid profile noted. LDL is at goal     Recommendations:  Continue Atorvastatin 40 mg daily    Plan discussed in detail with patient and primary team    Carisa Marr MD   Endocrinology, Diabetes and Metabolism   Available on MS. teams

## 2022-10-03 NOTE — PROGRESS NOTE ADULT - NS ATTEND AMEND GEN_ALL_CORE FT
Patient seen/evaluated at bedside on 9/26/22. I agree with the resident progress note/outlined plan of care. My independent findings and conclusions are documented.    S: Patient denies ORTEGA, Chest pain, LH, palpitations, diaphoresis, faintness. He is able to ambulate > 2 flights of stairs without stopping and ambulates briskly>1 city block    + Covid exposure history overnight. COvid test from today 9/26/ 22 is negative    vitals reviewed  Vital Signs Last 24 Hrs  T(C): 36.7 (26 Sep 2022 13:42), Max: 37.3 (25 Sep 2022 21:03)  T(F): 98 (26 Sep 2022 13:42), Max: 99.2 (25 Sep 2022 21:03)  HR: 82 (26 Sep 2022 13:42) (81 - 104)  BP: 152/96 (26 Sep 2022 13:42) (138/92 - 152/96)  RR: 17 (26 Sep 2022 13:42) (17 - 18)  SpO2: 98% (26 Sep 2022 13:42) (96% - 98%)    AAOx3 NAD  no carotid bruits, no elevation in JVD  CTAb/l no accessory muscle use  soft, NT, ND, + BS  decreased right DP     Briefly, this is a 41M PMH DM, HTN, HLD, p/w L foot wound found to have osteomyelitis planned for OR  irrigation/ debridement. Superficial wound culture with multiple organisms.    1. osteomyelitis left foot  2. DM T II- uncontrolled with skin complications on long term insulin  3. HTN  4. HLD    0.1% Escalante score, Class II risk (for Diagnosis of DM T II on insulin). METs >4. Patient is at intermediate risk for low to intermediate risk procedure. No clinical evidence of acute cardiovascular process. Would proceed to planned intervention of irrigation/debridement without further evaluation    -continue w/ zosyn, appreciate ID and podiatry input and recommendations. May potentially require wound vac  -dose insulin lantus 30 units today- increase to 42 units tomorrow  -rest of plan as above
Patient denies any pain or fever at this time. Purulent drainage from left foot between first and 2nd digit drained at bedside by podiatry. Patient states he was on IV abx up to 1 week ago and developed left intertriginous wound while on IV abx. Left foot wound dressing clean and dry at this time, but see emmaceration of skin with possible gangrenous changes. Patient reports claudication in left leg with walking. ADRYAN limited by calcifications. Left DP and PT pulses non-palpable. Will obtain vascular consult. Continue on vancomycin and cefepime for hospital organisms at this time. Pending MR Left Foot to assess for Osteomyelitis. Check Vanc trough before 4th dose. Hgb A1c, Diabetes uncontrolled, endocrine consulted, adjust insulin for blood sugars..
Patient reports no pain today. Reports serosanguinous discharge from left foot. Left foot with skin dehiscence. Left Foot MRI with abscess and T2 intensity in 1-3 metarsal which could be osteomyelitis vs. stress changes. F/u with ID and Podiatry for further management. Continue on vancoymcin and cefepime. Check Vanc trough this evening before dose. Fingersticks increased likely in the setting of infection. Endocrine following, discussed with Dr. Marr, will increase insulin dose. Podiatry planning for I&D on Monday.
Patient seen/evaluated at bedside on 9/28/22. I agree with the resident progress note/outlined plan of care. My independent findings and conclusions are documented.    denies fevers/chills/diarrhea. Wound vac in place    AAOx3 NAD  no carotid bruits, no elevation in JVD  CTAb/l no accessory muscle use  soft, NT, ND, + BS  decreased right DP     Briefly, this is a 41M PMH DM, HTN, HLD, p/w L foot wound found to have osteomyelitis planned for OR  irrigation/ debridement. Superficial wound culture with multiple organisms.    1. osteomyelitis left foot  2. DM T II- uncontrolled with skin complications on long term insulin  3. HTN  4. HLD      -continue w/ zosyn, appreciate ID and podiatry input and recommendations. May potentially require wound vac  -s/p I&D well tolerated  -planned for PICC line  -rest of plan as above.
Patient seen/evaluated at bedside. I agree with the NP progress note/outlined plan of care. My independent findings and conclusions are documented.    c/o left forearm pain    AAOx3 NAD  no carotid bruits, no elevation in JVD  CTAb/l no accessory muscle use  +significant tenderness, warmth and mild erythema at left forearm with palpable cord, + PICC in Left upper arm  soft, NT, ND, + BS  decreased right DP     Briefly, this is a 41M PMH DM, HTN, HLD, p/w L foot wound found to have osteomyelitis s/p I&D in OR planned for 6 week course of Zosyn. Superficial wound culture polymicrobial    1. osteomyelitis left foot s/p picc line  2. LUE thrombophlebitis  3. DM T II- uncontrolled with skin complications on long term insulin  4. HTN  5. HLD      -continue w/ zosyn, appreciate ID and podiatry input and recommendations- now s/p picc line insertion. Podiatry indicates will require wound vac on discharge  -noted w/ findings consistent with thrombophlebitis. dose vancomycin x1, toradol- low dose 15mg IV x 1 dose (monitor renal function) warm compress to Left forearm, doppler in light of left sided PICC   -s/p I&D well tolerated  -case management arranging for wound vac on discharge  -rest of plan as above.
this is for service provided on 10/3- patient was stable and plan was to go home but was unable to leave due to home infusion approval delay.  c.w iv atbx
Patient seen/evaluated at bedside. I agree with the resident progress note/outlined plan of care. My independent findings and conclusions are documented.    AAOx3 NAD  no carotid bruits, no elevation in JVD  CTAb/l no accessory muscle use  soft, NT, ND, + BS  decreased right DP     Briefly, this is a 41M PMH DM, HTN, HLD, p/w L foot wound found to have osteomyelitis s/p I&D in OR planned for 6 week course of abx. Superficial wound culture with multiple organisms.    1. osteomyelitis left foot s/p picc line  2. DM T II- uncontrolled with skin complications on long term insulin  3. HTN  4. HLD      -continue w/ zosyn, appreciate ID and podiatry input and recommendations. May potentially require wound vac  -s/p I&D well tolerated  -s/p picc line, podiatry will inform need for wound vac on discharge  -rest of plan as above.

## 2022-10-03 NOTE — PROGRESS NOTE ADULT - REASON FOR ADMISSION
Left foot wound r/o Osteomyelitis

## 2022-10-03 NOTE — PROGRESS NOTE ADULT - NS ATTEND OPT1 GEN_ALL_CORE
I independently performed the documented:
I independently performed the documented:
I attest my time as attending is greater than 50% of the total combined time spent on qualifying patient care activities by the PA/NP and attending.
I attest my time as attending is greater than 50% of the total combined time spent on qualifying patient care activities by the PA/NP and attending.
I independently performed the documented:
I attest my time as attending is greater than 50% of the total combined time spent on qualifying patient care activities by the PA/NP and attending.
I independently performed the documented:

## 2022-10-03 NOTE — CHART NOTE - NSCHARTNOTESELECT_GEN_ALL_CORE
Elevated BP trend/Event Note
Event Note
Internal medicine/Event Note
Endocrine Supplemental Note/Event Note
Nutrition Services
Room mate Covid+/Event Note

## 2022-10-03 NOTE — PROGRESS NOTE ADULT - PROBLEM SELECTOR PROBLEM 1
Acute osteomyelitis 47 yo fem with acute cholecystitis gallstones, possible common bile duct stone  -Will admit her  -Will repeat LFts. If LFTs keep rising , MRCP would be ordered to r/o CBD stone. If Lfts don't rise likely she passed a stone through CBD causing a bump of AST/ALT, lipase  -She will need lap cholecystectomy on this admission  -IV Abx

## 2022-10-03 NOTE — PROGRESS NOTE ADULT - SUBJECTIVE AND OBJECTIVE BOX
NP Note discussed with  Primary Attending    Patient is a 41y old  Male who presents with a chief complaint of Left foot wound r/o Osteomyelitis (03 Oct 2022 10:45)      INTERVAL HPI/OVERNIGHT EVENTS: no new complaints    MEDICATIONS  (STANDING):  atorvastatin 40 milliGRAM(s) Oral at bedtime  chlorhexidine 2% Cloths 1 Application(s) Topical daily  dextrose 5%. 1000 milliLiter(s) (50 mL/Hr) IV Continuous <Continuous>  dextrose 5%. 1000 milliLiter(s) (100 mL/Hr) IV Continuous <Continuous>  dextrose 50% Injectable 25 Gram(s) IV Push once  dextrose 50% Injectable 12.5 Gram(s) IV Push once  dextrose 50% Injectable 25 Gram(s) IV Push once  enalapril 10 milliGRAM(s) Oral daily  glucagon  Injectable 1 milliGRAM(s) IntraMuscular once  heparin   Injectable 5000 Unit(s) SubCutaneous every 8 hours  influenza   Vaccine 0.5 milliLiter(s) IntraMuscular once  insulin glargine Injectable (LANTUS) 52 Unit(s) SubCutaneous every morning  insulin lispro (ADMELOG) corrective regimen sliding scale   SubCutaneous three times a day before meals  insulin lispro (ADMELOG) corrective regimen sliding scale   SubCutaneous at bedtime  insulin lispro Injectable (ADMELOG) 16 Unit(s) SubCutaneous three times a day before meals  metoprolol tartrate Injectable 5 milliGRAM(s) IV Push once  piperacillin/tazobactam IVPB.. 3.375 Gram(s) IV Intermittent every 8 hours  senna 2 Tablet(s) Oral at bedtime    MEDICATIONS  (PRN):  acetaminophen     Tablet .. 650 milliGRAM(s) Oral every 6 hours PRN Temp greater or equal to 38C (100.4F), Mild Pain (1 - 3)  dextrose Oral Gel 15 Gram(s) Oral once PRN Blood Glucose LESS THAN 70 milliGRAM(s)/deciliter  oxyCODONE    IR 5 milliGRAM(s) Oral every 8 hours PRN Moderate Pain (4 - 6)  sodium chloride 0.9% lock flush 10 milliLiter(s) IV Push every 1 hour PRN Pre/post blood products, medications, blood draw, and to maintain line patency      __________________________________________________  REVIEW OF SYSTEMS:    CONSTITUTIONAL: No fever,   EYES: no acute visual disturbances  NECK: No pain or stiffness  RESPIRATORY: No cough; No shortness of breath  CARDIOVASCULAR: No chest pain, no palpitations  GASTROINTESTINAL: No pain. No nausea or vomiting; No diarrhea   NEUROLOGICAL: No headache or numbness, no tremors  MUSCULOSKELETAL: No joint pain, no muscle pain  GENITOURINARY: no dysuria, no frequency, no hesitancy  PSYCHIATRY: no depression , no anxiety  ALL OTHER  ROS negative        Vital Signs Last 24 Hrs  T(C): 36.7 (03 Oct 2022 14:05), Max: 37.1 (02 Oct 2022 20:49)  T(F): 98 (03 Oct 2022 14:05), Max: 98.7 (02 Oct 2022 20:49)  HR: 104 (03 Oct 2022 14:05) (82 - 104)  BP: 135/90 (03 Oct 2022 14:05) (135/90 - 165/105)  BP(mean): --  RR: 19 (03 Oct 2022 14:05) (18 - 19)  SpO2: 97% (03 Oct 2022 14:05) (97% - 98%)    Parameters below as of 03 Oct 2022 14:05  Patient On (Oxygen Delivery Method): room air        ________________________________________________  PHYSICAL EXAM:  well developed  GENERAL: NAD  HEENT: Normocephalic;  conjunctivae and sclerae clear; moist mucous membranes;   NECK : supple  CHEST/LUNG: Clear to auscultation bilaterally with good air entry   HEART: S1 S2  regular; no murmurs, gallops or rubs  ABDOMEN: Soft, Nontender, Nondistended; Bowel sounds present  EXTREMITIES: Left foot drsg c/d/i, no cyanosis; no edema; no calf tenderness  SKIN: warm and dry; no rash  NERVOUS SYSTEM:  Awake and alert; Oriented  to place, person and time ; no new deficits    _________________________________________________  LABS:                        12.9   7.86  )-----------( 316      ( 03 Oct 2022 11:50 )             41.8     10-03    137  |  103  |  15  ----------------------------<  181<H>  4.1   |  25  |  0.83    Ca    10.0      03 Oct 2022 11:50          CAPILLARY BLOOD GLUCOSE      POCT Blood Glucose.: 185 mg/dL (03 Oct 2022 11:28)  POCT Blood Glucose.: 123 mg/dL (03 Oct 2022 08:00)  POCT Blood Glucose.: 133 mg/dL (02 Oct 2022 21:41)  POCT Blood Glucose.: 156 mg/dL (02 Oct 2022 16:38)    RADIOLOGY & ADDITIONAL TESTS:    < from: US Duplex Venous [facility], Left (10.01.22 @ 11:20) >    ACC: [id number] EXAM:  US DPLX UPR EXT VEINS LTD LT                          PROCEDURE DATE:  10/01/2022          INTERPRETATION:  CLINICAL INFORMATION: Left forearm swelling at left   upper extremity PICC site.    COMPARISON: None available.    TECHNIQUE: Duplex sonography of the LEFT UPPER extremity veins with color   and spectral Doppler, with and without compression.    FINDINGS:    The left internal jugular, subclavian, axillary, brachial, radial, and   ulnar veins are patent and compressible where applicable.  The basilic   (superficial vein) is not imaged. Intraluminal thrombus identified within   the left cephalic (superficial) vein.    Doppler examination shows normal spontaneous and phasic flow.    IMPRESSION:  No evidence of left upper extremity deep venous thrombosis.    Superficial thrombophlebitis within the left cephalic vein    < end of copied text >      < from: MR Foot No Cont, Left (09.23.22 @ 11:28) >    ACC: [id number] EXAM:  MR FOOT LT                          PROCEDURE DATE:  09/23/2022          INTERPRETATION:  EXAMINATION: MRI of the left forefoot without contrast    CLINICAL INFORMATION: Left foot wound. Evaluate for osteomyelitis.    TECHNIQUE: Multiplanar, multisequential MR imaging was performed.    FINDINGS: There is a wound along the plantar aspect of the foot   underlying the second metatarsophalangeal joint with adjacent cellulitic   change. There is high T2 signal throughout the second digit proximal   phalanx and throughout the entirety of the second digit metatarsal   (sparing only its base). There is also high T2 signal throughout the   third digit proximal phalanx and metatarsal head of the third digit as   well as within the medial aspect of the first metatarsal head. The   plantar cutaneous wound communicates with a multiloculated collection in   the first intermetatarsal space which extends slightly laterally and   overlies the second digit metatarsophalangeal joint and second digit   metatarsal, surrounding the second digit extensor tendon. There is also a   smaller correction in the soft tissues overlying the third digit   metatarsal, surrounding the third digit extensor tendon. There is a   moderate-sized second metatarsophalangeal joint effusion. There are small   effusions at the first metatarsal-phalangeal joint and third   metatarsophalangeal joint.    There is nonspecific dorsal subcutaneous soft tissue edema. There is   diffuse fatty atrophy and high T2 signal of musculature, consistent with   denervation. There are mild arthritic changes at the navicular-cuneiform   articulations.    IMPRESSION: Wound along the plantar aspect of the foot at the level of   the second MPJ. This communicates with a multiloculated collection that   is present in the first metatarsal space and dorsal soft tissues   overlying the second digit, consistent with an abscess. There is also a   smaller collection in the dorsal soft tissues overlying the third digit.  Areas of marrow signal alteration within the second digit metatarsal,   second digit proximal phalanx, the third digit proximal phalanx, the head   of the third metatarsal, and the head of the first metatarsal.   Differential diagnosis for the signal alteration at these sites includes   osteomyelitis and stress reaction.    --- End of Report ---    < end of copied text >  Imaging Personally Reviewed:  YES/NO    Consultant(s) Notes Reviewed:   YES/ No    Care Discussed with Consultants :     Plan of care was discussed with patient and /or primary care giver; all questions and concerns were addressed and care was aligned with patient's wishes.

## 2022-10-03 NOTE — CHART NOTE - NSCHARTNOTEFT_GEN_A_CORE
Reassessment:   Nutrition note/follow up: Patient is a 41y old  Male who presents with a chief complaint of Left foot wound r/o Osteomyelitis (03 Oct 2022 10:45). Chart review pt visited, pt reports good PO intake, following diet prescribed by MD, no s/s of N/V/D/C reported. RD remains available.      Factors impacting intake: [x ] none [ ] nausea  [ ] vomiting [ ] diarrhea [ ] constipation  [ ]chewing problems [ ] swallowing issues  [ ] other:     Diet Prescription: Diet, Consistent Carbohydrate w/Evening Snack (09-27-22 @ 03:54)    Intake:     Daily     Daily   % Weight Change    Pertinent Medications: MEDICATIONS  (STANDING):  atorvastatin 40 milliGRAM(s) Oral at bedtime  chlorhexidine 2% Cloths 1 Application(s) Topical daily  dextrose 5%. 1000 milliLiter(s) (50 mL/Hr) IV Continuous <Continuous>  dextrose 5%. 1000 milliLiter(s) (100 mL/Hr) IV Continuous <Continuous>  dextrose 50% Injectable 25 Gram(s) IV Push once  dextrose 50% Injectable 12.5 Gram(s) IV Push once  dextrose 50% Injectable 25 Gram(s) IV Push once  enalapril 10 milliGRAM(s) Oral daily  glucagon  Injectable 1 milliGRAM(s) IntraMuscular once  heparin   Injectable 5000 Unit(s) SubCutaneous every 8 hours  influenza   Vaccine 0.5 milliLiter(s) IntraMuscular once  insulin glargine Injectable (LANTUS) 52 Unit(s) SubCutaneous every morning  insulin lispro (ADMELOG) corrective regimen sliding scale   SubCutaneous three times a day before meals  insulin lispro (ADMELOG) corrective regimen sliding scale   SubCutaneous at bedtime  insulin lispro Injectable (ADMELOG) 16 Unit(s) SubCutaneous three times a day before meals  metoprolol tartrate Injectable 5 milliGRAM(s) IV Push once  piperacillin/tazobactam IVPB.. 3.375 Gram(s) IV Intermittent every 8 hours  senna 2 Tablet(s) Oral at bedtime    MEDICATIONS  (PRN):  acetaminophen     Tablet .. 650 milliGRAM(s) Oral every 6 hours PRN Temp greater or equal to 38C (100.4F), Mild Pain (1 - 3)  dextrose Oral Gel 15 Gram(s) Oral once PRN Blood Glucose LESS THAN 70 milliGRAM(s)/deciliter  oxyCODONE    IR 5 milliGRAM(s) Oral every 8 hours PRN Moderate Pain (4 - 6)  sodium chloride 0.9% lock flush 10 milliLiter(s) IV Push every 1 hour PRN Pre/post blood products, medications, blood draw, and to maintain line patency    Pertinent Labs: 10-03 Na137 mmol/L Glu 181 mg/dL<H> K+ 4.1 mmol/L Cr  0.83 mg/dL BUN 15 mg/dL 09-27 Chol 138 mg/dL LDL --    HDL 41 mg/dL Trig 84 mg/dL     CAPILLARY BLOOD GLUCOSE      POCT Blood Glucose.: 185 mg/dL (03 Oct 2022 11:28)  POCT Blood Glucose.: 123 mg/dL (03 Oct 2022 08:00)  POCT Blood Glucose.: 133 mg/dL (02 Oct 2022 21:41)  POCT Blood Glucose.: 156 mg/dL (02 Oct 2022 16:38)    Skin:     Estimated Needs:   [ ] no change since previous assessment  [ ] recalculated:       Previous Nutrition Diagnosis:   [ ] Altered GI function  [ ]Inadequate Oral Intake [ ] Swallowing Difficulty   [ ] Altered nutrition related labs [ ] Increased Nutrient Needs [ ] Overweight/Obesity   [ ] Unintended Weight Loss [x ] Food & Nutrition Related Knowledge Deficit [ ] Malnutrition   [ ] Other:     Nutrition Diagnosis is [x ] ongoing  [ ] resolved [ ] not applicable     New Nutrition Diagnosis: [ ] not applicable       Interventions:   Recommend  [ ] Change Diet To:  [ ] Nutrition Supplement  [ ] Nutrition Support  [ x] Other: Continue with Consistent CHO diet, RD remains available MD to monitor.     Monitoring and Evaluation:   [ ] PO intake [ x ] Tolerance to diet prescription [ x ] weights [ x ] labs[ x ] follow up per protocol  [ ] other:

## 2022-10-03 NOTE — PROGRESS NOTE ADULT - PROBLEM SELECTOR PLAN 7
Plan to continue IV Zosyn until 11/7/22  will need wound vacc (F/u podiatry reccs)  NCM to follow for home care services Plan to continue IV Zosyn until 11/7/22  will need wound vacc (F/u podiatry reccs)  NCM  following for home care services

## 2022-10-03 NOTE — PROGRESS NOTE ADULT - PROBLEM SELECTOR PLAN 2
wound vacc recommendations pending   plan as above wound vacc at bedside  Home IV infusion arrangements complete  Home wound care nursing arrangements in progress  Left surgical shoe provided   For discharge to home likely tomorrow (10/4)

## 2022-10-03 NOTE — PROGRESS NOTE ADULT - SUBJECTIVE AND OBJECTIVE BOX
Podiatry Interval: Pt is seen bedside in no acute distress. Pt is in a droplet precaution exposed room. Denies pain to left foot and states he hasn't been walking. Patient is anxious to go home, reports he does not sleep well. Denies any overnight acute events. Denies further constitutional symptoms. Denies further pedal complaints.     Podiatry HPI: Pt is a 41M who presents to ED with his girlfriend with a chief complaint of worsening left foot wound. Pt states about 1.5-2 months prior, he was walking in work boots when he stepped on a metal screw. Pt admits he didn't feel it initially due to his neuropathy from diabetes and it wasn't until he noticed blood that he saw a wound. Following this, pt arrived at [facility] where he was admitted for 7 days where they did a bedside I&D and given Unasyn and later discharged on IV picc line with more unasyn. Pt states he previously had a home health nurse changing his foot dressings every other day. Admits he noticed a blistering that worsened to bottom of left foot and that he had a lot of drainage to left 1st interspace with pinpoint wound. Pt states today, he experienced chills and burning pain that worsened when walking. Denies further constitutional symptoms. Denies recent acute trauma. Denies further pedal complaints.     Patient is a 41y old  Male who presents with a chief complaint of Left foot wound r/o Osteomyelitis (21 Sep 2022 18:46)      HPI:  Patient is a 40 yo male with hx of DM (last A1c >10), HTN, HLD presents with a left foot wound. The left foot wound initially started 6 weeks ago when the patient had a screw puncture his sole of his slipper and then suffered a wound on the bottom of the left foot. Patient stated he then when to [facility] and was admitted for 7 days in which he received Unasyn and then was later discharged with PICC line for additional 2-3 weeks of IV unasyn. Patient had wound care nurse come to his home, however started to develop blistering of the left foot and enlargement of the wound on the sole of his foot. Wounds also developed between the left great toe and 2nd toe. Patient reports purulent discharge and pain. Pain is in the left foot, 10/10 burning, constant and worsened with ambulation. Patient reports fevers and chills. Patient also states he has numbness in the 2nd toe on the left side and tingling. Patient denies chest pain, sob or palpitations. Denies syncope, dizziness.       Patient admits to  (-) Fevers, (-) Chills, (-) Nausea, (-) Vomiting, (-) Shortness of Breath (-) calf pain (-) chest pain     Medications acetaminophen     Tablet .. 650 milliGRAM(s) Oral every 6 hours PRN  atorvastatin 40 milliGRAM(s) Oral at bedtime  chlorhexidine 2% Cloths 1 Application(s) Topical daily  dextrose 5%. 1000 milliLiter(s) IV Continuous <Continuous>  dextrose 5%. 1000 milliLiter(s) IV Continuous <Continuous>  dextrose 50% Injectable 25 Gram(s) IV Push once  dextrose 50% Injectable 12.5 Gram(s) IV Push once  dextrose 50% Injectable 25 Gram(s) IV Push once  dextrose Oral Gel 15 Gram(s) Oral once PRN  enalapril 10 milliGRAM(s) Oral daily  glucagon  Injectable 1 milliGRAM(s) IntraMuscular once  heparin   Injectable 5000 Unit(s) SubCutaneous every 8 hours  influenza   Vaccine 0.5 milliLiter(s) IntraMuscular once  insulin glargine Injectable (LANTUS) 52 Unit(s) SubCutaneous every morning  insulin lispro (ADMELOG) corrective regimen sliding scale   SubCutaneous three times a day before meals  insulin lispro (ADMELOG) corrective regimen sliding scale   SubCutaneous at bedtime  insulin lispro Injectable (ADMELOG) 16 Unit(s) SubCutaneous three times a day before meals  metoprolol tartrate Injectable 5 milliGRAM(s) IV Push once  oxyCODONE    IR 5 milliGRAM(s) Oral every 8 hours PRN  piperacillin/tazobactam IVPB.. 3.375 Gram(s) IV Intermittent every 8 hours  senna 2 Tablet(s) Oral at bedtime  sodium chloride 0.9% lock flush 10 milliLiter(s) IV Push every 1 hour PRN    FHDiabetes mellitus, type 2 (Mother)    ,   PMHSeasonal allergies    Obesity (BMI 30.0-34.9)    Hydrocele, bilateral    HLD (hyperlipidemia)    Hypertension    Diabetes mellitus, type 2    PVD (peripheral vascular disease)    GERD (gastroesophageal reflux disease)    Vitamin D deficiency       PSHNo significant past surgical history        Labs              Vital Signs Last 24 Hrs  T(C): 36.9 (03 Oct 2022 05:30), Max: 37.1 (02 Oct 2022 20:49)  T(F): 98.4 (03 Oct 2022 05:30), Max: 98.7 (02 Oct 2022 20:49)  HR: 97 (03 Oct 2022 05:30) (82 - 100)  BP: 161/91 (03 Oct 2022 05:30) (137/86 - 165/105)  BP(mean): --  RR: 19 (03 Oct 2022 05:30) (18 - 19)  SpO2: 98% (03 Oct 2022 05:30) (98% - 98%)    Parameters below as of 03 Oct 2022 05:30  Patient On (Oxygen Delivery Method): room air      Sedimentation Rate, Erythrocyte: 86 mm/Hr (09-27-22 @ 07:59)  Sedimentation Rate, Erythrocyte: 96 mm/Hr (09-21-22 @ 14:20)         C-Reactive Protein, Serum: 13 mg/L (09-27-22 @ 07:59)  C-Reactive Protein, Serum: 88 mg/L (09-21-22 @ 14:20)         ROS unremarkable outside of HPI    PHYSICAL EXAM  LE Focused:  Pt is A&Ox3 in no acute distress  Left foot focused  Vasc: DP/PT pulses palpable ; CFT < 3 seconds to hallux on left; improved erythema and improved swelling to the left foot. TG: warm to cool. Pedal hair absent. Varicosities absent.   Derm: Left 1st interspace improved maceration noted with small wound that tunnels to plantar wound measuring .2 x .2cm. Left plantar foot wound sub-2nd and 3rd metatarsal heads measuring 4.7cm x 3.1cm x .5cm extending plantar with improved macerated edges and granular/adipose wound bed. No purulent drainage noted today; scant amount of serous drainage from interspace wound with healthy bleeding noted. Decreased plantar wound tunneling laterally.  Neuro: Protective sensation absent b/l; light touch sensation diminished b/l   MSK: No POP to left foot wounds       < from: Xray Foot AP + Lateral + Oblique, Left (09.21.22 @ 15:35) >  ACC: [id number] EXAM:  XR FOOT COMP MIN 3 VIEWS LT                          PROCEDURE DATE:  09/21/2022          INTERPRETATION:  LEFT foot    CLINICAL INFORMATION: Infection..    TECHNIQUE: AP,lateral and oblique views.    FINDINGS:  A plantar ulceration with soft tissue swelling of the ball of foot.    The bones and joint spaces are intact.  No fracture or dislocation.  No radiographic evidence of osteomyelitis.    IMPRESSION:    Plantar soft tissue ulceration with soft tissue swelling.  No radiographic evidence of osteomyelitis.  If osteomyelitis is considered  despite conservative therapy, and soft   tissue / bone infection requires further assessment, follow-up MRI   recommended.    --- End of Report ---    < end of copied text >        CULTURES:

## 2022-10-03 NOTE — PROGRESS NOTE ADULT - ASSESSMENT
41 year old male with past medical history of DM (last A1c >10), HTN, HLD who p/w left foot wound. Of note was admitted for 7 days at [facility] in which he received Unasyn and then was later discharged with a PICC line for additional 2-3 weeks of IV Unasyn, but wound started to have purulent discharge andreported fevers and chills.  Admitted to medicine for Left diabetic foot ulcer,  MRI confirmed osteomyelitis. RD Stratton followed started on IV Zosyn.   Vascular followed and found No acute vascular intervention required at this time.   s/p left foot wound debridement on 09/26 with podiatry, recommended to continue IV Zosyn x6 weeks + wound vacc. PICC line placed 9/29, pending home care services NCM to follow   10/3-Pt. is medically stable for discharge to home with home care for infusion and wound care.  CM following, awaiting insurance clearance.  Pt. is upset about delayed discharge despite repeated explanations by CM and NP, now refusing Abx while here. 41 year old male with past medical history of DM (last A1c >10), HTN, HLD who p/w left foot wound. Of note was admitted for 7 days at [facility] in which he received Unasyn and then was later discharged with a PICC line for additional 2-3 weeks of IV Unasyn, but wound started to have purulent discharge andreported fevers and chills.  Admitted to medicine for Left diabetic foot ulcer,  MRI confirmed osteomyelitis. ID Viral followed started on IV Zosyn.   Vascular followed and found No acute vascular intervention required at this time.   s/p left foot wound debridement on 09/26 with podiatry, recommended to continue IV Zosyn x6 weeks + wound vacc. PICC line placed 9/29, pending home care services [facility] to follow   10/3-Pt. is medically stable for discharge to home with home care for infusion and wound care.  CM following, awaiting insurance clearance.  Pt. is upset about delayed discharge despite repeated explanations by CM and NP, now refusing Abx while here.  Repeated attempts made to convince pt. to accept Abx while waiting for discharge to no avail, pt. continues to decline.

## 2022-10-03 NOTE — PROGRESS NOTE ADULT - PROBLEM SELECTOR PLAN 3
Uncontrolled  a1c 12.3  Lantus decreased to 46U  Admelog decreased to 14U TID   Continue sliding scale insulin coverage  Continue glucose monitoring  Continue low carb diet   Dr. Marr, Endocrine, follows Uncontrolled  a1c 12.3  Lantus and Admelog dose adjustments by Dr. Marr  Continue sliding scale insulin coverage  Continue glucose monitoring  Continue low carb diet   Discharge anti diabetic regimen ordered per Dr. Marr reccs

## 2022-10-03 NOTE — PROGRESS NOTE ADULT - TIME BILLING
reviewing records/charts/labs, 25% spent face to face in counselling and discussing the diabetes plan of care in addition to discharge plan. reviewing records/charts/labs, 25% spent face to face in counselling and discussing the diabetes plan of care in addition to discharge plan. Discussed the risk and side effects of metformin medication

## 2022-10-03 NOTE — PROGRESS NOTE ADULT - PROBLEM SELECTOR PLAN 1
confirmed on MRI   s/p I&D 9/26  surgical pathology with clear margins   Podiatry it is felt the need of 6 weeks long course of abx for DFO despite 2nd MTP head bone bx negative, there are also areas of marrow edema 1st, 2nd and 3rd MTP and the bone was soft during surgery  IV Zosyn until Nov 7th 2022  NCM to follow for home services  podiatry follows  RD Stratton confirmed on MRI   s/p I&D 9/26  surgical pathology with clear margins   Podiatry felt the need of 6 weeks long course of abx for DFO despite 2nd MTP head bone bx negative, there are also areas of marrow edema 1st, 2nd and 3rd MTP and the bone was soft during surgery  IV Zosyn 3.75 gm q6hrs over 30 min until Nov 7th 2022  NCM following for home services  podiatry following  RD Stratton

## 2022-10-04 ENCOUNTER — TRANSCRIPTION ENCOUNTER (OUTPATIENT)
Age: 42
End: 2022-10-04

## 2022-10-04 LAB — SARS-COV-2 RNA SPEC QL NAA+PROBE: SIGNIFICANT CHANGE UP

## 2022-10-04 PROCEDURE — 87077 CULTURE AEROBIC IDENTIFY: CPT

## 2022-10-04 PROCEDURE — 93923 UPR/LXTR ART STDY 3+ LVLS: CPT

## 2022-10-04 PROCEDURE — 36573 INSJ PICC RS&I 5 YR+: CPT

## 2022-10-04 PROCEDURE — 88307 TISSUE EXAM BY PATHOLOGIST: CPT

## 2022-10-04 PROCEDURE — 80053 COMPREHEN METABOLIC PANEL: CPT

## 2022-10-04 PROCEDURE — 88311 DECALCIFY TISSUE: CPT

## 2022-10-04 PROCEDURE — 93005 ELECTROCARDIOGRAM TRACING: CPT

## 2022-10-04 PROCEDURE — 84100 ASSAY OF PHOSPHORUS: CPT

## 2022-10-04 PROCEDURE — 93971 EXTREMITY STUDY: CPT

## 2022-10-04 PROCEDURE — 0225U NFCT DS DNA&RNA 21 SARSCOV2: CPT

## 2022-10-04 PROCEDURE — 85610 PROTHROMBIN TIME: CPT

## 2022-10-04 PROCEDURE — 77001 FLUOROGUIDE FOR VEIN DEVICE: CPT

## 2022-10-04 PROCEDURE — 85652 RBC SED RATE AUTOMATED: CPT

## 2022-10-04 PROCEDURE — C1751: CPT

## 2022-10-04 PROCEDURE — 87186 SC STD MICRODIL/AGAR DIL: CPT

## 2022-10-04 PROCEDURE — 87040 BLOOD CULTURE FOR BACTERIA: CPT

## 2022-10-04 PROCEDURE — 83735 ASSAY OF MAGNESIUM: CPT

## 2022-10-04 PROCEDURE — 87635 SARS-COV-2 COVID-19 AMP PRB: CPT

## 2022-10-04 PROCEDURE — 86900 BLOOD TYPING SEROLOGIC ABO: CPT

## 2022-10-04 PROCEDURE — 96375 TX/PRO/DX INJ NEW DRUG ADDON: CPT

## 2022-10-04 PROCEDURE — 36415 COLL VENOUS BLD VENIPUNCTURE: CPT

## 2022-10-04 PROCEDURE — 85025 COMPLETE CBC W/AUTO DIFF WBC: CPT

## 2022-10-04 PROCEDURE — 99239 HOSP IP/OBS DSCHRG MGMT >30: CPT

## 2022-10-04 PROCEDURE — 82962 GLUCOSE BLOOD TEST: CPT

## 2022-10-04 PROCEDURE — 83036 HEMOGLOBIN GLYCOSYLATED A1C: CPT

## 2022-10-04 PROCEDURE — 86901 BLOOD TYPING SEROLOGIC RH(D): CPT

## 2022-10-04 PROCEDURE — 86140 C-REACTIVE PROTEIN: CPT

## 2022-10-04 PROCEDURE — 82043 UR ALBUMIN QUANTITATIVE: CPT

## 2022-10-04 PROCEDURE — 85730 THROMBOPLASTIN TIME PARTIAL: CPT

## 2022-10-04 PROCEDURE — 87075 CULTR BACTERIA EXCEPT BLOOD: CPT

## 2022-10-04 PROCEDURE — 87070 CULTURE OTHR SPECIMN AEROBIC: CPT

## 2022-10-04 PROCEDURE — 73630 X-RAY EXAM OF FOOT: CPT

## 2022-10-04 PROCEDURE — 80202 ASSAY OF VANCOMYCIN: CPT

## 2022-10-04 PROCEDURE — 80048 BASIC METABOLIC PNL TOTAL CA: CPT

## 2022-10-04 PROCEDURE — 86850 RBC ANTIBODY SCREEN: CPT

## 2022-10-04 PROCEDURE — 86341 ISLET CELL ANTIBODY: CPT

## 2022-10-04 PROCEDURE — 73718 MRI LOWER EXTREMITY W/O DYE: CPT

## 2022-10-04 PROCEDURE — 80061 LIPID PANEL: CPT

## 2022-10-04 PROCEDURE — 76937 US GUIDE VASCULAR ACCESS: CPT

## 2022-10-04 PROCEDURE — 90714 TD VACC NO PRESV 7 YRS+ IM: CPT

## 2022-10-04 PROCEDURE — 96374 THER/PROPH/DIAG INJ IV PUSH: CPT

## 2022-10-04 PROCEDURE — 99285 EMERGENCY DEPT VISIT HI MDM: CPT

## 2022-10-04 PROCEDURE — 85027 COMPLETE CBC AUTOMATED: CPT

## 2022-10-04 RX ORDER — PIPERACILLIN AND TAZOBACTAM 4; .5 G/20ML; G/20ML
3 INJECTION, POWDER, LYOPHILIZED, FOR SOLUTION INTRAVENOUS
Qty: 504 | Refills: 0
Start: 2022-10-04 | End: 2022-11-14

## 2022-10-04 RX ORDER — METFORMIN HYDROCHLORIDE 850 MG/1
1 TABLET ORAL
Qty: 7 | Refills: 0
Start: 2022-10-04 | End: 2022-10-10

## 2022-10-04 NOTE — DISCHARGE NOTE NURSING/CASE MANAGEMENT/SOCIAL WORK - NSDCVIVACCINE_GEN_ALL_CORE_FT
Td (adult) preservative free; 21-Sep-2022 20:04; Meaghan Ponce (ASHLEY); [place]; A132a (Exp. Date: 18-Mar-2023); IntraMuscular; Deltoid Right.; 0.5 milliLiter(s); VIS (VIS Published: 18-Mar-2023, VIS Presented: 21-Sep-2022);

## 2022-10-04 NOTE — DISCHARGE NOTE NURSING/CASE MANAGEMENT/SOCIAL WORK - NSDCPEFALRISK_GEN_ALL_CORE
For information on Fall & Injury Prevention, visit: [email or web address] OR  [email or web address] OR  https://www.cdc.gov/steadi/patient.html

## 2022-10-04 NOTE — DISCHARGE NOTE NURSING/CASE MANAGEMENT/SOCIAL WORK - PATIENT PORTAL LINK FT
You can access the FollowMyHealth Patient Portal offered by [facility] by registering at the following website: [email or web address]. By joining "[employer]’s FollowMyHealth portal, you will also be able to view your health information using other applications (apps) compatible with our system.

## 2022-10-10 ENCOUNTER — APPOINTMENT (OUTPATIENT)
Dept: PODIATRY | Facility: CLINIC | Age: 42
End: 2022-10-10

## 2022-10-10 PROCEDURE — 11043 DBRDMT MUSC&/FSCA 1ST 20/<: CPT

## 2022-10-11 RX ORDER — METFORMIN HYDROCHLORIDE 850 MG/1
1 TABLET ORAL
Qty: 60 | Refills: 0
Start: 2022-10-11 | End: 2022-11-09

## 2022-10-14 RX ORDER — CLOTRIMAZOLE AND BETAMETHASONE DIPROPIONATE 10; .5 MG/G; MG/G
1-0.05 CREAM TOPICAL
Qty: 45 | Refills: 0 | Status: DISCONTINUED | COMMUNITY
Start: 2018-02-13 | End: 2022-10-14

## 2022-10-14 NOTE — PHYSICAL EXAM
[2+] : left foot dorsalis pedis 2+ [Vibration Dec.] : diminished vibratory sensation at the level of the toes [Position Sense Dec.] : diminished position sense at the level of the toes [Diminished Throughout Right Foot] : diminished sensation with monofilament testing throughout right foot [Diminished Throughout Left Foot] : diminished sensation with monofilament testing throughout left foot [General Appearance - In No Acute Distress] : in no acute distress [Ankle Swelling (On Exam)] : not present [Varicose Veins Of Lower Extremities] : not present [] : not present [de-identified] : No gross deformities. [FreeTextEntry1] : submet head 2 [FreeTextEntry2] : 2cm [FreeTextEntry3] : 4cm [FreeTextEntry4] : 1cm [TWNoteComboBox1] : Left [de-identified] : None

## 2022-10-14 NOTE — HISTORY OF PRESENT ILLNESS
[Sneakers] : charlotte [FreeTextEntry1] : Patient presents to the office as an initial patient after discharge from [facility] for left 2nd metatarsal and 2nd toe osteomyelitis as well as plantar wound and 1st interspace abscess.  Patient was discharged on 09/26/22.  He underwent debridement, incision and drainage and a bone biopsy. MRI showed OM, bone biopsy was neg. Patient was discharged with a PICC line and Zosyn for 6 weeks.  He has an appointment with Infectious Disease doctor, Dr. Astorga, tomorrow for follow up.  He has been on a wound V.A.C. with regular dressing changes by home nursing.  He ambulates minimally in a surgical shoe on his heel.  He denies any fever, chills or redness to the area.  \par Patient is diabetic.  Finger stick today was 152.  A1c: 12.3%.  His last primary care doctor visit was 2 months ago.  He has an endocrinologist visit upcoming this week.

## 2022-10-17 ENCOUNTER — APPOINTMENT (OUTPATIENT)
Dept: PODIATRY | Facility: CLINIC | Age: 42
End: 2022-10-17

## 2022-10-17 DIAGNOSIS — M86.172 OTHER ACUTE OSTEOMYELITIS, LEFT ANKLE AND FOOT: ICD-10-CM

## 2022-10-17 PROCEDURE — 11043 DBRDMT MUSC&/FSCA 1ST 20/<: CPT | Mod: 58

## 2022-10-22 PROBLEM — M86.172 ACUTE OSTEOMYELITIS OF LEFT FOOT: Status: ACTIVE | Noted: 2022-10-22

## 2022-10-22 NOTE — ASSESSMENT
[FreeTextEntry1] : Impression: Diabetes with neuropathy (E11.42).  Diabetic foot infection, left (S91).  2nd metatarsal osteomyelitis, left.  \par \par Treatment: Discussed etiology and treatment options.  Discussed the importance of glycemic control.\par The wound was prepped with alcohol and debrided to the level of bleeding muscle with sterile nippers. Betadine dressing was applied.  Continue with home nursing, wound V.A.C. dressing and IV antibiotics.  Continue glycemic control.  Continue limited ambulation in the surgical shoe, mostly rest and elevate.  \par Return: one week. \par

## 2022-10-22 NOTE — HISTORY OF PRESENT ILLNESS
[Sneakers] : charlotte [FreeTextEntry1] : Patient presents today for follow up of left plantar forefoot wound, infection and osteomyelitis of the 2nd metatarsal and 2nd toe S/P debridement I&D on 09/26/22.  Patient is continuing with IV Zosyn, long-term IV antibiotic for management of osteomyelitis through the PICC line.  He has had wound V.A.C. applied via home nursing; however, last night it was leaking and was advised to remove it.  He has the home nurse coming into today to reapply it.  Denies any fever or chills.  He states that he ambulates minimally in the surgical shoe for essentials around the house.  He has not been working lately.  \par He has an appointment with the endocrinologist tomorrow and his finger stick was 119 and his last A1c was 12.3%.\par

## 2022-10-22 NOTE — PHYSICAL EXAM
[2+] : left foot dorsalis pedis 2+ [Vibration Dec.] : diminished vibratory sensation at the level of the toes [Position Sense Dec.] : diminished position sense at the level of the toes [Diminished Throughout Right Foot] : diminished sensation with monofilament testing throughout right foot [Diminished Throughout Left Foot] : diminished sensation with monofilament testing throughout left foot [Ankle Swelling (On Exam)] : not present [Varicose Veins Of Lower Extremities] : not present [FreeTextEntry3] : CFT: immediate.  Temperature gradient: warm to warm. [de-identified] : No gross deformities. [FreeTextEntry1] : Left submet. head 2 ulceration that measures 1.5cm x 3cm x 6mm in depth.  It probes to capsule.  There is also tracking about 3cm to the 1st interspace with 4mm x 4mcm resultant wound.  The wounds are about 50% granular and 50% fibrotic.  Trace serous drainage.   \par No malodor, no purulence, no streaking erythema, fluctuance, soft tissue crepitus or purulence appreciated.

## 2022-10-24 ENCOUNTER — APPOINTMENT (OUTPATIENT)
Dept: PODIATRY | Facility: CLINIC | Age: 42
End: 2022-10-24

## 2022-10-24 VITALS — HEIGHT: 75 IN | BODY MASS INDEX: 31.08 KG/M2 | WEIGHT: 250 LBS

## 2022-10-24 DIAGNOSIS — B35.1 TINEA UNGUIUM: ICD-10-CM

## 2022-10-24 PROCEDURE — 11721 DEBRIDE NAIL 6 OR MORE: CPT | Mod: 59

## 2022-10-24 PROCEDURE — 11043 DBRDMT MUSC&/FSCA 1ST 20/<: CPT

## 2022-10-31 ENCOUNTER — APPOINTMENT (OUTPATIENT)
Dept: PODIATRY | Facility: CLINIC | Age: 42
End: 2022-10-31

## 2022-10-31 VITALS — BODY MASS INDEX: 31.08 KG/M2 | WEIGHT: 250 LBS | HEIGHT: 75 IN

## 2022-10-31 PROCEDURE — 11043 DBRDMT MUSC&/FSCA 1ST 20/<: CPT

## 2022-10-31 RX ORDER — ATORVASTATIN CALCIUM 20 MG/1
20 TABLET, FILM COATED ORAL
Qty: 30 | Refills: 0 | Status: ACTIVE | COMMUNITY
Start: 2022-05-24

## 2022-10-31 RX ORDER — PEN NEEDLE, DIABETIC 29 G X1/2"
32G X 4 MM NEEDLE, DISPOSABLE MISCELLANEOUS
Qty: 100 | Refills: 0 | Status: ACTIVE | COMMUNITY
Start: 2022-09-24

## 2022-10-31 RX ORDER — INSULIN GLARGINE 100 [IU]/ML
100 INJECTION, SOLUTION SUBCUTANEOUS
Qty: 15 | Refills: 0 | Status: ACTIVE | COMMUNITY
Start: 2022-09-01

## 2022-10-31 RX ORDER — INSULIN LISPRO 100 U/ML
100 INJECTION, SOLUTION SUBCUTANEOUS
Qty: 15 | Refills: 0 | Status: ACTIVE | COMMUNITY
Start: 2022-10-25

## 2022-10-31 RX ORDER — GLIPIZIDE 10 MG/1
10 TABLET ORAL
Qty: 60 | Refills: 0 | Status: ACTIVE | COMMUNITY
Start: 2022-05-24

## 2022-10-31 RX ORDER — LANCETS 33 GAUGE
EACH MISCELLANEOUS
Qty: 100 | Refills: 0 | Status: ACTIVE | COMMUNITY
Start: 2022-09-09

## 2022-10-31 RX ORDER — SITAGLIPTIN AND METFORMIN HYDROCHLORIDE 100; 1000 MG/1; MG/1
100-1000 TABLET, FILM COATED, EXTENDED RELEASE ORAL
Qty: 30 | Refills: 0 | Status: ACTIVE | COMMUNITY
Start: 2022-07-08

## 2022-10-31 RX ORDER — IBUPROFEN 800 MG/1
800 TABLET, FILM COATED ORAL
Qty: 21 | Refills: 0 | Status: ACTIVE | COMMUNITY
Start: 2022-06-28

## 2022-10-31 RX ORDER — ERGOCALCIFEROL 1.25 MG/1
1.25 MG CAPSULE, LIQUID FILLED ORAL
Qty: 4 | Refills: 0 | Status: ACTIVE | COMMUNITY
Start: 2021-12-22

## 2022-10-31 RX ORDER — BLOOD SUGAR DIAGNOSTIC
STRIP MISCELLANEOUS
Qty: 50 | Refills: 0 | Status: ACTIVE | COMMUNITY
Start: 2022-10-25

## 2022-10-31 RX ORDER — ENALAPRIL MALEATE 10 MG/1
10 TABLET ORAL
Qty: 30 | Refills: 0 | Status: ACTIVE | COMMUNITY
Start: 2022-07-07

## 2022-10-31 RX ORDER — ASPIRIN 81 MG/1
81 TABLET, COATED ORAL
Qty: 90 | Refills: 0 | Status: ACTIVE | COMMUNITY
Start: 2022-05-06

## 2022-10-31 RX ORDER — BLOOD-GLUCOSE METER
W/DEVICE EACH MISCELLANEOUS
Qty: 1 | Refills: 0 | Status: ACTIVE | COMMUNITY
Start: 2022-09-09

## 2022-10-31 RX ORDER — METFORMIN ER 500 MG 500 MG/1
500 TABLET ORAL
Qty: 60 | Refills: 0 | Status: ACTIVE | COMMUNITY
Start: 2022-06-24

## 2022-10-31 RX ORDER — SEMAGLUTIDE 1.34 MG/ML
2 INJECTION, SOLUTION SUBCUTANEOUS
Qty: 2 | Refills: 0 | Status: ACTIVE | COMMUNITY
Start: 2022-10-18

## 2022-11-01 PROBLEM — B35.1 ONYCHOMYCOSIS: Status: ACTIVE | Noted: 2022-10-26

## 2022-11-01 NOTE — REASON FOR VISIT
[Follow-Up Visit] : a follow-up visit for [Foot/Ankle Ulcer] : foot/ankle ulcer [FreeTextEntry2] : left

## 2022-11-01 NOTE — PHYSICAL EXAM
[2+] : left foot dorsalis pedis 2+ [Vibration Dec.] : diminished vibratory sensation at the level of the toes [Position Sense Dec.] : diminished position sense at the level of the toes [Diminished Throughout Right Foot] : diminished sensation with monofilament testing throughout right foot [Diminished Throughout Left Foot] : diminished sensation with monofilament testing throughout left foot [Ankle Swelling (On Exam)] : not present [Varicose Veins Of Lower Extremities] : not present [FreeTextEntry3] : CFT: immediate.  Temperature gradient: warm to warm. [de-identified] : No gross deformities. [FreeTextEntry1] : Left submet. head 2 ulceration that measures 1.3cm x 3cm x 6mm in depth.  It probes to muscle.  There is also tracking about 1.5cm to the 1st interspace.  The 1st interspace wound has healed.  The wound is about 70% granular and 20% fibrotic.  No serous drainage.  No malodor, no streaking, erythema, fluctuance, crepitus, purulence appreciated.  \par Nails left 1, 2, 3 and 5, right 1 and 5 are thickened to 4mm with subungual debris, darkening, hard and dystrophic.  All the nails are elongated x 10 with discomfort on palpation, distally.  \par

## 2022-11-01 NOTE — ASSESSMENT
[FreeTextEntry1] : Impression: Diabetes with neuropathy (E11.42).  Diabetic foot infection, left (S91).  2nd metatarsal osteomyelitis, left.  Onychomycosis (B35.1).  \par \par Treatment: The left foot was prepped with 70% alcohol and under sterile conditions debrided to the level of bleeding muscle using sterile nippers and a #23 blade.  Betadine dressing was applied.  Continue wound V.A.C. via home nursing care and IV antibiotics for the osteomyelitis.  \par Continue glycemic control.  \par Continue to ambulate in a surgical shoe for essentials.\par Return: one week.  \par Nails left 1, 2, 3 and 5, right 1 and 5 were debrided of excessive thickness and length to appropriate levels of comfort and contour using sterile nippers and Dremel.   The rest of the nails were trimmed to hygienic lengths.  The procedure was tolerated well without complications.  Failure to perform this treatment based on patient's underlying condition could lead to ulceration and infection.\par

## 2022-11-01 NOTE — HISTORY OF PRESENT ILLNESS
[Post-op Sandals] : post-op sandals [Other: ___] : [unfilled] [FreeTextEntry1] : Patient presents today for management of left, plantar forefoot wound and infection with osteomyelitis, 2nd metatarsal and 2nd toe, S/P debridement and I&D on 09/26/22.  Patient is on IV Zosyn for long-term antibiotic management of osteomyelitis through the PICC line.  He has recently seen the Infectious Disease doctor.  He has been following with an endocrinologist.  Finger stick today: 189.  A1c: 12.3%.  He states that he ate takeout yesterday and that is why his finger stick is elevated.  \par He has home nursing to apply the wound V.A.C. which has been holding its seal.  They are coming back today.  Denies any fever or chills.  He states that there has been less and less drainage in the wound V.A.C. cannister.  \par

## 2022-11-07 ENCOUNTER — APPOINTMENT (OUTPATIENT)
Dept: PODIATRY | Facility: CLINIC | Age: 42
End: 2022-11-07

## 2022-11-07 NOTE — HISTORY OF PRESENT ILLNESS
[FreeTextEntry1] : Patient presents today for follow up of left plantar forefoot wound with infection/osteomyelitis of the 2nd metatarsal and 2nd toe, S/P debridement and I&D on 09/26/22.  Patient is still on IV Zosyn and he will be on it for one more week.  He has been following with the Infectious Disease doctor.  He has had home nursing come in to change his wound V.A.C. and he has mostly been staying off his foot, ambulating in surgical shoe when he does need to walk.  Finger stick: 132.  A1c: 8.9%.  He last saw his endocrinologist within the last month.  \par

## 2022-11-07 NOTE — PHYSICAL EXAM
[2+] : left foot dorsalis pedis 2+ [Vibration Dec.] : diminished vibratory sensation at the level of the toes [Position Sense Dec.] : diminished position sense at the level of the toes [Diminished Throughout Right Foot] : diminished sensation with monofilament testing throughout right foot [Diminished Throughout Left Foot] : diminished sensation with monofilament testing throughout left foot [Ankle Swelling (On Exam)] : not present [Varicose Veins Of Lower Extremities] : not present [FreeTextEntry3] : CFT: immediate.  Temperature gradient: warm to warm. [de-identified] : No gross deformities. [FreeTextEntry1] : Left submet. head 2 ulceration that measures 1.3cm x 2.5cm x 0.6cm in depth.  It probes to muscle.  There is also tracking about 1.3cm to the 1st interspace.  The 1st interspace wound has healed.  The wound base is about 80% granular and 20% fibrotic.  No serous drainage.  No malodor, no streaking, erythema, fluctuance, crepitus, purulence appreciated.  \par Nails left 1, 2, 3 and 5, right 1 and 5 are thickened to 4mm with subungual debris, darkening, hard and dystrophic.  Nails are not elongated as they have been recently trimmed.

## 2022-11-07 NOTE — ASSESSMENT
[FreeTextEntry1] : Impression: Diabetes with neuropathy (E11.42).  Diabetic foot infection, left (S91).  2nd metatarsal osteomyelitis, left.  Onychomycosis (B35.1).  \par \par Treatment: The left foot was wiped with 70% alcohol and under sterile conditions, debrided to the level of bleeding healthy tissues using sterile nippers and a #23 blade.  Betadine dressing was applied.  Continue wound V.A.C. via home nursing care and IV antibiotics for the osteomyelitis.  \par Continue glycemic control.  \par Continue to ambulate in a surgical shoe for essentials.\par Return: one week.  \par

## 2022-11-09 ENCOUNTER — APPOINTMENT (OUTPATIENT)
Dept: PODIATRY | Facility: CLINIC | Age: 42
End: 2022-11-09

## 2022-11-09 PROCEDURE — 11043 DBRDMT MUSC&/FSCA 1ST 20/<: CPT

## 2022-11-15 RX ORDER — INSULIN HUMAN 4-8-12(60)
60X4 &60X8 & KIT INHALATION
Qty: 180 | Refills: 0 | Status: ACTIVE | COMMUNITY
Start: 2022-11-01

## 2022-11-15 RX ORDER — SEMAGLUTIDE 1.34 MG/ML
4 INJECTION, SOLUTION SUBCUTANEOUS
Qty: 3 | Refills: 0 | Status: ACTIVE | COMMUNITY
Start: 2022-11-10

## 2022-11-15 NOTE — ASSESSMENT
[FreeTextEntry1] : Impression: Diabetes with neuropathy (E11.42).  Diabetic foot infection, left (S91).  2nd metatarsal osteomyelitis, left.  Onychomycosis (B35.1).  \par \par Treatment: The left foot was wiped with 70% alcohol and under sterile conditions, debrided to the level of bleeding healthy tissues using sterile nippers and a #23 blade with resection of all hyperkeratotic/fibrotic tissue.  Not clinically infected.  Betadine dressing was applied.  Continue wound V.A.C. via home nursing care.\par Continue glycemic control.  Will continue to monitor for any recurrence of infection.  \par Continue to ambulate in a surgical shoe for essentials.\par Do not recommend patient return to work at this time as he needs to be mostly off-weight bearing on that foot.  \par Return: one week.  \par

## 2022-11-15 NOTE — HISTORY OF PRESENT ILLNESS
[Post-op Sandals] : post-op sandals [FreeTextEntry1] : Patient presents today for left foot wound and osteomyelitis.\par Finger stick: 114.  A1c: 8.9%.  Last PCP appointment was on Friday.  \par Patient has finished IV Zosyn through the PICC line for the osteomyelitis.  He reports no worsening of symptoms.  He reports that the drainage has been improving and he has had home nursing change the V.A.C. dressing 3x/week.  \par

## 2022-11-15 NOTE — PHYSICAL EXAM
[2+] : left foot dorsalis pedis 2+ [Vibration Dec.] : diminished vibratory sensation at the level of the toes [Position Sense Dec.] : diminished position sense at the level of the toes [Diminished Throughout Right Foot] : diminished sensation with monofilament testing throughout right foot [Diminished Throughout Left Foot] : diminished sensation with monofilament testing throughout left foot [Ankle Swelling (On Exam)] : not present [Varicose Veins Of Lower Extremities] : not present [FreeTextEntry3] : CFT: immediate.  Temperature gradient: warm to warm. [de-identified] : No gross deformities. [FreeTextEntry1] : Left submet. head 2 ulceration that measures 1.3cm x 2.5cm x 0.6cm in depth.  It probes to muscle.  There is also tracking about 1.3cm to the 1st interspace.  The 1st interspace wound has healed.  The wound base is about 80% granular and 20% fibrotic.  No serous drainage.  No malodor, no streaking, erythema, fluctuance, crepitus, purulence appreciated.  \par Nails left 1, 2, 3 and 5, right 1 and 5 are thickened to 4mm with subungual debris, darkening, hard and dystrophic.  Nails are not elongated as they have been recently trimmed.

## 2022-11-16 ENCOUNTER — APPOINTMENT (OUTPATIENT)
Dept: PODIATRY | Facility: CLINIC | Age: 42
End: 2022-11-16

## 2022-11-16 PROCEDURE — 11043 DBRDMT MUSC&/FSCA 1ST 20/<: CPT

## 2022-11-16 PROCEDURE — 97605 NEG PRS WND THER DME<=50SQCM: CPT | Mod: 59

## 2022-11-25 ENCOUNTER — APPOINTMENT (OUTPATIENT)
Dept: PODIATRY | Facility: CLINIC | Age: 42
End: 2022-11-25

## 2022-11-28 ENCOUNTER — APPOINTMENT (OUTPATIENT)
Dept: PODIATRY | Facility: CLINIC | Age: 42
End: 2022-11-28

## 2022-11-28 VITALS — WEIGHT: 251 LBS | BODY MASS INDEX: 31.21 KG/M2 | HEIGHT: 75 IN

## 2022-11-28 DIAGNOSIS — T14.8XXA OTHER INJURY OF UNSPECIFIED BODY REGION, INITIAL ENCOUNTER: ICD-10-CM

## 2022-11-28 PROCEDURE — 11042 DBRDMT SUBQ TIS 1ST 20SQCM/<: CPT

## 2022-11-28 PROCEDURE — 97605 NEG PRS WND THER DME<=50SQCM: CPT | Mod: 59

## 2022-11-28 NOTE — HISTORY OF PRESENT ILLNESS
[FreeTextEntry1] : Patient returns for management of left submet. head 2 wound and osteomyelitis.  Patient is finished with IV antibiotics.  He has wound care nursing come to change V.A.C. dressing 3x/week.  His last A1c was 8.9%.  \par \par

## 2022-11-28 NOTE — PHYSICAL EXAM
[2+] : left foot dorsalis pedis 2+ [Vibration Dec.] : diminished vibratory sensation at the level of the toes [Position Sense Dec.] : diminished position sense at the level of the toes [Diminished Throughout Right Foot] : diminished sensation with monofilament testing throughout right foot [Diminished Throughout Left Foot] : diminished sensation with monofilament testing throughout left foot [Ankle Swelling (On Exam)] : not present [Varicose Veins Of Lower Extremities] : not present [FreeTextEntry3] : CFT: immediate.  Temperature gradient: warm to warm. [de-identified] : No gross deformities. [FreeTextEntry1] : Left submet. head 2 ulceration that measures 1.3cm x 2.5cm x 0.6cm in depth.  It probes to muscle.  There is also tracking about 1.3cm to the 1st interspace.  The 1st interspace wound has healed.  The wound base is about 80% granular and 20% fibrotic.  No serous drainage.  No malodor, no streaking, erythema, fluctuance, crepitus, purulence appreciated.  \par There are hyperkeratotic macerated wound edges are present.  \par

## 2022-11-28 NOTE — ASSESSMENT
[FreeTextEntry1] : Impression: Diabetes with neuropathy (E11.42).  Diabetic foot infection, left (S91).  2nd metatarsal osteomyelitis, left.  \par \par Treatment: The left foot was wiped with 70% alcohol and under sterile conditions, debrided to the level of bleeding healthy tissues using sterile nippers and a #23 blade with resection of all hyperkeratotic/fibrotic tissue.  Not clinically infected.  The wound edges were painted with Betadine to decrease the macerations.  The wound was pre-lined with Tegaderm as well from underneath the foam bridging.  The wound V.A.C. was applied with a tight seal 125mmHg continuous.  Continue wound V.A.C. via home nursing care.  Patient was given a script for the wound care nurse to apply the wound V.A.C. in a similar manner with lining of the wound edges with Tegaderm to prevent maceration.  \par Continue glycemic control.  Will continue to monitor for any recurrence of infection.  \par Continue to ambulate in a surgical shoe for essentials. \par Continue ambulation in a surgical shoe.  \par Return: one week. \par

## 2022-12-01 PROBLEM — T14.8XXA OPEN WOUND: Status: ACTIVE | Noted: 2022-10-11

## 2022-12-05 ENCOUNTER — APPOINTMENT (OUTPATIENT)
Dept: PODIATRY | Facility: CLINIC | Age: 42
End: 2022-12-05

## 2022-12-05 VITALS — HEIGHT: 75 IN | BODY MASS INDEX: 31.21 KG/M2 | WEIGHT: 251 LBS

## 2022-12-05 PROCEDURE — 11042 DBRDMT SUBQ TIS 1ST 20SQCM/<: CPT

## 2022-12-05 NOTE — HISTORY OF PRESENT ILLNESS
[Post-op Sandals] : post-op sandals [FreeTextEntry1] : Patient returns for management of left submet. head 2 wound and osteomyelitis that has been treated with IV antibiotics. Patient usually has home nursing for wound VAC changes however last week they only came once. Patient is not sure why they didn't come on Saturday. He had to remove the wound VAC by himself as there has been a leak. He applied dry gauze dressing to it. Last A1c is 8.9. Finger stick today was 124. \par

## 2022-12-05 NOTE — PHYSICAL EXAM
[2+] : left foot dorsalis pedis 2+ [Vibration Dec.] : diminished vibratory sensation at the level of the toes [Diminished Throughout Right Foot] : diminished sensation with monofilament testing throughout right foot [Diminished Throughout Left Foot] : diminished sensation with monofilament testing throughout left foot [Ankle Swelling (On Exam)] : not present [Varicose Veins Of Lower Extremities] : not present [FreeTextEntry3] : CFT: immediate. Temperature gradient: warm to warm. [de-identified] : Left 2nd toe semi-rigid hammertoe. [FreeTextEntry1] : Left submet. head 2 ulceration that measures 1.3cm x 2.1cm x 0.6cm in depth. It probes to muscle and tracks about .6cm. The 1st interspace wound has healed. The wound base is about 80% granular and 20% fibrotic. No serous drainage. No malodor, no streaking, erythema, fluctuance, crepitus, purulence appreciated. Hypergranular tissue is present.

## 2022-12-05 NOTE — ASSESSMENT
[FreeTextEntry1] : \par Impression: Diabetic foot ulcer left side. 2nd metatarsal osteomyelitis, left, chronic. Diabetic neuropathy.\par \par Treatment:  Left foot wound was prepped with with 70% alcohol and all nonviable tissue including hyperkeratotic wound edges, fibrotic tissue and hypergranular tissue were resected using sterile nippers and a #23 blade to the level of healthy, bleeding tissue subcutaneously. The wound was pre-lined with Tegaderm as well as underneath the foam bridging. Wound VAC was applied with a tight seal at 125 mmHg continuous. Continue wound VAC via home nursing. I encouraged the patient to call the home nursing company if they continue to not show up for wound dressing changes. Continue glycemic control and monitor for any signs of infection. Continue to ambulate in surgical shoe for essentials. I will see him back in one week.\par \par

## 2022-12-13 NOTE — HISTORY OF PRESENT ILLNESS
[Post-op Sandals] : post-op sandals [FreeTextEntry1] : Patient presents today for management of left submet. head 2 wound and osteomyelitis that has been treated with IV antibiotics.  Patient states that last week the home nursing came to change the dressing but he did not have a change since last Wednesday.  He took off the dressing himself yesterday because it was leaking.  Finger stick today: 134.  A1c: 9%.  He last saw Abhijeet Johnson 1 week ago.  Denies any fever or chills.  He reports no drainage from the wound in the V.A.C. cannister.  He has been ambulating in the surgical shoe as instructed.\par

## 2022-12-13 NOTE — PHYSICAL EXAM
[2+] : left foot dorsalis pedis 2+ [Vibration Dec.] : diminished vibratory sensation at the level of the toes [Position Sense Dec.] : diminished position sense at the level of the toes [Diminished Throughout Right Foot] : diminished sensation with monofilament testing throughout right foot [Diminished Throughout Left Foot] : diminished sensation with monofilament testing throughout left foot [Ankle Swelling (On Exam)] : not present [Varicose Veins Of Lower Extremities] : not present [FreeTextEntry3] : CFT: immediate.  Temperature gradient: warm to warm. [de-identified] : No gross deformities. [FreeTextEntry1] : Left submet. head 2 ulceration that measures 1cm x 1.5cm x 0.6cm in depth.  It probes to subcutaneous tissue and does not track.  The 1st interspace wound has healed.  The wound base is about 80% granular and 20% fibrotic.  No serous drainage.  No malodor, no streaking, erythema, fluctuance, crepitus, purulence appreciated.  \par There are hyperkeratotic macerated wound edges are present.  \par

## 2022-12-13 NOTE — ASSESSMENT
[FreeTextEntry1] : Impression: Diabetes with neuropathy (E11.42).  Diabetic foot infection, left (S91).  2nd metatarsal osteomyelitis, left.  \par \par Treatment: The left foot was wiped with 70% alcohol and under sterile conditions, debrided to the level of bleeding healthy tissues using sterile nippers and a #23 blade with resection of all hyperkeratotic/fibrotic tissue.  Not clinically infected.  \par Patient did not have any more foam V.A.C. dressings with him today.  I dressed him with a Betadine clean dressing.  I advised him to continue the V.A.C. for now; however, it will likely be discontinued soon as the wound improves.  Continue with home nursing care.  Continue glycemic control.  Monitor for any signs of infection.  Continue to ambulate in surgical shoe for essentials.  \par Return: one week. \par

## 2022-12-16 ENCOUNTER — APPOINTMENT (OUTPATIENT)
Dept: PODIATRY | Facility: CLINIC | Age: 42
End: 2022-12-16
Payer: MEDICAID

## 2022-12-16 PROCEDURE — 97597 DBRDMT OPN WND 1ST 20 CM/<: CPT

## 2022-12-23 ENCOUNTER — APPOINTMENT (OUTPATIENT)
Dept: PODIATRY | Facility: CLINIC | Age: 42
End: 2022-12-23

## 2022-12-27 NOTE — PHYSICAL EXAM
[2+] : left foot dorsalis pedis 2+ [Vibration Dec.] : diminished vibratory sensation at the level of the toes [Position Sense Dec.] : diminished position sense at the level of the toes [Diminished Throughout Right Foot] : diminished sensation with monofilament testing throughout right foot [Diminished Throughout Left Foot] : diminished sensation with monofilament testing throughout left foot [Ankle Swelling (On Exam)] : not present [Varicose Veins Of Lower Extremities] : not present [FreeTextEntry3] : CFT: immediate.  Temperature gradient: warm to warm. [de-identified] : No gross deformities. [FreeTextEntry1] : Left submet. head 2 ulceration that measures 0.7cm x 1.3cm x 0.3cm in depth.  It probes to subcutaneous tissue and does not track.  The 1st interspace wound has healed.  The wound base is about 90% granular and 10% fibrotic.  No serous drainage.  No malodor, no streaking, erythema, fluctuance, crepitus, purulence appreciated.  \par There are hyperkeratotic macerated wound edges are present. \par Hyperkeratotic wound edges are present but not macerated.   \par

## 2022-12-27 NOTE — HISTORY OF PRESENT ILLNESS
[Post-op Sandals] : post-op sandals [FreeTextEntry1] : Patient presents today for management of left submet. head 2 wound with history of osteomyelitis, treated with IV antibiotics.  Per patient's home nurse, the insurance has discontinued the home V.A.C. and he has sent it back.  He has been getting dry, clean dressings applied and he has been walking in the surgical shoe.  He denies any drainage, fever or chills.  He states that he has been walking minimally around the house and mostly staying off his foot. \par Finger stick: 119.  A1c: 8.2%.  He last saw Dr. Jha on 11/30/22.\par

## 2022-12-27 NOTE — ASSESSMENT
[FreeTextEntry1] : Impression: Diabetes with neuropathy (E11.42).  Diabetic foot infection, left (S91).  2nd metatarsal osteomyelitis, left.  \par \par Treatment: The left foot was wiped with 70% alcohol and under sterile conditions, debrided to the level of bleeding healthy tissues using sterile nippers and a #23 blade with resection of all hyperkeratotic/fibrotic tissue.  Not clinically infected. Clean dressing was applied.  \par  I gave the patient off-loading pads to use around the 2nd metatarsal head and I instructed him on how to apply it.  Home nursing can apply Aquacel to the wound.  \par I urged the patient to continue only minimal walking, wear the surgical shoe for protection.  Continue glycemic control. \par I gave the patient a script for diabetic shoes and will modify them in the future to incorporate the off-loading pad.  \par Return: one week. \par

## 2023-01-12 ENCOUNTER — APPOINTMENT (OUTPATIENT)
Dept: PODIATRY | Facility: CLINIC | Age: 43
End: 2023-01-12
Payer: MEDICAID

## 2023-01-12 DIAGNOSIS — M86.60 OTHER CHRONIC OSTEOMYELITIS, UNSPECIFIED SITE: ICD-10-CM

## 2023-01-12 PROCEDURE — 97597 DBRDMT OPN WND 1ST 20 CM/<: CPT

## 2023-01-19 PROBLEM — M86.60 OSTEOMYELITIS, CHRONIC: Status: ACTIVE | Noted: 2022-12-01

## 2023-01-24 NOTE — PHYSICAL EXAM
[2+] : left foot dorsalis pedis 2+ [Vibration Dec.] : diminished vibratory sensation at the level of the toes [Position Sense Dec.] : diminished position sense at the level of the toes [Diminished Throughout Right Foot] : diminished sensation with monofilament testing throughout right foot [Diminished Throughout Left Foot] : diminished sensation with monofilament testing throughout left foot [Ankle Swelling (On Exam)] : not present [Varicose Veins Of Lower Extremities] : not present [FreeTextEntry3] : CFT: immediate. Temperature gradient: warm to warm.  [de-identified] : No gross deformities.  [FreeTextEntry1] : Left submet. head 2 ulceration that measures 0.7cm x 1 cm x 0.3cm in depth. It probes to subcutaneous tissue and does not track. The 1st interspace wound has healed. The wound base is about 90% granular and 10% fibrotic. No serous drainage. No malodor, no streaking, erythema, fluctuance, crepitus, purulence appreciated. \par There are hyperkeratotic wound edges present. \par Hyperkeratotic wound edges are present but not macerated. \par

## 2023-01-24 NOTE — HISTORY OF PRESENT ILLNESS
[Sneakers] : [place] [FreeTextEntry1] : Patient returns today for management of left diabetic submet. 2 plantar foot wound. The patient has been using Aquacel. he does not have any home nursing anymore. He states he has been changing the dressing daily. Denies any drainage, purulence or malodor from the area. Denies any redness. He had been ambulating in a surgical shoe and using off-loading pads provided to him on last visit. He did not obtain diabetic shoes yet. Finger stick today is 135. A1c is 8.2. He last saw Dr. Jha one month ago. He is not currently on any antibiotics.\par \par

## 2023-01-24 NOTE — ASSESSMENT
[FreeTextEntry1] : \par Impression: Diabetes with neuropathy. Diabetic foot ulcer, left.  2nd metatarsal osteomyelitis, left. \par \par Treatment: The left foot was wiped with 70% alcohol and under sterile conditions, debrided to the level of bleeding healthy tissues using sterile nippers and a #23 blade with resection of all hyperkeratotic/fibrotic tissue. Not clinically infected. Clean dressing was applied.  \par Continue with glycemic control and diabetic foot care. \par Continue to ambulate in the surgical shoe.\par  I strongly urged patient to obtain diabetic shoes so the off loading pad could be added into them. Continue to use Aquacel or Betadine at home with daily dressing changes, i gave patient offloading pads. Monitor for any signs of infection. Return if they occur. I will see the patient back in 2 weeks.

## 2023-01-26 ENCOUNTER — APPOINTMENT (OUTPATIENT)
Dept: PODIATRY | Facility: CLINIC | Age: 43
End: 2023-01-26
Payer: MEDICAID

## 2023-01-26 DIAGNOSIS — Z87.39 PERSONAL HISTORY OF OTHER DISEASES OF THE MUSCULOSKELETAL SYSTEM AND CONNECTIVE TISSUE: ICD-10-CM

## 2023-01-26 DIAGNOSIS — L97.505: ICD-10-CM

## 2023-01-26 PROCEDURE — 97597 DBRDMT OPN WND 1ST 20 CM/<: CPT | Mod: 59

## 2023-01-26 PROCEDURE — 11055 PARING/CUTG B9 HYPRKER LES 1: CPT

## 2023-01-26 PROCEDURE — 11721 DEBRIDE NAIL 6 OR MORE: CPT | Mod: 59

## 2023-02-06 PROBLEM — L97.505: Status: ACTIVE | Noted: 2022-10-18

## 2023-02-13 ENCOUNTER — APPOINTMENT (OUTPATIENT)
Dept: PODIATRY | Facility: CLINIC | Age: 43
End: 2023-02-13

## 2023-02-13 PROBLEM — Z87.39 HISTORY OF OSTEOMYELITIS: Status: ACTIVE | Noted: 2023-02-06

## 2023-02-13 NOTE — PHYSICAL EXAM
[2+] : left foot dorsalis pedis 2+ [Vibration Dec.] : diminished vibratory sensation at the level of the toes [Position Sense Dec.] : diminished position sense at the level of the toes [Diminished Throughout Right Foot] : diminished sensation with monofilament testing throughout right foot [Diminished Throughout Left Foot] : diminished sensation with monofilament testing throughout left foot [Ankle Swelling (On Exam)] : not present [Varicose Veins Of Lower Extremities] : not present [FreeTextEntry3] : CFT: immediate.  Temperature gradient: warm to warm. [de-identified] : Left 2nd digit, semi-flexible hammertoe. [FreeTextEntry1] : Bilateral nails 1 through 5 are thickened up to 4mm, elongated, hard with subungual debris and mild darkening.  \par Right lateral foot around the 5th metatarsal head area has a dried superficial blister with completely epithelialized base with peeling skin and hyperkeratosis. \par Left submet. head 2 ulceration covered by hyperkeratotic skin with thick hyperkeratotic skin borders.  Upon debridement, the ulceration measures 0.3cm x 0.2cm x 0.1cm in depth.  Does not probe deep or to bone.  No drainage, no malodor, no streaking erythema or fluctuance or tissue crepitus or purulence.  No necrosis.  Wound base is 90% granular, 10% fibrotic.

## 2023-02-13 NOTE — HISTORY OF PRESENT ILLNESS
[Flip Flops] : flip flops [FreeTextEntry1] : Patient is here for management of submet. 2 diabetic foot wound, history of osteomyelitis.  He states that he has been using antibiotic cream or Betadine on it with a Band-Aid.  He has been using the off-loading pads, which were given to him at his last appointment.  He denies any drainage, malodor or redness from the wound.  He has been ambulating in the surgical shoe.  He did not obtain the diabetic shoes yet; he states that he will go today.  \par He has also noticed right lateral foot blister, which has dried up with resultant dry skin that he has tried to peel off on his own.  He is not sure if he has worn a tight pair of shoes and this issue occurred about one week ago.\par Fasting blood sugar: 106.  A1c: 8.2%.  He follows with Dr. Jha who he last saw one month ago.

## 2023-02-13 NOTE — ASSESSMENT
[FreeTextEntry1] : Impression: Diabetes with neuropathy (E11.42).  Diabetic foot infection, left (S91).  2nd metatarsal osteomyelitis, left.  Onychomycosis (B35.1).  Callus, right (L85.1).  \par \par Treatment: The left foot was wiped with 70% alcohol and under sterile conditions, debrided to the level of bleeding healthy tissues using sterile nippers and a #23 blade with resection of all hyperkeratotic/fibrotic tissue.  Not clinically infected.  Silvadene dressing was applied and off-loading pad was applied as well.  I gave patient extra off-loading pads to use at home.  Advised him to change the dressing daily.  Monitor for any recurrence of infection.  If infection occurs, patient is to return to the office immediately.  \par The right foot hyperkeratosis was wipes with alcohol and trimmed with a sterile #23 blade.  \par Advised patient to be mindful of is shoe gear, not to wear shoes that are too tight on him.  I strongly advised patient to obtain a pair of diabetic shoes, as they will be properly fitted.  \par Nails 1 through 5, bilateral were debrided of excessive thickness and length to appropriate levels of comfort and contour using sterile nippers and Dremel.   The procedure was tolerated well without complications.\par Failure to perform this treatment based on patient's underlying condition could lead to ulceration and infection.\par Return: 2 weeks.  \par Continue glycemic control and diabetic foot care.  \par

## 2023-03-06 ENCOUNTER — APPOINTMENT (OUTPATIENT)
Dept: PODIATRY | Facility: CLINIC | Age: 43
End: 2023-03-06
Payer: MEDICAID

## 2023-03-06 DIAGNOSIS — E11.621 TYPE 2 DIABETES MELLITUS WITH FOOT ULCER: ICD-10-CM

## 2023-03-06 DIAGNOSIS — L97.529 TYPE 2 DIABETES MELLITUS WITH FOOT ULCER: ICD-10-CM

## 2023-03-06 PROCEDURE — 97597 DBRDMT OPN WND 1ST 20 CM/<: CPT

## 2023-03-07 PROBLEM — E11.621 DIABETIC ULCER OF LEFT FOOT: Status: ACTIVE | Noted: 2023-01-19

## 2023-03-10 NOTE — ASSESSMENT
[FreeTextEntry1] : Impression: Diabetes with neuropathy (E11.42).  Diabetic foot infection, left (S91).  2nd metatarsal osteomyelitis, left. \par \par Treatment: The left foot was wiped with 70% alcohol and under sterile conditions, debrided to the level of bleeding healthy tissues using sterile nippers and a #23 blade with resection of all hyperkeratotic/fibrotic tissue.  Not clinically infected.  Silvadene dressing was applied and off-loading pad was applied as well.  \par I gave patient extra off-loading pads to use at home.  Advised him to change the dressing daily.  Monitor for any recurrence of infection.  If infection occurs, patient is to return to the office immediately.  I showed the patient where he can find off-loading pads since he cannot afford diabetic shoes right now and he needs to adequately off-load the area. I advised patient to wear wide running style sneakers.\par I also showed patient where he can buy over-the-counter Plastazote inserts that I could further modify once he comes in.  \par Continue to limit his weight bearing; however, he does need to work and he is currently working part-time.  I asked if there is an option for him to have a desk job and he states that it is not possible.  \par Continue glycemic control and diabetic foot care. \par Return: 2 weeks.

## 2023-03-10 NOTE — PHYSICAL EXAM
[2+] : left foot dorsalis pedis 2+ [Vibration Dec.] : diminished vibratory sensation at the level of the toes [Position Sense Dec.] : diminished position sense at the level of the toes [Diminished Throughout Right Foot] : diminished sensation with monofilament testing throughout right foot [Diminished Throughout Left Foot] : diminished sensation with monofilament testing throughout left foot [Ankle Swelling (On Exam)] : not present [Varicose Veins Of Lower Extremities] : not present [FreeTextEntry3] : CFT: immediate.  Temperature gradient: warm to warm. [de-identified] : Left 2nd digit, semi-flexible hammertoe. [FreeTextEntry1] : Bilateral nails 1 through 5 are thickened up to 4mm, elongated, hard with subungual debris and mild darkening.  \par Left submet. head 2 ulceration covered by hyperkeratotic skin with thick hyperkeratotic skin borders.  Upon debridement, the ulceration measures 0.4cm x 0.4cm x 0.2cm in depth.  Does not probe deep or to bone.  No drainage, no malodor, no streaking erythema or fluctuance or tissue crepitus or purulence.  No necrosis.  Wound base is 90% granular, 10% fibrotic.

## 2023-03-10 NOTE — HISTORY OF PRESENT ILLNESS
[Sneakers] : [place] [FreeTextEntry1] : Patient presents today for management of left submet. 2 diabetic foot wound with a history of osteomyelitis.  He states that he has been using antibiotic cream and the off-loading pads; however, he has run out of them.  He denies any drainage, malodor, redness from the wound.  He has been ambulating in sneakers.  He has returned to work, which is on his feet; however, he is part-time right now.  He did not obtain diabetic shoes because the insurance did not cover it.\par Finger stick last night was 136.  Last A1c was 7%.  He last saw his primary care doctor last Thursday.

## 2023-03-23 ENCOUNTER — APPOINTMENT (OUTPATIENT)
Dept: PODIATRY | Facility: CLINIC | Age: 43
End: 2023-03-23

## 2023-03-30 ENCOUNTER — APPOINTMENT (OUTPATIENT)
Dept: PODIATRY | Facility: CLINIC | Age: 43
End: 2023-03-30
Payer: MEDICAID

## 2023-03-30 DIAGNOSIS — L85.1 ACQUIRED KERATOSIS [KERATODERMA] PALMARIS ET PLANTARIS: ICD-10-CM

## 2023-03-30 DIAGNOSIS — S90.425A BLISTER (NONTHERMAL), LEFT LESSER TOE(S), INITIAL ENCOUNTER: ICD-10-CM

## 2023-03-30 PROCEDURE — 11055 PARING/CUTG B9 HYPRKER LES 1: CPT

## 2023-03-30 PROCEDURE — 99213 OFFICE O/P EST LOW 20 MIN: CPT | Mod: 25

## 2023-04-04 PROBLEM — L85.1 KERATODERMA, ACQUIRED: Status: ACTIVE | Noted: 2023-04-03

## 2023-04-04 NOTE — HISTORY OF PRESENT ILLNESS
[Post-op Sandals] : post-op sandals [FreeTextEntry1] : Patient returns today for left submet. 2 ulceration. Patient states that yesterday he had been on his feet for a long period of time and because the elevator was broken he had to use the stairs. He noticed increased swelling to his left foot. Denies any redness, fever or chills. He also noticed a blister to his left 5th toe that has leaked clear fluid. He denies any redness to the area. He switched out of his sneakers into a surgical shoe which he presents with today. He states he did buy the inserts suggested at the last visit however he doesn't have them with him.  Fingerstick patient didn't check today. Last A1c was 6.7. \par

## 2023-04-04 NOTE — ASSESSMENT
[FreeTextEntry1] : \par Impression: Diabetes with neuropathy. Left 5th toe blister. Left submet. 2 preulcerative IPK.\par \par Treatment:  Discussed etiology and treatment options of the left 5th toe blister. The area was prepped with 70% alcohol and using a sterile nipper the blister was completely de-roofed and the detached nail was removed. Area underneath appeared as normal dermis. Betadine dressing was applied.  I advised patient to ambulate in the surgical shoe and change the dressing daily with Betadine and a band-aid and monitor for any worsening or signs of infection such as redness, drainage, malodor, change in color, fever or chills, warmth. Call the office immediately if that occurs and go to the Emergency Room. Left submet. 2 IPK was shaved with a sterile #15 blade and an off-loading pad was applied. Continue to ambulate in a surgical shoe for one week. I also prescribed the patient oral Augmentin for 5 days as a precaution. Continue with glycemic control and diabetic foot care. I will see the patient back in one week.

## 2023-04-04 NOTE — PHYSICAL EXAM
[2+] : left foot dorsalis pedis 2+ [Position Sense Dec.] : diminished position sense at the level of the toes [Vibration Dec.] : diminished vibratory sensation at the level of the toes [Diminished Throughout Right Foot] : diminished sensation with monofilament testing throughout right foot [Diminished Throughout Left Foot] : diminished sensation with monofilament testing throughout left foot [Ankle Swelling (On Exam)] : not present [FreeTextEntry3] : CFT: immediate. Temperature gradient: warm to warm.  [de-identified] :  Left 2nd digit, semi-flexible hammertoe.  [FreeTextEntry1] : Bilateral nails 1 through 5 are thickened up to 4mm, hard with subungual debris and mild darkening. \par Left submet. head 2 are (area of prior ulceration) now preulcerative IPK with no clinical signs of infection.  Left 5th toe partially deflated blister on the dorsal distal aspect of the toe underneath the toenail. The blister is down to the dermis with trace clear fluid present. No redness, swelling or increased warmth, residual fluctuance, soft tissue crepitus, streaking or necrosis present. Left dorsal forefoot +2 pitting edema present.

## 2023-04-06 ENCOUNTER — APPOINTMENT (OUTPATIENT)
Dept: PODIATRY | Facility: CLINIC | Age: 43
End: 2023-04-06
Payer: MEDICAID

## 2023-04-06 DIAGNOSIS — E11.42 TYPE 2 DIABETES MELLITUS WITH DIABETIC POLYNEUROPATHY: ICD-10-CM

## 2023-04-06 DIAGNOSIS — S90.425D: ICD-10-CM

## 2023-04-06 PROCEDURE — 99212 OFFICE O/P EST SF 10 MIN: CPT

## 2023-04-07 PROBLEM — E11.42 CONTROLLED TYPE 2 DIABETES MELLITUS WITH DIABETIC POLYNEUROPATHY: Status: ACTIVE | Noted: 2022-10-11

## 2023-04-10 PROBLEM — S90.425D: Status: ACTIVE | Noted: 2023-04-07

## 2023-04-10 RX ORDER — AMOXICILLIN AND CLAVULANATE POTASSIUM 875; 125 MG/1; MG/1
875-125 TABLET, COATED ORAL
Qty: 10 | Refills: 0 | Status: COMPLETED | COMMUNITY
Start: 2023-03-30 | End: 2023-04-10

## 2023-04-10 NOTE — PHYSICAL EXAM
[2+] : left foot dorsalis pedis 2+ [Position Sense Dec.] : diminished position sense at the level of the toes [Vibration Dec.] : diminished vibratory sensation at the level of the toes [Diminished Throughout Right Foot] : diminished sensation with monofilament testing throughout right foot [Diminished Throughout Left Foot] : diminished sensation with monofilament testing throughout left foot [Ankle Swelling (On Exam)] : not present [FreeTextEntry3] : CFT: immediate. Temperature gradient: warm to warm.  [de-identified] :  Left 2nd digit, semi-flexible hammertoe.  [FreeTextEntry1] : Bilateral nails 1 through 5 are thickened up to 4mm, hard with subungual debris and mild darkening. Left submet. head 2 (area of prior ulceration) with mild hyperkeratotic skin. No open lesions. No clinical signs of infection. Left 5th digit S/P deroofing of the blister. Small skin fissure still present, superficial approximately 1cm in length with minimal depth and width. No drainage, purulence, swelling, redness, increased warmth, fluctuance, soft tissue crepitus, streaking or necrosis. 5th digit nail is absent.  Left dorsal foot edema is resolved. \par

## 2023-04-10 NOTE — HISTORY OF PRESENT ILLNESS
[FreeTextEntry1] : Patient presents for follow-up evaluation of ulcer left submet 2. He states that he finished the antibiotics. He reports no drainage, purulence, redness, fever or chills. He has been ambulating in a surgical shoe and applying antibiotic cream and a band-aid on the left 5th toe. Patient did not bring his Plastazote inserts. Today he is wearing a slide on his right foot. Finger stick today 132. Last A1c is 6.7. \par \par

## 2023-04-10 NOTE — ASSESSMENT
[FreeTextEntry1] : \par Impression: Diabetes with neuropathy. S/P left 5th digit blister.\par \par Treatment: Left 5th digit toe clinically appears significantly improved. I advised patient to continue applying antibiotic cream and a band-aid for one more week. Monitor for any signs of infection. Call the office immediately if that occurs. He is doing well, off of antibiotics. Will monitor as the nail grows out. The nail may grow out more dystrophic than prior and I strongly urged patient to bring in his Plastazote inserts to the next visit so an off-loading pad could be applied. Continue with glycemic control. I will see patient back in 2 weeks or earlier if he has any problems.

## 2023-04-20 ENCOUNTER — APPOINTMENT (OUTPATIENT)
Dept: PODIATRY | Facility: CLINIC | Age: 43
End: 2023-04-20
